# Patient Record
Sex: MALE | Race: WHITE | NOT HISPANIC OR LATINO | Employment: OTHER | URBAN - METROPOLITAN AREA
[De-identification: names, ages, dates, MRNs, and addresses within clinical notes are randomized per-mention and may not be internally consistent; named-entity substitution may affect disease eponyms.]

---

## 2017-05-19 ENCOUNTER — ALLSCRIPTS OFFICE VISIT (OUTPATIENT)
Dept: OTHER | Facility: OTHER | Age: 67
End: 2017-05-19

## 2018-01-14 VITALS
WEIGHT: 279.8 LBS | SYSTOLIC BLOOD PRESSURE: 128 MMHG | RESPIRATION RATE: 16 BRPM | HEIGHT: 74 IN | DIASTOLIC BLOOD PRESSURE: 90 MMHG | TEMPERATURE: 97.9 F | BODY MASS INDEX: 35.91 KG/M2 | HEART RATE: 80 BPM

## 2018-01-16 NOTE — RESULT NOTES
Verified Results  (Q) TSH, 3RD GENERATION 10Oct2016 09:12AM Amadeo Gomes     Test Name Result Flag Reference   TSH 3 87 mIU/L  0 40-4 50

## 2018-08-21 PROBLEM — G89.29 CHRONIC LEFT SHOULDER PAIN: Status: ACTIVE | Noted: 2017-05-19

## 2018-08-21 PROBLEM — M25.512 CHRONIC LEFT SHOULDER PAIN: Status: ACTIVE | Noted: 2017-05-19

## 2018-08-21 RX ORDER — ALPRAZOLAM 2 MG/1
1 TABLET ORAL 3 TIMES DAILY PRN
COMMUNITY
Start: 2017-03-26 | End: 2018-08-22 | Stop reason: SDUPTHER

## 2018-08-21 RX ORDER — CITALOPRAM 20 MG/1
1 TABLET ORAL DAILY
COMMUNITY
Start: 2017-04-03 | End: 2018-08-22 | Stop reason: SDUPTHER

## 2018-08-21 RX ORDER — LEVOTHYROXINE SODIUM 0.15 MG/1
1 TABLET ORAL DAILY
COMMUNITY
Start: 2014-08-22 | End: 2018-08-22 | Stop reason: SDUPTHER

## 2018-08-22 ENCOUNTER — OFFICE VISIT (OUTPATIENT)
Dept: FAMILY MEDICINE CLINIC | Facility: CLINIC | Age: 68
End: 2018-08-22
Payer: MEDICARE

## 2018-08-22 VITALS
HEIGHT: 74 IN | HEART RATE: 76 BPM | DIASTOLIC BLOOD PRESSURE: 98 MMHG | RESPIRATION RATE: 18 BRPM | WEIGHT: 283 LBS | SYSTOLIC BLOOD PRESSURE: 138 MMHG | BODY MASS INDEX: 36.32 KG/M2

## 2018-08-22 DIAGNOSIS — Z11.59 ENCOUNTER FOR HEPATITIS C SCREENING TEST FOR LOW RISK PATIENT: ICD-10-CM

## 2018-08-22 DIAGNOSIS — E03.9 ACQUIRED HYPOTHYROIDISM: ICD-10-CM

## 2018-08-22 DIAGNOSIS — Z23 NEED FOR VACCINATION WITH 13-POLYVALENT PNEUMOCOCCAL CONJUGATE VACCINE: ICD-10-CM

## 2018-08-22 DIAGNOSIS — Z00.00 MEDICARE ANNUAL WELLNESS VISIT, INITIAL: Primary | ICD-10-CM

## 2018-08-22 DIAGNOSIS — N52.9 ERECTILE DYSFUNCTION, UNSPECIFIED ERECTILE DYSFUNCTION TYPE: ICD-10-CM

## 2018-08-22 DIAGNOSIS — F41.9 ANXIETY: ICD-10-CM

## 2018-08-22 LAB — TSH SERPL DL<=0.05 MIU/L-ACNC: 0.4 UIU/ML (ref 0.36–3.74)

## 2018-08-22 PROCEDURE — 84443 ASSAY THYROID STIM HORMONE: CPT | Performed by: NURSE PRACTITIONER

## 2018-08-22 PROCEDURE — 36415 COLL VENOUS BLD VENIPUNCTURE: CPT | Performed by: NURSE PRACTITIONER

## 2018-08-22 PROCEDURE — 90670 PCV13 VACCINE IM: CPT | Performed by: NURSE PRACTITIONER

## 2018-08-22 PROCEDURE — G0438 PPPS, INITIAL VISIT: HCPCS | Performed by: NURSE PRACTITIONER

## 2018-08-22 PROCEDURE — 99214 OFFICE O/P EST MOD 30 MIN: CPT | Performed by: NURSE PRACTITIONER

## 2018-08-22 RX ORDER — SILDENAFIL 50 MG/1
50 TABLET, FILM COATED ORAL DAILY PRN
Qty: 10 TABLET | Refills: 0 | Status: SHIPPED | OUTPATIENT
Start: 2018-08-22 | End: 2019-12-09

## 2018-08-22 RX ORDER — CITALOPRAM 20 MG/1
20 TABLET ORAL DAILY
Qty: 90 TABLET | Refills: 1 | Status: SHIPPED | OUTPATIENT
Start: 2018-08-22 | End: 2018-11-16 | Stop reason: SDUPTHER

## 2018-08-22 RX ORDER — ALPRAZOLAM 2 MG/1
2 TABLET ORAL 3 TIMES DAILY PRN
Qty: 270 TABLET | Refills: 0 | Status: SHIPPED | OUTPATIENT
Start: 2018-08-22 | End: 2018-11-16 | Stop reason: SDUPTHER

## 2018-08-22 RX ORDER — LEVOTHYROXINE SODIUM 0.15 MG/1
150 TABLET ORAL DAILY
Qty: 90 TABLET | Refills: 1 | Status: SHIPPED | OUTPATIENT
Start: 2018-08-22 | End: 2018-11-16 | Stop reason: SDUPTHER

## 2018-08-22 NOTE — PROGRESS NOTES
Chief Complaint   Patient presents with   [facility] Visit     Patient present today for Annual Wellness Visit   Anxiety     needs medication refills       Assessment/Plan:    Acquired hypothyroidism  Patient currently on levothyroxine 150mcg  Stable condition  Needs labs     Anxiety  Patient having some life stress with loss of job and new interviews  Currently on celexa 20mg as well as using alprazolam BID  Diagnoses and all orders for this visit:    Medicare annual wellness visit, initial  -     PNEUMOCOCCAL CONJUGATE VACCINE 13-VALENT GREATER THAN 6 MONTHS    Need for vaccination with 13-polyvalent pneumococcal conjugate vaccine  -     PNEUMOCOCCAL CONJUGATE VACCINE 13-VALENT GREATER THAN 6 MONTHS    Encounter for hepatitis C screening test for low risk patient  -     Hepatitis C antibody; Future    Acquired hypothyroidism  -     TSH, 3rd generation with Free T4 reflex; Future  -     Comprehensive metabolic panel  -     levothyroxine 150 mcg tablet; Take 1 tablet (150 mcg total) by mouth daily  -     TSH, 3rd generation with Free T4 reflex    Anxiety  -     ALPRAZolam (XANAX) 2 MG tablet; Take 1 tablet (2 mg total) by mouth 3 (three) times a day as needed for anxiety for up to 90 days  -     citalopram (CeleXA) 20 mg tablet; Take 1 tablet (20 mg total) by mouth daily    Erectile dysfunction, unspecified erectile dysfunction type  -     sildenafil (VIAGRA) 50 MG tablet; Take 1 tablet (50 mg total) by mouth daily as needed for erectile dysfunction          Subjective:      Patient ID: Myke Deal is a 79 y o  male  Has concerns of ED  Was on a trial of medication       Anxiety   Presents for follow-up visit  Symptoms include excessive worry and nervous/anxious behavior  Patient reports no chest pain, confusion, decreased concentration, depressed mood, dizziness, insomnia, muscle tension, nausea, palpitations, shortness of breath or suicidal ideas  Symptoms occur most days   The severity of symptoms is mild  The quality of sleep is good  Nighttime awakenings: one to two  Compliance with medications is %  Thyroid Problem   Presents for follow-up visit  Symptoms include anxiety  Patient reports no constipation, depressed mood, dry skin, fatigue, leg swelling or palpitations  The symptoms have been stable  The following portions of the patient's history were reviewed and updated as appropriate: allergies, current medications, past family history, past medical history, past social history, past surgical history and problem list     Review of Systems   Constitutional: Negative for fatigue and unexpected weight change  HENT: Negative for trouble swallowing  Eyes: Negative for visual disturbance  Respiratory: Negative for chest tightness and shortness of breath  Cardiovascular: Negative for chest pain, palpitations and leg swelling  Gastrointestinal: Negative for constipation and nausea  Endocrine: Negative for polydipsia, polyphagia and polyuria  Genitourinary: Negative for difficulty urinating  Musculoskeletal: Positive for back pain  Neurological: Negative for dizziness, light-headedness and headaches  Hematological: Does not bruise/bleed easily  Psychiatric/Behavioral: Negative for confusion, decreased concentration and suicidal ideas  The patient is nervous/anxious  The patient does not have insomnia  Objective:      /98 (BP Location: Right arm, Patient Position: Sitting, Cuff Size: Standard)   Pulse 76   Resp 18   Ht 6' 1 75" (1 873 m)   Wt 128 kg (283 lb)   BMI 36 58 kg/m²          Physical Exam   Constitutional: He is oriented to person, place, and time  Vital signs are normal  He appears well-developed and well-nourished  He does not have a sickly appearance  He does not appear ill  HENT:   Head: Normocephalic and atraumatic     Right Ear: Tympanic membrane normal    Left Ear: Tympanic membrane normal    Mouth/Throat: Uvula is midline, oropharynx is clear and moist and mucous membranes are normal    Eyes: Pupils are equal, round, and reactive to light  Neck: No JVD present  Carotid bruit is not present  No thyromegaly present  Cardiovascular: Normal rate, regular rhythm, S1 normal, S2 normal and normal heart sounds  No murmur heard  Pulmonary/Chest: Effort normal and breath sounds normal    Abdominal: Soft  Normal appearance and bowel sounds are normal  He exhibits no distension  Lymphadenopathy:     He has no cervical adenopathy  Neurological: He is alert and oriented to person, place, and time  Skin: Skin is warm, dry and intact  Psychiatric: He has a normal mood and affect   Thought content normal

## 2018-08-22 NOTE — ASSESSMENT & PLAN NOTE
Patient having some life stress with loss of job and new interviews  Currently on celexa 20mg as well as using alprazolam BID

## 2018-08-22 NOTE — PATIENT INSTRUCTIONS
Obesity   AMBULATORY CARE:   Obesity  is when your body mass index (BMI) is greater than 30  Your healthcare provider will use your height and weight to measure your BMI  The risks of obesity include  many health problems, such as injuries or physical disability  You may need tests to check for the following:  · Diabetes     · High blood pressure or high cholesterol     · Heart disease     · Gallbladder or liver disease     · Cancer of the colon, breast, prostate, liver, or kidney     · Sleep apnea     · Arthritis or gout  Seek care immediately if:   · You have a severe headache, confusion, or difficulty speaking  · You have weakness on one side of your body  · You have chest pain, sweating, or shortness of breath  Contact your healthcare provider if:   · You have symptoms of gallbladder or liver disease, such as pain in your upper abdomen  · You have knee or hip pain and discomfort while walking  · You have symptoms of diabetes, such as intense hunger and thirst, and frequent urination  · You have symptoms of sleep apnea, such as snoring or daytime sleepiness  · You have questions or concerns about your condition or care  Treatment for obesity  focuses on helping you lose weight to improve your health  Even a small decrease in BMI can reduce the risk for many health problems  Your healthcare provider will help you set a weight-loss goal   · Lifestyle changes  are the first step in treating obesity  These include making healthy food choices and getting regular physical activity  Your healthcare provider may suggest a weight-loss program that involves coaching, education, and therapy  · Medicine  may help you lose weight when it is used with a healthy diet and physical activity  · Surgery  can help you lose weight if you are very obese and have other health problems  There are several types of weight-loss surgery  Ask your healthcare provider for more information    Be successful losing weight:   · Set small, realistic goals  An example of a small goal is to walk for 20 minutes 5 days a week  Anther goal is to lose 5% of your body weight  · Tell friends, family members, and coworkers about your goals  and ask for their support  Ask a friend to lose weight with you, or join a weight-loss support group  · Identify foods or triggers that may cause you to overeat , and find ways to avoid them  Remove tempting high-calorie foods from your home and workplace  Place a bowl of fresh fruit on your kitchen counter  If stress causes you to eat, then find other ways to cope with stress  · Keep a diary to track what you eat and drink  Also write down how many minutes of physical activity you do each day  Weigh yourself once a week and record it in your diary  Eating changes: You will need to eat 500 to 1,000 fewer calories each day than you currently eat to lose 1 to 2 pounds a week  The following changes will help you cut calories:  · Eat smaller portions  Use small plates, no larger than 9 inches in diameter  Fill your plate half full of fruits and vegetables  Measure your food using measuring cups until you know what a serving size looks like  · Eat 3 meals and 1 or 2 snacks each day  Plan your meals in advance  Swathi Pel and eat at home most of the time  Eat slowly  · Eat fruits and vegetables at every meal   They are low in calories and high in fiber, which makes you feel full  Do not add butter, margarine, or cream sauce to vegetables  Use herbs to season steamed vegetables  · Eat less fat and fewer fried foods  Eat more baked or grilled chicken and fish  These protein sources are lower in calories and fat than red meat  Limit fast food  Dress your salads with olive oil and vinegar instead of bottled dressing  · Limit the amount of sugar you eat  Do not drink sugary beverages  Limit alcohol  Activity changes:  Physical activity is good for your body in many ways   It helps you burn calories and build strong muscles  It decreases stress and depression, and improves your mood  It can also help you sleep better  Talk to your healthcare provider before you begin an exercise program   · Exercise for at least 30 minutes 5 days a week  Start slowly  Set aside time each day for physical activity that you enjoy and that is convenient for you  It is best to do both weight training and an activity that increases your heart rate, such as walking, bicycling, or swimming  · Find ways to be more active  Do yard work and housecleaning  Walk up the stairs instead of using elevators  Spend your leisure time going to events that require walking, such as outdoor festivals or fairs  This extra physical activity can help you lose weight and keep it off  Follow up with your healthcare provider as directed: You may need to meet with a dietitian  Write down your questions so you remember to ask them during your visits  © 2017 [address] [employer] Information is for End User's use only and may not be sold, redistributed or otherwise used for commercial purposes  All illustrations and images included in CareNotes® are the copyrighted property of [employer]  or [address]  The above information is an  only  It is not intended as medical advice for individual conditions or treatments  Talk to your doctor, nurse or pharmacist before following any medical regimen to see if it is safe and effective for you  Urinary Incontinence   WHAT YOU NEED TO KNOW:   What is urinary incontinence? Urinary incontinence (UI) is when you lose control of your bladder  What causes UI? UI occurs because your bladder cannot store or empty urine properly  The following are the most common types of UI:  · Stress incontinence  is when you leak urine due to increased bladder pressure  This may happen when you cough, sneeze, or exercise       · Urge incontinence  is when you feel the need to urinate right away and leak urine accidentally  · Mixed incontinence  is when you have both stress and urge UI  What are the signs and symptoms of UI?   · You feel like your bladder does not empty completely when you urinate  · You urinate often and need to urinate immediately  · You leak urine when you sleep, or you wake up with the urge to urinate  · You leak urine when you cough, sneeze, exercise, or laugh  How is UI diagnosed? Your healthcare provider will ask how often you leak urine and whether you have stress or urge symptoms  Tell him which medicines you take, how often you urinate, and how much liquid you drink each day  You may need any of the following tests:  · Urine tests  may show infection or kidney function  · A pelvic exam  may be done to check for blockages  A pelvic exam will also show if your bladder, uterus, or other organs have moved out of place  · An x-ray, ultrasound, or CT  may show problems with parts of your urinary system  You may be given contrast liquid to help your organs show up better in the pictures  Tell the healthcare provider if you have ever had an allergic reaction to contrast liquid  Do not enter the MRI room with anything metal  Metal can cause serious injury  Tell the healthcare provider if you have any metal in or on your body  · A bladder scan  will show how much urine is left in your bladder after you urinate  You will be asked to urinate and then healthcare providers will use a small ultrasound machine to check the urine left in your bladder  · Cystometry  is used to check the function of your urinary system  Your healthcare provider checks the pressure in your bladder while filling it with fluid  Your bladder pressure may also be tested when your bladder is full and while you urinate  How is UI treated? · Medicines  can help strengthen your bladder control      · Electrical stimulation  is used to send a small amount of electrical energy to your pelvic floor muscles  This helps control your bladder function  Electrodes may be placed outside your body or in your rectum  For women, the electrodes may be placed in the vagina  · A bulking agent  may be injected into the wall of your urethra to make it thicker  This helps keep your urethra closed and decreases urine leakage  · Devices  such as a clamp, pessary, or tampon may help stop urine leaks  Ask your healthcare provider for more information about these and other devices  · Surgery  may be needed if other treatments do not work  Several types of surgery can help improve your bladder control  Ask your healthcare provider for more information about the surgery you may need  How can I manage my symptoms? · Do pelvic muscle exercises often  Your pelvic muscles help you stop urinating  Squeeze these muscles tight for 5 seconds, then relax for 5 seconds  Gradually work up to squeezing for 10 seconds  Do 3 sets of 15 repetitions a day, or as directed  This will help strengthen your pelvic muscles and improve bladder control  · A catheter  may be used to help empty your bladder  A catheter is a tiny, plastic tube that is put into your bladder to drain your urine  Your healthcare provider may tell you to use a catheter to prevent your bladder from getting too full and leaking urine  · Keep a UI record  Write down how often you leak urine and how much you leak  Make a note of what you were doing when you leaked urine  · Train your bladder  Go to the bathroom at set times, such as every 2 hours, even if you do not feel the urge to go  You can also try to hold your urine when you feel the urge to go  For example, hold your urine for 5 minutes when you feel the urge to go  As that becomes easier, hold your urine for 10 minutes  · Drink liquids as directed  Ask your healthcare provider how much liquid to drink each day and which liquids are best for you   You may need to limit the amount of liquid you drink to help control your urine leakage  Limit or do not have drinks that contain caffeine or alcohol  Do not drink any liquid right before you go to bed  · Prevent constipation  Eat a variety of high-fiber foods  Good examples are high-fiber cereals, beans, vegetables, and whole-grain breads  Prune juice may help make your bowel movement softer  Walking is the best way to trigger your intestines to have a bowel movement  · Exercise regularly and maintain a healthy weight  Ask your healthcare provider how much you should weigh and about the best exercise plan for you  Weight loss and exercise will decrease pressure on your bladder and help you control your leakage  Ask him to help you create a weight loss plan if you are overweight  When should I seek immediate care? · You have severe pain  · You are confused or cannot think clearly  When should I contact my healthcare provider? · You have a fever  · You see blood in your urine  · You have pain when you urinate  · You have new or worse pain, even after treatment  · Your mouth feels dry or you have vision changes  · Your urine is cloudy or smells bad  · You have questions or concerns about your condition or care  CARE AGREEMENT:   You have the right to help plan your care  Learn about your health condition and how it may be treated  Discuss treatment options with your caregivers to decide what care you want to receive  You always have the right to refuse treatment  The above information is an  only  It is not intended as medical advice for individual conditions or treatments  Talk to your doctor, nurse or pharmacist before following any medical regimen to see if it is safe and effective for you  © 2017 [address] [employer] Information is for End User's use only and may not be sold, redistributed or otherwise used for commercial purposes   All illustrations and images included in CareNotes® are the copyrighted property of [employer]  or Brigido Oswald  Cigarette Smoking and Your Health   AMBULATORY CARE:   Risks to your health if you smoke:  Nicotine and other chemicals found in tobacco damage every cell in your body  Even if you are a light smoker, you have an increased risk for cancer, heart disease, and lung disease  If you are pregnant or have diabetes, smoking increases your risk for complications  Benefits to your health if you stop smoking:   · You decrease respiratory symptoms such as coughing, wheezing, and shortness of breath  · You reduce your risk for cancers of the lung, mouth, throat, kidney, bladder, pancreas, stomach, and cervix  If you already have cancer, you increase the benefits of chemotherapy  You also reduce your risk for cancer returning or a second cancer from developing  · You reduce your risk for heart disease, blood clots, heart attack, and stroke  · You reduce your risk for lung infections, and diseases such as pneumonia, asthma, chronic bronchitis, and emphysema  · Your circulation improves  More oxygen can be delivered to your body  If you have diabetes, you lower your risk for complications, such as kidney, artery, and eye diseases  You also lower your risk for nerve damage  Nerve damage can lead to amputations, poor vision, and blindness  · You improve your body's ability to heal and to fight infections  Benefits to the health of others if you stop smoking:  Tobacco is harmful to nonsmokers who breathe in your secondhand smoke  The following are ways the health of others around you may improve when you stop smoking:  · You lower the risks for lung cancer and heart disease in nonsmoking adults  · If you are pregnant, you lower the risk for miscarriage, early delivery, low birth weight, and stillbirth  You also lower your baby's risk for SIDS, obesity, developmental delay, and neurobehavioral problems, such as ADHD  · If you have children, you lower their risk for ear infections, colds, pneumonia, bronchitis, and asthma  For more information and support to stop smoking:   · Smokefree  gov  Phone: 9- 989 - 820-5631  Web Address: www smokefree  [employer]  Follow up with your healthcare provider as directed:  Write down your questions so you remember to ask them during your visits  © 2017 [address] [employer] Information is for End User's use only and may not be sold, redistributed or otherwise used for commercial purposes  All illustrations and images included in CareNotes® are the copyrighted property of [employer] M [employer]  or Brigido Oswald  The above information is an  only  It is not intended as medical advice for individual conditions or treatments  Talk to your doctor, nurse or pharmacist before following any medical regimen to see if it is safe and effective for you  Fall Prevention   WHAT YOU NEED TO KNOW:   What is fall prevention? Fall prevention includes ways to make your home and other areas safer  It also includes ways you can move more carefully to prevent a fall  What increases my risk for falls? · Lack of vitamin D    · Not getting enough sleep each night    · Trouble walking or keeping your balance, or foot problems    · Health conditions that cause changes in your blood pressure, vision, or muscle strength and coordination    · Medicines that make you dizzy, weak, or sleepy    · Problems seeing clearly    · Shoes that have high heels or are not supportive    · Tripping hazards, such as items left on the floor, no handrails on the stairs, or broken steps  How can I help protect myself from falls? · Stand or sit up slowly  This may help you keep your balance and prevent falls  If you need to get up during the night, sit up first  Be sure you are fully awake before you stand  Turn on the light before you start walking  Go slowly in case you are still sleepy   Make sure you will not trip over any pets sleeping in the bedroom  · Use assistive devices as directed  Your healthcare provider may suggest that you use a cane or walker to help you keep your balance  You may need to have grab bars put in your bathroom near the toilet or in the shower  · Wear shoes that fit well and have soles that   Wear shoes both inside and outside  Use slippers with good   Do not wear shoes with high heels  · Wear a personal alarm  This is a device that allows you to call 911 if you fall and need help  Ask your healthcare provider for more information  · Stay active  Exercise can help strengthen your muscles and improve your balance  Your healthcare provider may recommend water aerobics or walking  He or she may also recommend physical therapy to improve your coordination  Never start an exercise program without talking to your healthcare provider first      · Manage medical conditions  Keep all appointments with your healthcare providers  Visit your eye doctor as directed  How can I make my home safer? · Add items to prevent falls in the bathroom  Put nonslip strips on your bath or shower floor to prevent you from slipping  Use a bath mat if you do not have carpet in the bathroom  This will prevent you from falling when you step out of the bath or shower  Use a shower seat so you do not need to stand while you shower  Sit on the toilet or a chair in your bathroom to dry yourself and put on clothing  This will prevent you from losing your balance from drying or dressing yourself while you are standing  · Keep paths clear  Remove books, shoes, and other objects from walkways and stairs  Place cords for telephones and lamps out of the way so that you do not need to walk over them  Tape them down if you cannot move them  Remove small rugs  If you cannot remove a rug, secure it with double-sided tape  This will prevent you from tripping  · Install bright lights in your home  Use night lights to help light paths to the bathroom or kitchen  Always turn on the light before you start walking  · Keep items you use often on shelves within reach  Do not use a step stool to help you reach an item  · Paint or place reflective tape on the edges of your stairs  This will help you see the stairs better  Call 911 or have someone else call if:   · You have fallen and are unconscious  · You have fallen and cannot move part of your body  Contact your healthcare provider if:   · You have fallen and have pain or a headache  · You have questions or concerns about your condition or care  CARE AGREEMENT:   You have the right to help plan your care  Learn about your health condition and how it may be treated  Discuss treatment options with your caregivers to decide what care you want to receive  You always have the right to refuse treatment  The above information is an  only  It is not intended as medical advice for individual conditions or treatments  Talk to your doctor, nurse or pharmacist before following any medical regimen to see if it is safe and effective for you  © 2017 [address] [facility] Information is for End User's use only and may not be sold, redistributed or otherwise used for commercial purposes  All illustrations and images included in CareNotes® are the copyrighted property of [employer]  or Brigido Owsald  Advance Directives   WHAT YOU NEED TO KNOW:   What are advance directives? Advance directives are legal documents that state your wishes and plans for medical care  These plans are made ahead of time in case you lose your ability to make decisions for yourself  Advance directives can apply to any medical decision, such as the treatments you want, and if you want to donate organs  What are the types of advance directives? There are many types of advance directives, and each state has rules about how to use them   You may choose a combination of any of the following:  · Living will: This is a written record of the treatment you want  You can also choose which treatments you do not want, which to limit, and which to stop at a certain time  This includes surgery, medicine, IV fluid, and tube feedings  · Durable power of  for healthcare [facility]): This is a written record that states who you want to make healthcare choices for you when you are unable to make them for yourself  This person, called a proxy, is usually a family member or a friend  You may choose more than 1 proxy  · Do not resuscitate (DNR) order:  A DNR order is used in case your heart stops beating or you stop breathing  It is a request not to have certain forms of treatment, such as CPR  A DNR order may be included in other types of advance directives  · Medical directive: This covers the care that you want if you are in a coma, near death, or unable to make decisions for yourself  You can list the treatments you want for each condition  Treatment may include pain medicine, surgery, blood transfusions, dialysis, IV or tube feedings, and a ventilator (breathing machine)  · Values history: This document has questions about your views, beliefs, and how you feel and think about life  This information can help others choose the care that you would choose  Why are advance directives important? An advance directive helps you control your care  Although spoken wishes may be used, it is better to have your wishes written down  Spoken wishes can be misunderstood, or not followed  Treatments may be given even if you do not want them  An advance directive may make it easier for your family to make difficult choices about your care  How do I decide what to put in my advance directives? · Make informed decisions:  Make sure you fully understand treatments or care you may receive   Think about the benefits and problems your decisions could cause for you or your family  Talk to healthcare providers if you have concerns or questions before you write down your wishes  You may also want to talk with your Hindu or , or a   Check your state laws to make sure that what you put in your advance directive is legal      · Sign all forms:  Sign and date your advance directive when you have finished  You may also need 2 witnesses to sign the forms  Witnesses cannot be your doctor or his staff, your spouse, heirs or beneficiaries, people you owe money to, or your chosen proxy  Talk to your family, proxy, and healthcare providers about your advance directive  Give each person a copy, and keep one for yourself in a place you can get to easily  Do not keep it hidden or locked away  · Review and revise your plans: You can revise your advance directive at any time, as long as you are able to make decisions  Review your plan every year, and when there are changes in your life, or your health  When you make changes, let your family, proxy, and healthcare providers know  Give each a new copy  Where can I find more information? · [employer]  [address] , [address]  Phone: 5- 062 - [phone]  Phone: 1- 364 - 938-9850  Web Address: http://www  aafp org  · [address] [facility])  [address] [facility], [address]  Phone: 6- [phone] - [phone]  Phone: [phone]  Web Address: Carlos simon  [facility] AGREEMENT:   You have the right to help plan your care  To help with this plan, you must learn about your health condition and treatment options  You must also learn about advance directives and how they are used  Work with your healthcare providers to decide what care will be used to treat you  You always have the right to refuse treatment  The above information is an  only   It is not intended as medical advice for individual conditions or treatments  Talk to your doctor, nurse or pharmacist before following any medical regimen to see if it is safe and effective for you  © 2017 [address] [employer] Information is for End User's use only and may not be sold, redistributed or otherwise used for commercial purposes  All illustrations and images included in CareNotes® are the copyrighted property of A D A M [employer]  or Brigido Oswald

## 2018-08-22 NOTE — PROGRESS NOTES
Emily Laws is here for his Initial Wellness visit  Health Risk Assessment:  Patient rates overall health as very good  Patient feels that their physical health rating is Slightly worse  Eyesight was rated as Slightly worse  Hearing was rated as Slightly worse  Patient feels that their emotional and mental health rating is Slightly worse  Pain experienced by patient in the last 7 days has been Some  Patient's pain rating has been 3/10  Emotional/Mental Health:  Patient has been feeling nervous/anxious  PHQ-9 Depression Screening:    Frequency of the following problems over the past two weeks:      1  Little interest or pleasure in doing things: 0 - not at all      2  Feeling down, depressed, or hopeless: 0 - not at all  PHQ-2 Score: 0          Broken Bones/Falls: Fall Risk Assessment:    In the past year, patient has experienced: No history of falling in past year          Bladder/Bowel:  Patient has not leaked urine accidently in the last six months  Patient reports no loss of bowel control  Immunizations:  Patient has had a flu vaccination within the last year  Patient has not received a pneumonia shot  Patient has not received a shingles shot  Patient has received tetanus/diphtheria shot  Date of tetanus/diphtheria shot: 8/22/2008    Home Safety:  Patient does not have trouble with stairs inside or outside of their home  Patient currently reports that there are no safety hazards present in home, working smoke alarms, working carbon monoxide detectors  Preventative Screenings:   prostate cancer screen performed, 6/1/2018  colon cancer screen completed, 6/1/2018  cholesterol screen completed, 6/1/2018  glaucoma eye exam completed, 12/1/2016      Nutrition:  Current diet: Regular with servings of the following:    Medications:  Patient is not currently taking any over-the-counter supplements  Patient is able to manage medications  Lifestyle Choices:  Patient reports no tobacco use    Patient has smoked or used tobacco in the past   Patient has stopped his tobacco use  Tobacco use quit date: 02/2002  Patient reports no alcohol use  Patient drives a vehicle  Patient wears seat belt  Current level of exercise of physical activity described by patient as: Moderate  Activities of Daily Living:  Can get out of bed by his or her self, able to dress self, able to make own meals, able to do own shopping, able to bathe self, can do own laundry/housekeeping, can manage own money, pay bills and track expenses    Previous Hospitalizations:  No hospitalization or ED visit in past 12 months        Advanced Directives:  Patient has decided on a power of   Patient has spoken to designated power of   Patient has completed advanced directive      Social Support: no    Preventative Screening/Counseling:      Cardiovascular:      General: Risks and Benefits Discussed and Screening Current      Counseling: Healthy Diet and Healthy Weight      Comments: patient states he has a screening done 6/1/2018        Diabetes:      General: Risks and Benefits Discussed and Screening Current      Counseling: Healthy Diet, Healthy Weight and Improve Physical Activity          Colorectal Cancer:      General: Risks and Benefits Discussed and Screening Current      Counseling: high fiber diet      Comments: Patient states he has Fit kit completed        Prostate Cancer:      General: Risks and Benefits Discussed and Screening Current      Comments: Had screening done 6/1/2018        Osteoporosis:      General: Screening Not Indicated          AAA:      General: Risks and Benefits Discussed and Patient Declines      Comments: Not ready wants to think about        Glaucoma:      General: Screening Current and Risks and Benefits Discussed      Comments: Last eye exam December 2016 at [facility] [address]        HIV:      General: Screening Not Indicated          Hepatitis C:      General: Risks and Benefits Discussed      Counseling: has received general HCV counseling      Additional Comments: Patient would like to have this completed  Immunizations:      Influenza: Risks & Benefits Discussed and Influenza Recommended Annually      Pneumococcal: Risks & Benefits Discussed and Pneumococcal Due Today      Shingrix: Risks & Benefits Discussed and Patient Declines      TDAP: Risks & Benefits Discussed      Other Preventative Counseling (Non-Medicare): Increase physical activity      patient instructed to bring in results of screenings completed outside the network

## 2018-09-24 ENCOUNTER — TELEPHONE (OUTPATIENT)
Dept: FAMILY MEDICINE CLINIC | Facility: CLINIC | Age: 68
End: 2018-09-24

## 2018-11-16 DIAGNOSIS — F41.9 ANXIETY: ICD-10-CM

## 2018-11-16 DIAGNOSIS — E03.9 ACQUIRED HYPOTHYROIDISM: ICD-10-CM

## 2018-11-16 NOTE — TELEPHONE ENCOUNTER
Patient needs refills on his Alprazolam 2mg, Celexa 20 mg and Levothyroxine 150mcg to be sent to his [address]    Last ov was 8/22/2018 for his AWV

## 2018-11-18 RX ORDER — ALPRAZOLAM 2 MG/1
2 TABLET ORAL 3 TIMES DAILY PRN
Qty: 270 TABLET | Refills: 0 | Status: SHIPPED | OUTPATIENT
Start: 2018-11-18 | End: 2019-04-19 | Stop reason: SDUPTHER

## 2018-11-18 RX ORDER — CITALOPRAM 20 MG/1
20 TABLET ORAL DAILY
Qty: 90 TABLET | Refills: 0 | Status: SHIPPED | OUTPATIENT
Start: 2018-11-18 | End: 2019-04-19 | Stop reason: SDUPTHER

## 2018-11-18 RX ORDER — LEVOTHYROXINE SODIUM 0.15 MG/1
150 TABLET ORAL DAILY
Qty: 90 TABLET | Refills: 0 | Status: SHIPPED | OUTPATIENT
Start: 2018-11-18 | End: 2019-04-19 | Stop reason: SDUPTHER

## 2019-04-19 DIAGNOSIS — F41.9 ANXIETY: ICD-10-CM

## 2019-04-19 DIAGNOSIS — E03.9 ACQUIRED HYPOTHYROIDISM: ICD-10-CM

## 2019-04-19 RX ORDER — CITALOPRAM 20 MG/1
30 TABLET ORAL DAILY
Qty: 45 TABLET | Refills: 0 | Status: SHIPPED | OUTPATIENT
Start: 2019-04-19 | End: 2019-04-30 | Stop reason: SDUPTHER

## 2019-04-19 RX ORDER — ALPRAZOLAM 2 MG/1
2 TABLET ORAL 3 TIMES DAILY PRN
Qty: 90 TABLET | Refills: 0 | Status: SHIPPED | OUTPATIENT
Start: 2019-04-19 | End: 2019-04-30 | Stop reason: SDUPTHER

## 2019-04-19 RX ORDER — LEVOTHYROXINE SODIUM 0.15 MG/1
150 TABLET ORAL DAILY
Qty: 30 TABLET | Refills: 0 | Status: SHIPPED | OUTPATIENT
Start: 2019-04-19 | End: 2019-04-30 | Stop reason: SDUPTHER

## 2019-04-30 ENCOUNTER — OFFICE VISIT (OUTPATIENT)
Dept: FAMILY MEDICINE CLINIC | Facility: CLINIC | Age: 69
End: 2019-04-30
Payer: MEDICARE

## 2019-04-30 VITALS
HEART RATE: 60 BPM | RESPIRATION RATE: 18 BRPM | TEMPERATURE: 98.6 F | HEIGHT: 74 IN | SYSTOLIC BLOOD PRESSURE: 158 MMHG | BODY MASS INDEX: 36.06 KG/M2 | DIASTOLIC BLOOD PRESSURE: 90 MMHG | WEIGHT: 281 LBS

## 2019-04-30 DIAGNOSIS — F41.9 ANXIETY: Primary | ICD-10-CM

## 2019-04-30 DIAGNOSIS — F13.90 SEDATIVE, HYPNOTIC, OR ANXIOLYTIC USE, UNSPECIFIED, UNCOMPLICATED: ICD-10-CM

## 2019-04-30 DIAGNOSIS — E03.9 ACQUIRED HYPOTHYROIDISM: ICD-10-CM

## 2019-04-30 DIAGNOSIS — Z12.5 SCREENING FOR PROSTATE CANCER: ICD-10-CM

## 2019-04-30 PROCEDURE — 80346 BENZODIAZEPINES1-12: CPT | Performed by: NURSE PRACTITIONER

## 2019-04-30 PROCEDURE — 99214 OFFICE O/P EST MOD 30 MIN: CPT | Performed by: NURSE PRACTITIONER

## 2019-04-30 RX ORDER — CITALOPRAM 20 MG/1
30 TABLET ORAL DAILY
Qty: 135 TABLET | Refills: 1 | Status: SHIPPED | OUTPATIENT
Start: 2019-04-30 | End: 2019-05-17 | Stop reason: SDUPTHER

## 2019-04-30 RX ORDER — ALPRAZOLAM 2 MG/1
2 TABLET ORAL 3 TIMES DAILY PRN
Qty: 270 TABLET | Refills: 0 | Status: SHIPPED | OUTPATIENT
Start: 2019-05-16 | End: 2019-09-03 | Stop reason: SDUPTHER

## 2019-04-30 RX ORDER — LEVOTHYROXINE SODIUM 0.15 MG/1
150 TABLET ORAL DAILY
Qty: 90 TABLET | Refills: 1 | Status: SHIPPED | OUTPATIENT
Start: 2019-04-30 | End: 2019-09-03 | Stop reason: SDUPTHER

## 2019-05-06 LAB — MISCELLANEOUS LAB TEST RESULT: NORMAL

## 2019-05-15 ENCOUNTER — TELEPHONE (OUTPATIENT)
Dept: FAMILY MEDICINE CLINIC | Facility: CLINIC | Age: 69
End: 2019-05-15

## 2019-05-17 DIAGNOSIS — F41.9 ANXIETY: ICD-10-CM

## 2019-05-17 RX ORDER — CITALOPRAM 20 MG/1
20 TABLET ORAL DAILY
Qty: 90 TABLET | Refills: 1 | Status: SHIPPED | OUTPATIENT
Start: 2019-05-17 | End: 2019-09-03 | Stop reason: SDUPTHER

## 2019-06-25 ENCOUNTER — TELEPHONE (OUTPATIENT)
Dept: FAMILY MEDICINE CLINIC | Facility: CLINIC | Age: 69
End: 2019-06-25

## 2019-09-03 ENCOUNTER — OFFICE VISIT (OUTPATIENT)
Dept: FAMILY MEDICINE CLINIC | Facility: CLINIC | Age: 69
End: 2019-09-03
Payer: MEDICARE

## 2019-09-03 VITALS
SYSTOLIC BLOOD PRESSURE: 118 MMHG | BODY MASS INDEX: 37.16 KG/M2 | TEMPERATURE: 98.5 F | OXYGEN SATURATION: 94 % | HEIGHT: 73 IN | DIASTOLIC BLOOD PRESSURE: 82 MMHG | WEIGHT: 280.4 LBS | RESPIRATION RATE: 18 BRPM | HEART RATE: 84 BPM

## 2019-09-03 DIAGNOSIS — Z12.11 SCREENING FOR COLON CANCER: ICD-10-CM

## 2019-09-03 DIAGNOSIS — Z23 ENCOUNTER FOR IMMUNIZATION: ICD-10-CM

## 2019-09-03 DIAGNOSIS — Z00.00 MEDICARE ANNUAL WELLNESS VISIT, SUBSEQUENT: Primary | ICD-10-CM

## 2019-09-03 DIAGNOSIS — E03.9 ACQUIRED HYPOTHYROIDISM: ICD-10-CM

## 2019-09-03 DIAGNOSIS — E66.01 CLASS 2 SEVERE OBESITY DUE TO EXCESS CALORIES WITH SERIOUS COMORBIDITY AND BODY MASS INDEX (BMI) OF 36.0 TO 36.9 IN ADULT (HCC): ICD-10-CM

## 2019-09-03 DIAGNOSIS — F41.9 ANXIETY: ICD-10-CM

## 2019-09-03 DIAGNOSIS — N52.9 ERECTILE DYSFUNCTION, UNSPECIFIED ERECTILE DYSFUNCTION TYPE: ICD-10-CM

## 2019-09-03 PROCEDURE — 90732 PPSV23 VACC 2 YRS+ SUBQ/IM: CPT | Performed by: NURSE PRACTITIONER

## 2019-09-03 PROCEDURE — 99214 OFFICE O/P EST MOD 30 MIN: CPT | Performed by: NURSE PRACTITIONER

## 2019-09-03 PROCEDURE — G0439 PPPS, SUBSEQ VISIT: HCPCS | Performed by: NURSE PRACTITIONER

## 2019-09-03 PROCEDURE — G0009 ADMIN PNEUMOCOCCAL VACCINE: HCPCS | Performed by: NURSE PRACTITIONER

## 2019-09-03 RX ORDER — CITALOPRAM 20 MG/1
20 TABLET ORAL DAILY
Qty: 90 TABLET | Refills: 1 | Status: SHIPPED | OUTPATIENT
Start: 2019-09-03 | End: 2019-12-09 | Stop reason: SDUPTHER

## 2019-09-03 RX ORDER — LEVOTHYROXINE SODIUM 0.15 MG/1
150 TABLET ORAL DAILY
Qty: 90 TABLET | Refills: 1 | Status: SHIPPED | OUTPATIENT
Start: 2019-09-03 | End: 2019-12-09 | Stop reason: SDUPTHER

## 2019-09-03 RX ORDER — ALPRAZOLAM 2 MG/1
2 TABLET ORAL 3 TIMES DAILY PRN
Qty: 270 TABLET | Refills: 0 | Status: SHIPPED | OUTPATIENT
Start: 2019-09-03 | End: 2019-12-09 | Stop reason: SDUPTHER

## 2019-09-03 NOTE — PATIENT INSTRUCTIONS
Obesity   AMBULATORY CARE:   Obesity  is when your body mass index (BMI) is greater than 30  Your healthcare provider will use your height and weight to measure your BMI  The risks of obesity include  many health problems, such as injuries or physical disability  You may need tests to check for the following:  · Diabetes     · High blood pressure or high cholesterol     · Heart disease     · Gallbladder or liver disease     · Cancer of the colon, breast, prostate, liver, or kidney     · Sleep apnea     · Arthritis or gout  Seek care immediately if:   · You have a severe headache, confusion, or difficulty speaking  · You have weakness on one side of your body  · You have chest pain, sweating, or shortness of breath  Contact your healthcare provider if:   · You have symptoms of gallbladder or liver disease, such as pain in your upper abdomen  · You have knee or hip pain and discomfort while walking  · You have symptoms of diabetes, such as intense hunger and thirst, and frequent urination  · You have symptoms of sleep apnea, such as snoring or daytime sleepiness  · You have questions or concerns about your condition or care  Treatment for obesity  focuses on helping you lose weight to improve your health  Even a small decrease in BMI can reduce the risk for many health problems  Your healthcare provider will help you set a weight-loss goal   · Lifestyle changes  are the first step in treating obesity  These include making healthy food choices and getting regular physical activity  Your healthcare provider may suggest a weight-loss program that involves coaching, education, and therapy  · Medicine  may help you lose weight when it is used with a healthy diet and physical activity  · Surgery  can help you lose weight if you are very obese and have other health problems  There are several types of weight-loss surgery  Ask your healthcare provider for more information    Be successful losing weight:   · Set small, realistic goals  An example of a small goal is to walk for 20 minutes 5 days a week  Anther goal is to lose 5% of your body weight  · Tell friends, family members, and coworkers about your goals  and ask for their support  Ask a friend to lose weight with you, or join a weight-loss support group  · Identify foods or triggers that may cause you to overeat , and find ways to avoid them  Remove tempting high-calorie foods from your home and workplace  Place a bowl of fresh fruit on your kitchen counter  If stress causes you to eat, then find other ways to cope with stress  · Keep a diary to track what you eat and drink  Also write down how many minutes of physical activity you do each day  Weigh yourself once a week and record it in your diary  Eating changes: You will need to eat 500 to 1,000 fewer calories each day than you currently eat to lose 1 to 2 pounds a week  The following changes will help you cut calories:  · Eat smaller portions  Use small plates, no larger than 9 inches in diameter  Fill your plate half full of fruits and vegetables  Measure your food using measuring cups until you know what a serving size looks like  · Eat 3 meals and 1 or 2 snacks each day  Plan your meals in advance  [employer] and eat at home most of the time  Eat slowly  · Eat fruits and vegetables at every meal   They are low in calories and high in fiber, which makes you feel full  Do not add butter, margarine, or cream sauce to vegetables  Use herbs to season steamed vegetables  · Eat less fat and fewer fried foods  Eat more baked or grilled chicken and fish  These protein sources are lower in calories and fat than red meat  Limit fast food  Dress your salads with olive oil and vinegar instead of bottled dressing  · Limit the amount of sugar you eat  Do not drink sugary beverages  Limit alcohol  Activity changes:  Physical activity is good for your body in many ways   It helps you burn calories and build strong muscles  It decreases stress and depression, and improves your mood  It can also help you sleep better  Talk to your healthcare provider before you begin an exercise program   · Exercise for at least 30 minutes 5 days a week  Start slowly  Set aside time each day for physical activity that you enjoy and that is convenient for you  It is best to do both weight training and an activity that increases your heart rate, such as walking, bicycling, or swimming  · Find ways to be more active  Do yard work and housecleaning  Walk up the stairs instead of using elevators  Spend your leisure time going to events that require walking, such as outdoor festivals or fairs  This extra physical activity can help you lose weight and keep it off  Follow up with your healthcare provider as directed: You may need to meet with a dietitian  Write down your questions so you remember to ask them during your visits  © 2017 [address] [employer] Information is for End User's use only and may not be sold, redistributed or otherwise used for commercial purposes  All illustrations and images included in CareNotes® are the copyrighted property of [employer]  or Brigido Oswald  The above information is an  only  It is not intended as medical advice for individual conditions or treatments  Talk to your doctor, nurse or pharmacist before following any medical regimen to see if it is safe and effective for you  Urinary Incontinence   WHAT YOU NEED TO KNOW:   What is urinary incontinence? Urinary incontinence (UI) is when you lose control of your bladder  What causes UI? UI occurs because your bladder cannot store or empty urine properly  The following are the most common types of UI:  · Stress incontinence  is when you leak urine due to increased bladder pressure  This may happen when you cough, sneeze, or exercise       · Urge incontinence  is when you feel the need to urinate right away and leak urine accidentally  · Mixed incontinence  is when you have both stress and urge UI  What are the signs and symptoms of UI?   · You feel like your bladder does not empty completely when you urinate  · You urinate often and need to urinate immediately  · You leak urine when you sleep, or you wake up with the urge to urinate  · You leak urine when you cough, sneeze, exercise, or laugh  How is UI diagnosed? Your healthcare provider will ask how often you leak urine and whether you have stress or urge symptoms  Tell him which medicines you take, how often you urinate, and how much liquid you drink each day  You may need any of the following tests:  · Urine tests  may show infection or kidney function  · A pelvic exam  may be done to check for blockages  A pelvic exam will also show if your bladder, uterus, or other organs have moved out of place  · An x-ray, ultrasound, or CT  may show problems with parts of your urinary system  You may be given contrast liquid to help your organs show up better in the pictures  Tell the healthcare provider if you have ever had an allergic reaction to contrast liquid  Do not enter the MRI room with anything metal  Metal can cause serious injury  Tell the healthcare provider if you have any metal in or on your body  · A bladder scan  will show how much urine is left in your bladder after you urinate  You will be asked to urinate and then healthcare providers will use a small ultrasound machine to check the urine left in your bladder  · Cystometry  is used to check the function of your urinary system  Your healthcare provider checks the pressure in your bladder while filling it with fluid  Your bladder pressure may also be tested when your bladder is full and while you urinate  How is UI treated? · Medicines  can help strengthen your bladder control      · Electrical stimulation  is used to send a small amount of electrical energy to your pelvic floor muscles  This helps control your bladder function  Electrodes may be placed outside your body or in your rectum  For women, the electrodes may be placed in the vagina  · A bulking agent  may be injected into the wall of your urethra to make it thicker  This helps keep your urethra closed and decreases urine leakage  · Devices  such as a clamp, pessary, or tampon may help stop urine leaks  Ask your healthcare provider for more information about these and other devices  · Surgery  may be needed if other treatments do not work  Several types of surgery can help improve your bladder control  Ask your healthcare provider for more information about the surgery you may need  How can I manage my symptoms? · Do pelvic muscle exercises often  Your pelvic muscles help you stop urinating  Squeeze these muscles tight for 5 seconds, then relax for 5 seconds  Gradually work up to squeezing for 10 seconds  Do 3 sets of 15 repetitions a day, or as directed  This will help strengthen your pelvic muscles and improve bladder control  · A catheter  may be used to help empty your bladder  A catheter is a tiny, plastic tube that is put into your bladder to drain your urine  Your healthcare provider may tell you to use a catheter to prevent your bladder from getting too full and leaking urine  · Keep a UI record  Write down how often you leak urine and how much you leak  Make a note of what you were doing when you leaked urine  · Train your bladder  Go to the bathroom at set times, such as every 2 hours, even if you do not feel the urge to go  You can also try to hold your urine when you feel the urge to go  For example, hold your urine for 5 minutes when you feel the urge to go  As that becomes easier, hold your urine for 10 minutes  · Drink liquids as directed  Ask your healthcare provider how much liquid to drink each day and which liquids are best for you   You may need to limit the amount of liquid you drink to help control your urine leakage  Limit or do not have drinks that contain caffeine or alcohol  Do not drink any liquid right before you go to bed  · Prevent constipation  Eat a variety of high-fiber foods  Good examples are high-fiber cereals, beans, vegetables, and whole-grain breads  Prune juice may help make your bowel movement softer  Walking is the best way to trigger your intestines to have a bowel movement  · Exercise regularly and maintain a healthy weight  Ask your healthcare provider how much you should weigh and about the best exercise plan for you  Weight loss and exercise will decrease pressure on your bladder and help you control your leakage  Ask him to help you create a weight loss plan if you are overweight  When should I seek immediate care? · You have severe pain  · You are confused or cannot think clearly  When should I contact my healthcare provider? · You have a fever  · You see blood in your urine  · You have pain when you urinate  · You have new or worse pain, even after treatment  · Your mouth feels dry or you have vision changes  · Your urine is cloudy or smells bad  · You have questions or concerns about your condition or care  CARE AGREEMENT:   You have the right to help plan your care  Learn about your health condition and how it may be treated  Discuss treatment options with your caregivers to decide what care you want to receive  You always have the right to refuse treatment  The above information is an  only  It is not intended as medical advice for individual conditions or treatments  Talk to your doctor, nurse or pharmacist before following any medical regimen to see if it is safe and effective for you  © 2017 [address] [employer] Information is for End User's use only and may not be sold, redistributed or otherwise used for commercial purposes   All illustrations and images included in CareNotes® are the copyrighted property of [employer] A M [employer] Inc  or Brigido Oswald  Cigarette Smoking and Your Health   AMBULATORY CARE:   Risks to your health if you smoke:  Nicotine and other chemicals found in tobacco damage every cell in your body  Even if you are a light smoker, you have an increased risk for cancer, heart disease, and lung disease  If you are pregnant or have diabetes, smoking increases your risk for complications  Benefits to your health if you stop smoking:   · You decrease respiratory symptoms such as coughing, wheezing, and shortness of breath  · You reduce your risk for cancers of the lung, mouth, throat, kidney, bladder, pancreas, stomach, and cervix  If you already have cancer, you increase the benefits of chemotherapy  You also reduce your risk for cancer returning or a second cancer from developing  · You reduce your risk for heart disease, blood clots, heart attack, and stroke  · You reduce your risk for lung infections, and diseases such as pneumonia, asthma, chronic bronchitis, and emphysema  · Your circulation improves  More oxygen can be delivered to your body  If you have diabetes, you lower your risk for complications, such as kidney, artery, and eye diseases  You also lower your risk for nerve damage  Nerve damage can lead to amputations, poor vision, and blindness  · You improve your body's ability to heal and to fight infections  Benefits to the health of others if you stop smoking:  Tobacco is harmful to nonsmokers who breathe in your secondhand smoke  The following are ways the health of others around you may improve when you stop smoking:  · You lower the risks for lung cancer and heart disease in nonsmoking adults  · If you are pregnant, you lower the risk for miscarriage, early delivery, low birth weight, and stillbirth  You also lower your baby's risk for SIDS, obesity, developmental delay, and neurobehavioral problems, such as ADHD  · If you have children, you lower their risk for ear infections, colds, pneumonia, bronchitis, and asthma  For more information and support to stop smoking:   · Smokefree  gov  Phone: 5- 771 - 672-6285  Web Address: www smokefrExelonix  Follow up with your healthcare provider as directed:  Write down your questions so you remember to ask them during your visits  © 2017 [facility] Information is for End User's use only and may not be sold, redistributed or otherwise used for commercial purposes  All illustrations and images included in CareNotes® are the copyrighted property of [employer] M [employer]  or Brigido Oswald  The above information is an  only  It is not intended as medical advice for individual conditions or treatments  Talk to your doctor, nurse or pharmacist before following any medical regimen to see if it is safe and effective for you  Fall Prevention   WHAT YOU NEED TO KNOW:   What is fall prevention? Fall prevention includes ways to make your home and other areas safer  It also includes ways you can move more carefully to prevent a fall  What increases my risk for falls? · Lack of vitamin D    · Not getting enough sleep each night    · Trouble walking or keeping your balance, or foot problems    · Health conditions that cause changes in your blood pressure, vision, or muscle strength and coordination    · Medicines that make you dizzy, weak, or sleepy    · Problems seeing clearly    · Shoes that have high heels or are not supportive    · Tripping hazards, such as items left on the floor, no handrails on the stairs, or broken steps  How can I help protect myself from falls? · Stand or sit up slowly  This may help you keep your balance and prevent falls  If you need to get up during the night, sit up first  Be sure you are fully awake before you stand  Turn on the light before you start walking  Go slowly in case you are still sleepy   Make sure you will not trip over any pets sleeping in the bedroom  · Use assistive devices as directed  Your healthcare provider may suggest that you use a cane or walker to help you keep your balance  You may need to have grab bars put in your bathroom near the toilet or in the shower  · Wear shoes that fit well and have soles that   Wear shoes both inside and outside  Use slippers with good   Do not wear shoes with high heels  · Wear a personal alarm  This is a device that allows you to call 911 if you fall and need help  Ask your healthcare provider for more information  · Stay active  Exercise can help strengthen your muscles and improve your balance  Your healthcare provider may recommend water aerobics or walking  He or she may also recommend physical therapy to improve your coordination  Never start an exercise program without talking to your healthcare provider first      · Manage medical conditions  Keep all appointments with your healthcare providers  Visit your eye doctor as directed  How can I make my home safer? · Add items to prevent falls in the bathroom  Put nonslip strips on your bath or shower floor to prevent you from slipping  Use a bath mat if you do not have carpet in the bathroom  This will prevent you from falling when you step out of the bath or shower  Use a shower seat so you do not need to stand while you shower  Sit on the toilet or a chair in your bathroom to dry yourself and put on clothing  This will prevent you from losing your balance from drying or dressing yourself while you are standing  · Keep paths clear  Remove books, shoes, and other objects from walkways and stairs  Place cords for telephones and lamps out of the way so that you do not need to walk over them  Tape them down if you cannot move them  Remove small rugs  If you cannot remove a rug, secure it with double-sided tape  This will prevent you from tripping  · Install bright lights in your home  Use night lights to help light paths to the bathroom or kitchen  Always turn on the light before you start walking  · Keep items you use often on shelves within reach  Do not use a step stool to help you reach an item  · Paint or place reflective tape on the edges of your stairs  This will help you see the stairs better  Call 911 or have someone else call if:   · You have fallen and are unconscious  · You have fallen and cannot move part of your body  Contact your healthcare provider if:   · You have fallen and have pain or a headache  · You have questions or concerns about your condition or care  CARE AGREEMENT:   You have the right to help plan your care  Learn about your health condition and how it may be treated  Discuss treatment options with your caregivers to decide what care you want to receive  You always have the right to refuse treatment  The above information is an  only  It is not intended as medical advice for individual conditions or treatments  Talk to your doctor, nurse or pharmacist before following any medical regimen to see if it is safe and effective for you  © 2017 [facility] Information is for End User's use only and may not be sold, redistributed or otherwise used for commercial purposes  All illustrations and images included in CareNotes® are the copyrighted property of [employer]  or Brigido Oswald  Advance Directives   WHAT YOU NEED TO KNOW:   What are advance directives? Advance directives are legal documents that state your wishes and plans for medical care  These plans are made ahead of time in case you lose your ability to make decisions for yourself  Advance directives can apply to any medical decision, such as the treatments you want, and if you want to donate organs  What are the types of advance directives? There are many types of advance directives, and each state has rules about how to use them   You may choose a combination of any of the following:  · Living will: This is a written record of the treatment you want  You can also choose which treatments you do not want, which to limit, and which to stop at a certain time  This includes surgery, medicine, IV fluid, and tube feedings  · Durable power of  for healthcare [facility]): This is a written record that states who you want to make healthcare choices for you when you are unable to make them for yourself  This person, called a proxy, is usually a family member or a friend  You may choose more than 1 proxy  · Do not resuscitate (DNR) order:  A DNR order is used in case your heart stops beating or you stop breathing  It is a request not to have certain forms of treatment, such as CPR  A DNR order may be included in other types of advance directives  · Medical directive: This covers the care that you want if you are in a coma, near death, or unable to make decisions for yourself  You can list the treatments you want for each condition  Treatment may include pain medicine, surgery, blood transfusions, dialysis, IV or tube feedings, and a ventilator (breathing machine)  · Values history: This document has questions about your views, beliefs, and how you feel and think about life  This information can help others choose the care that you would choose  Why are advance directives important? An advance directive helps you control your care  Although spoken wishes may be used, it is better to have your wishes written down  Spoken wishes can be misunderstood, or not followed  Treatments may be given even if you do not want them  An advance directive may make it easier for your family to make difficult choices about your care  How do I decide what to put in my advance directives? · Make informed decisions:  Make sure you fully understand treatments or care you may receive   Think about the benefits and problems your decisions could cause for you or your family  Talk to healthcare providers if you have concerns or questions before you write down your wishes  You may also want to talk with your Mosque or , or a   Check your state laws to make sure that what you put in your advance directive is legal      · Sign all forms:  Sign and date your advance directive when you have finished  You may also need 2 witnesses to sign the forms  Witnesses cannot be your doctor or his staff, your spouse, heirs or beneficiaries, people you owe money to, or your chosen proxy  Talk to your family, proxy, and healthcare providers about your advance directive  Give each person a copy, and keep one for yourself in a place you can get to easily  Do not keep it hidden or locked away  · Review and revise your plans: You can revise your advance directive at any time, as long as you are able to make decisions  Review your plan every year, and when there are changes in your life, or your health  When you make changes, let your family, proxy, and healthcare providers know  Give each a new copy  Where can I find more information? · [employer]  [address] , [address]  Phone: 7- 948 - [phone]  Phone: 2- 288 - 860-9916  Web Address: http://www  aafp org  · [address] [facility])  [address] [facility] [place] [address]  Phone: 5- [phone]  Phone: [phone]  Web Address: Carlos simon  [facility] AGREEMENT:   You have the right to help plan your care  To help with this plan, you must learn about your health condition and treatment options  You must also learn about advance directives and how they are used  Work with your healthcare providers to decide what care will be used to treat you  You always have the right to refuse treatment  The above information is an  only   It is not intended as medical advice for individual conditions or treatments  Talk to your doctor, nurse or pharmacist before following any medical regimen to see if it is safe and effective for you  © 2017 [address] [employer] Information is for End User's use only and may not be sold, redistributed or otherwise used for commercial purposes  All illustrations and images included in CareNotes® are the copyrighted property of A [employer] M [employer]  or Brigido Oswald

## 2019-09-03 NOTE — PROGRESS NOTES
Assessment and Plan:    Problem List Items Addressed This Visit        Endocrine    Acquired hypothyroidism     Patient TSH is up to date he is Euythroid  Maintained on levothyroxine 150mcg         Relevant Medications    levothyroxine 150 mcg tablet       Genitourinary    Erectile dysfunction    Relevant Orders    Testosterone       Other    Anxiety     Patient remains on xanax at higher dose  We did discuss CBD oil as adjunct to reduce his xanax use  Patient will look into this  He also on celexa  Relevant Medications    ALPRAZolam (XANAX) 2 MG tablet    citalopram (CeleXA) 20 mg tablet      Other Visit Diagnoses     Medicare annual wellness visit, subsequent    -  Primary    Screening for colon cancer        Encounter for immunization        Relevant Orders    PNEUMOCOCCAL POLYSACCHARIDE VACCINE 23-VALENT =>1YO SQ IM (Completed)    Class 2 severe obesity due to excess calories with serious comorbidity and body mass index (BMI) of 36 0 to 36 9 in adult [facility])                     Diagnoses and all orders for this visit:    Medicare annual wellness visit, subsequent    Screening for colon cancer    Encounter for immunization  -     PNEUMOCOCCAL POLYSACCHARIDE VACCINE 23-VALENT =>1YO SQ IM    Class 2 severe obesity due to excess calories with serious comorbidity and body mass index (BMI) of 36 0 to 36 9 in adult (HCC)    Anxiety  -     ALPRAZolam (XANAX) 2 MG tablet; Take 1 tablet (2 mg total) by mouth 3 (three) times a day as needed for anxiety  -     citalopram (CeleXA) 20 mg tablet; Take 1 tablet (20 mg total) by mouth daily    Acquired hypothyroidism  -     levothyroxine 150 mcg tablet; Take 1 tablet (150 mcg total) by mouth daily    Erectile dysfunction, unspecified erectile dysfunction type  -     Testosterone; Future    Other orders  -     Cancel: Ambulatory referral to Gastroenterology; Future              Subjective:      Patient ID: Carolina Ortega is a 81 y o  male      CC:    Chief Complaint Patient presents with   [facility] Wellness Visit       HPI:    Thyroid Problem   Presents for follow-up visit  Symptoms include diaphoresis  Patient reports no anxiety, constipation or palpitations  The symptoms have been stable  Anxiety   Presents for follow-up visit  Patient reports no chest pain, dizziness, nervous/anxious behavior, palpitations, shortness of breath or suicidal ideas  Symptoms occur occasionally  The following portions of the patient's history were reviewed and updated as appropriate: allergies, current medications, past family history, past medical history, past social history, past surgical history and problem list       Review of Systems   Constitutional: Positive for appetite change and diaphoresis  Eyes: Negative for visual disturbance  Respiratory: Negative for chest tightness and shortness of breath  Cardiovascular: Negative for chest pain, palpitations and leg swelling  Gastrointestinal: Negative for constipation and vomiting  Musculoskeletal: Positive for back pain  Neurological: Negative for dizziness, light-headedness and headaches  Psychiatric/Behavioral: Negative for sleep disturbance and suicidal ideas  The patient is not nervous/anxious  Data to review:       Objective:    Vitals:    09/03/19 1613   BP: 118/82   BP Location: Left arm   Patient Position: Sitting   Cuff Size: Adult   Pulse: 84   Resp: 18   Temp: 98 5 °F (36 9 °C)   TempSrc: Temporal   SpO2: 94%   Weight: 127 kg (280 lb 6 4 oz)   Height: 6' 1" (1 854 m)        Physical Exam   Constitutional: He is oriented to person, place, and time  Vital signs are normal  He appears well-developed and well-nourished  He does not appear ill  HENT:   Head: Normocephalic and atraumatic  Eyes: Pupils are equal    Neck: No thyromegaly present  Cardiovascular: Normal rate, regular rhythm, S1 normal and S2 normal    No murmur heard    No lower extremity edema    Pulmonary/Chest: Effort normal and breath sounds normal  No respiratory distress  Neurological: He is alert and oriented to person, place, and time  Psychiatric: He has a normal mood and affect   Thought content normal

## 2019-09-03 NOTE — PROGRESS NOTES
Assessment and Plan:     Problem List Items Addressed This Visit        Endocrine    Acquired hypothyroidism     Patient TSH is up to date he is Euythroid  Maintained on levothyroxine 150mcg         Relevant Medications    levothyroxine 150 mcg tablet       Genitourinary    Erectile dysfunction    Relevant Orders    Testosterone       Other    Anxiety     Patient remains on xanax at higher dose  We did discuss CBD oil as adjunct to reduce his xanax use  Patient will look into this  He also on celexa            Relevant Medications    ALPRAZolam (XANAX) 2 MG tablet    citalopram (CeleXA) 20 mg tablet      Other Visit Diagnoses     Medicare annual wellness visit, subsequent    -  Primary    Screening for colon cancer        Encounter for immunization        Relevant Orders    PNEUMOCOCCAL POLYSACCHARIDE VACCINE 23-VALENT =>1YO SQ IM (Completed)    Class 2 severe obesity due to excess calories with serious comorbidity and body mass index (BMI) of 36 0 to 36 9 in adult [facility])             History of Present Illness:     Patient presents for Medicare Annual Wellness visit    Patient Care Team:  Kristine Garcia as PCP - General (Family Medicine)     Problem List:     Patient Active Problem List   Diagnosis    Acquired hypothyroidism    Acute bilateral low back pain with bilateral sciatica    Anxiety    Chronic left shoulder pain    Depression    Erectile dysfunction      Past Medical and Surgical History:     Past Medical History:   Diagnosis Date    Depression 9/16/2016    Thyroid disease      Past Surgical History:   Procedure Laterality Date    KNEE SURGERY      TONSILLECTOMY        Family History:     Family History   Problem Relation Age of Onset    Hypothyroidism Mother     Mental illness Mother         disorder    Stroke Mother     Hypothyroidism Sister    Dionwyn Serge Cancer Sister       Social History:     Social History     Tobacco Use   Smoking Status Never Smoker   Smokeless Tobacco Never Used Social History     Substance and Sexual Activity   Alcohol Use Yes    Comment: occasional      Social History     Substance and Sexual Activity   Drug Use No      Medications and Allergies:     Current Outpatient Medications   Medication Sig Dispense Refill    ALPRAZolam (XANAX) 2 MG tablet Take 1 tablet (2 mg total) by mouth 3 (three) times a day as needed for anxiety 270 tablet 0    citalopram (CeleXA) 20 mg tablet Take 1 tablet (20 mg total) by mouth daily 90 tablet 1    levothyroxine 150 mcg tablet Take 1 tablet (150 mcg total) by mouth daily 90 tablet 1    sildenafil (VIAGRA) 50 MG tablet Take 1 tablet (50 mg total) by mouth daily as needed for erectile dysfunction (Patient not taking: Reported on 4/30/2019) 10 tablet 0     No current facility-administered medications for this visit  Allergies   Allergen Reactions    Other      seasonal      Immunizations:     Immunization History   Administered Date(s) Administered    Influenza TIV (IM) 09/30/2014, 10/08/2015    Pneumococcal Conjugate 13-Valent 08/22/2018    Pneumococcal Polysaccharide PPV23 09/03/2019      Medicare Screening Tests and Risk Assessments:     Shala Cortez is here for his Subsequent Wellness visit  Health Risk Assessment:  Patient rates overall health as good  Patient feels that their physical health rating is Same  Eyesight was rated as Same  Patient feels that their emotional and mental health rating is Much better  Pain experienced by patient in the last 7 days has been Some  Patient's pain rating has been 7/10  Patient states that he has experienced no weight loss or gain in last 6 months  Emotional/Mental Health:  Patient has been feeling nervous/anxious  PHQ-9 Depression Screening:    Frequency of the following problems over the past two weeks:      1  Little interest or pleasure in doing things: 0 - not at all      2   Feeling down, depressed, or hopeless: 0 - not at all  PHQ-2 Score: 0          Broken Bones/Falls: Fall Risk Assessment:    In the past year, patient has experienced: No history of falling in past year          Bladder/Bowel:  Patient has not leaked urine accidently in the last six months  Patient reports no loss of bowel control  Immunizations:  Patient has not had a flu vaccination within the last year  Patient has not received a pneumonia shot  Patient has not received a shingles shot  Patient has received tetanus/diphtheria shot  Home Safety:  Patient does not have trouble with stairs inside or outside of their home  Patient currently reports that there are no safety hazards present in home, working smoke alarms, working carbon monoxide detectors  Preventative Screenings:   prostate cancer screen performed, 8/15/2018  colon cancer screen completed, no cholesterol screen completed, glaucoma eye exam completed,     Nutrition:  Current diet: Regular and Limited junk food with servings of the following:    Medications:  Patient is currently taking over-the-counter supplements  Patient is able to manage medications  Lifestyle Choices:  Patient reports no tobacco use  Patient has smoked or used tobacco in the past   Patient has stopped his tobacco use  Patient reports no alcohol use  Patient drives a vehicle  Patient wears seat belt  Current level of exercise of physical activity described by patient as: active  Activities of Daily Living:  Can get out of bed by his or her self, able to dress self, able to make own meals, able to do own shopping, able to bathe self, can do own laundry/housekeeping, can manage own money, pay bills and track expenses    Previous Hospitalizations:  No hospitalization or ED visit in past 12 months        Advanced Directives:  Patient has decided on a power of   Patient has spoken to designated power of   Patient has not completed advanced directive          Preventative Screening/Counseling:      Cardiovascular:      General: Risks and Benefits Discussed          Diabetes:      General: Risks and Benefits Discussed and Screening Current          Colorectal Cancer:      General: Risks and Benefits Discussed and Screening Current      Comments: Patient states he has kit completed in [place]  Prostate Cancer:      General: Risks and Benefits Discussed and Screening Current          Osteoporosis:      General: Screening Not Indicated          AAA:      General: Screening Not Indicated          Glaucoma:      General: Risks and Benefits Discussed      Referrals: Optometry      Comments: Has been 3 years         HIV:      General: Screening Not Indicated          Hepatitis C:      General: Risks and Benefits Discussed and Patient Declines        Advanced Directives:   Patient has living will for healthcare, has durable POA for healthcare, patient has an advanced directive  Immunizations:      Influenza: Risks & Benefits Discussed and Influenza Recommended Annually      Pneumococcal: Risks & Benefits Discussed and Pneumococcal Due Today        BMI Counseling: Body mass index is 36 99 kg/m²  Discussed the patient's BMI with him  The BMI is above average  BMI counseling and education was provided to the patient  Nutrition recommendations include reducing portion sizes, reducing intake of saturated fat and trans fat and reducing intake of cholesterol

## 2019-09-03 NOTE — ASSESSMENT & PLAN NOTE
Patient remains on xanax at higher dose  We did discuss CBD oil as adjunct to reduce his xanax use  Patient will look into this  He also on celexa

## 2019-12-09 ENCOUNTER — OFFICE VISIT (OUTPATIENT)
Dept: FAMILY MEDICINE CLINIC | Facility: CLINIC | Age: 69
End: 2019-12-09
Payer: MEDICARE

## 2019-12-09 VITALS
HEART RATE: 72 BPM | DIASTOLIC BLOOD PRESSURE: 86 MMHG | WEIGHT: 288 LBS | BODY MASS INDEX: 38.17 KG/M2 | HEIGHT: 73 IN | SYSTOLIC BLOOD PRESSURE: 130 MMHG

## 2019-12-09 DIAGNOSIS — R06.02 SOB (SHORTNESS OF BREATH) ON EXERTION: ICD-10-CM

## 2019-12-09 DIAGNOSIS — E03.9 ACQUIRED HYPOTHYROIDISM: ICD-10-CM

## 2019-12-09 DIAGNOSIS — F41.9 ANXIETY: ICD-10-CM

## 2019-12-09 DIAGNOSIS — R07.9 EXERTIONAL CHEST PAIN: Primary | ICD-10-CM

## 2019-12-09 DIAGNOSIS — R25.2 LEG CRAMP: ICD-10-CM

## 2019-12-09 DIAGNOSIS — Z12.11 SCREEN FOR COLON CANCER: ICD-10-CM

## 2019-12-09 PROCEDURE — 93000 ELECTROCARDIOGRAM COMPLETE: CPT | Performed by: NURSE PRACTITIONER

## 2019-12-09 PROCEDURE — 99214 OFFICE O/P EST MOD 30 MIN: CPT | Performed by: NURSE PRACTITIONER

## 2019-12-09 RX ORDER — ALPRAZOLAM 2 MG/1
2 TABLET ORAL 3 TIMES DAILY PRN
Qty: 270 TABLET | Refills: 0 | Status: SHIPPED | OUTPATIENT
Start: 2019-12-09 | End: 2020-04-01 | Stop reason: SDUPTHER

## 2019-12-09 RX ORDER — LEVOTHYROXINE SODIUM 0.15 MG/1
150 TABLET ORAL DAILY
Qty: 90 TABLET | Refills: 1 | Status: SHIPPED | OUTPATIENT
Start: 2019-12-09 | End: 2020-04-01 | Stop reason: SDUPTHER

## 2019-12-09 RX ORDER — CITALOPRAM 20 MG/1
20 TABLET ORAL DAILY
Qty: 90 TABLET | Refills: 1 | Status: SHIPPED | OUTPATIENT
Start: 2019-12-09 | End: 2020-04-01 | Stop reason: SDUPTHER

## 2019-12-09 NOTE — ASSESSMENT & PLAN NOTE
Controlled substance contract verbally reviewed with patient and signed  Patient can safely continue with his current prescriptions despite high dose  He is on celexa 20mg  We have had open discussion about alternative medication and treatment  We will continue to have these discussion

## 2019-12-09 NOTE — PROGRESS NOTES
Assessment and Plan:    Problem List Items Addressed This Visit        Endocrine    Acquired hypothyroidism    Relevant Medications    levothyroxine 150 mcg tablet    Other Relevant Orders    TSH, 3rd generation with Free T4 reflex       Other    Anxiety       Controlled substance contract verbally reviewed with patient and signed  Patient can safely continue with his current prescriptions despite high dose  He is on celexa 20mg  We have had open discussion about alternative medication and treatment  We will continue to have these discussion  Relevant Medications    citalopram (CeleXA) 20 mg tablet    ALPRAZolam (XANAX) 2 MG tablet      Other Visit Diagnoses     Exertional chest pain    -  Primary    Relevant Orders    Lipid panel    Magnesium    CBC and differential    Comprehensive metabolic panel    Stress test only, exercise    Complete PFT with post bronchodilator    POCT ECG (Completed)    SOB (shortness of breath) on exertion        Relevant Orders    Lipid panel    Comprehensive metabolic panel    Stress test only, exercise    Complete PFT with post bronchodilator    POCT ECG (Completed)    Leg cramp        Relevant Orders    Magnesium    CBC and differential    Screen for colon cancer        Relevant Orders    Occult Blood, Fecal Immunochemical                 Diagnoses and all orders for this visit:    Exertional chest pain  -     Lipid panel  -     Magnesium  -     CBC and differential; Future  -     Comprehensive metabolic panel  -     Stress test only, exercise; Future  -     Complete PFT with post bronchodilator; Future  -     POCT ECG    SOB (shortness of breath) on exertion  -     Lipid panel  -     Comprehensive metabolic panel  -     Stress test only, exercise; Future  -     Complete PFT with post bronchodilator; Future  -     POCT ECG    Acquired hypothyroidism  -     levothyroxine 150 mcg tablet;  Take 1 tablet (150 mcg total) by mouth daily  -     TSH, 3rd generation with Free T4 reflex; Future    Anxiety  -     citalopram (CeleXA) 20 mg tablet; Take 1 tablet (20 mg total) by mouth daily  -     ALPRAZolam (XANAX) 2 MG tablet; Take 1 tablet (2 mg total) by mouth 3 (three) times a day as needed for anxiety    Leg cramp  -     Magnesium  -     CBC and differential; Future    Screen for colon cancer  -     Occult Blood, Fecal Immunochemical; Future              Subjective:      Patient ID: Sherida Hammans is a 81 y o  male  CC:    Chief Complaint   Patient presents with    Follow-up     patient is here for a 3 month f/u re: chronic medical conditions  Patient c/o lower back pain flare up   Leg Pain     patient c/o bilateral calf pain x several months   Shortness of Breath     patient c/o SOB on exertion x 1 month  ak       HPI:    Patient complains of bilateral calf pain which can occur at any time of day but mostly notes at rest  He states he drinks a lot of water, he does have iced tea  He states he can typically rub it out or walk it out, it has been going on for several months  Patient states he is taking xanax TID  He states he tried CBD and it did not help  He also maintains on celexa  Shortness of Breath   This is a new problem  The current episode started 1 to 4 weeks ago  The problem occurs intermittently  The problem has been gradually worsening  Associated symptoms include chest pain  Pertinent negatives include no headaches, leg swelling, orthopnea, syncope or wheezing  Chest Pain    This is a new problem  The current episode started 1 to 4 weeks ago  The onset quality is gradual  The problem occurs intermittently  The pain is present in the substernal region  The pain is at a severity of 3/10  The quality of the pain is described as pressure  The pain does not radiate  Associated symptoms include back pain, exertional chest pressure and shortness of breath   Pertinent negatives include no diaphoresis, dizziness (rare ), headaches, irregular heartbeat, lower extremity edema, numbness, orthopnea, palpitations or syncope  The pain is aggravated by exertion  Risk factors include male gender, lack of exercise, obesity, sedentary lifestyle and smoking/tobacco exposure (smoke up til 2001; 3/4 pack per day )  The following portions of the patient's history were reviewed and updated as appropriate: allergies, current medications, past family history, past medical history, past social history, past surgical history and problem list       Review of Systems   Constitutional: Positive for appetite change  Negative for activity change, diaphoresis and unexpected weight change  HENT: Negative for trouble swallowing  Eyes: Negative for visual disturbance  Respiratory: Positive for shortness of breath  Negative for chest tightness and wheezing  Cardiovascular: Positive for chest pain  Negative for palpitations, orthopnea, leg swelling and syncope  Gastrointestinal: Negative  Genitourinary: Negative  Musculoskeletal: Positive for back pain and myalgias (muscle cramping )  Neurological: Negative for dizziness (rare ), numbness and headaches  Psychiatric/Behavioral: Negative for decreased concentration, dysphoric mood and suicidal ideas  The patient is not nervous/anxious  Data to review:       Objective:    Vitals:    12/09/19 1129   BP: 130/86   Pulse: 72   Weight: 131 kg (288 lb)   Height: 6' 1" (1 854 m)        Physical Exam   Constitutional: He is oriented to person, place, and time  Vital signs are normal  He appears well-developed and well-nourished  He does not appear ill  HENT:   Head: Normocephalic and atraumatic  Right Ear: Tympanic membrane normal    Left Ear: Tympanic membrane normal    Nose: Nose normal    Mouth/Throat: Uvula is midline, oropharynx is clear and moist and mucous membranes are normal    Eyes: Pupils are equal    Cardiovascular: Normal rate, regular rhythm, S1 normal and S2 normal    No murmur heard    No lower extremity edema    Pulmonary/Chest: Effort normal and breath sounds normal  No respiratory distress  Abdominal: Soft  Bowel sounds are normal    Obese    Lymphadenopathy:     He has no cervical adenopathy  Neurological: He is alert and oriented to person, place, and time  Psychiatric: He has a normal mood and affect  Thought content normal    Nursing note and vitals reviewed

## 2019-12-26 ENCOUNTER — HOSPITAL ENCOUNTER (OUTPATIENT)
Dept: NON INVASIVE DIAGNOSTICS | Facility: HOSPITAL | Age: 69
Discharge: HOME | DRG: 176 | End: 2019-12-26
Payer: MEDICARE

## 2019-12-26 ENCOUNTER — HOSPITAL ENCOUNTER (OUTPATIENT)
Dept: PULMONOLOGY | Facility: HOSPITAL | Age: 69
Discharge: HOME | DRG: 176 | End: 2019-12-26
Payer: MEDICARE

## 2019-12-26 ENCOUNTER — HOSPITAL ENCOUNTER (INPATIENT)
Facility: HOSPITAL | Age: 69
LOS: 1 days | Discharge: HOME | DRG: 176 | End: 2019-12-28
Attending: EMERGENCY MEDICINE | Admitting: FAMILY MEDICINE
Payer: MEDICARE

## 2019-12-26 ENCOUNTER — APPOINTMENT (EMERGENCY)
Dept: RADIOLOGY | Facility: HOSPITAL | Age: 69
DRG: 176 | End: 2019-12-26
Payer: MEDICARE

## 2019-12-26 DIAGNOSIS — R07.9 EXERTIONAL CHEST PAIN: ICD-10-CM

## 2019-12-26 DIAGNOSIS — I26.99 PULMONARY EMBOLISM (HCC): ICD-10-CM

## 2019-12-26 DIAGNOSIS — R07.9 CHEST PAIN: ICD-10-CM

## 2019-12-26 DIAGNOSIS — R06.00 DYSPNEA: ICD-10-CM

## 2019-12-26 DIAGNOSIS — R06.02 SOB (SHORTNESS OF BREATH) ON EXERTION: ICD-10-CM

## 2019-12-26 DIAGNOSIS — R55 SYNCOPE: Primary | ICD-10-CM

## 2019-12-26 PROBLEM — N17.9 ACUTE KIDNEY INJURY (HCC): Status: ACTIVE | Noted: 2019-12-26

## 2019-12-26 PROBLEM — E66.812 CLASS 2 SEVERE OBESITY DUE TO EXCESS CALORIES WITH SERIOUS COMORBIDITY AND BODY MASS INDEX (BMI) OF 37.0 TO 37.9 IN ADULT (HCC): Status: ACTIVE | Noted: 2019-12-26

## 2019-12-26 PROBLEM — E66.01 CLASS 2 SEVERE OBESITY DUE TO EXCESS CALORIES WITH SERIOUS COMORBIDITY AND BODY MASS INDEX (BMI) OF 37.0 TO 37.9 IN ADULT (HCC): Status: ACTIVE | Noted: 2019-12-26

## 2019-12-26 LAB
ALBUMIN SERPL BCP-MCNC: 4.2 G/DL (ref 3.5–5)
ALP SERPL-CCNC: 71 U/L (ref 46–116)
ALT SERPL W P-5'-P-CCNC: 25 U/L (ref 12–78)
ANION GAP SERPL CALCULATED.3IONS-SCNC: 8 MMOL/L (ref 4–13)
APTT PPP: 25 SECONDS (ref 23–37)
AST SERPL W P-5'-P-CCNC: 21 U/L (ref 5–45)
BACTERIA UR QL AUTO: ABNORMAL /HPF
BASOPHILS # BLD AUTO: 0.05 THOUSANDS/ΜL (ref 0–0.1)
BASOPHILS NFR BLD AUTO: 1 % (ref 0–1)
BILIRUB SERPL-MCNC: 0.56 MG/DL (ref 0.2–1)
BILIRUB UR QL STRIP: NEGATIVE
BUN SERPL-MCNC: 16 MG/DL (ref 5–25)
CALCIUM SERPL-MCNC: 9.4 MG/DL (ref 8.3–10.1)
CHEST PAIN STATEMENT: NORMAL
CHLORIDE SERPL-SCNC: 104 MMOL/L (ref 100–108)
CLARITY UR: CLEAR
CO2 SERPL-SCNC: 27 MMOL/L (ref 21–32)
COLOR UR: YELLOW
CREAT SERPL-MCNC: 1.36 MG/DL (ref 0.6–1.3)
CREAT UR-MCNC: 115.8 MG/DL
D DIMER PPP FEU-MCNC: 3.52 UG/ML FEU
EOSINOPHIL # BLD AUTO: 0.23 THOUSAND/ΜL (ref 0–0.61)
EOSINOPHIL NFR BLD AUTO: 3 % (ref 0–6)
ERYTHROCYTE [DISTWIDTH] IN BLOOD BY AUTOMATED COUNT: 12 % (ref 11.6–15.1)
GFR SERPL CREATININE-BSD FRML MDRD: 53 ML/MIN/1.73SQ M
GLUCOSE SERPL-MCNC: 117 MG/DL (ref 65–140)
GLUCOSE UR STRIP-MCNC: NEGATIVE MG/DL
HCT VFR BLD AUTO: 47.2 % (ref 36.5–49.3)
HGB BLD-MCNC: 15.9 G/DL (ref 12–17)
HGB UR QL STRIP.AUTO: ABNORMAL
IMM GRANULOCYTES # BLD AUTO: 0.02 THOUSAND/UL (ref 0–0.2)
IMM GRANULOCYTES NFR BLD AUTO: 0 % (ref 0–2)
INR PPP: 1 (ref 0.84–1.19)
KETONES UR STRIP-MCNC: NEGATIVE MG/DL
LEUKOCYTE ESTERASE UR QL STRIP: NEGATIVE
LYMPHOCYTES # BLD AUTO: 2.7 THOUSANDS/ΜL (ref 0.6–4.47)
LYMPHOCYTES NFR BLD AUTO: 39 % (ref 14–44)
MAX DIASTOLIC BP: 90 MMHG
MAX HEART RATE: 153 BPM
MAX PREDICTED HEART RATE: 153 BPM
MAX. SYSTOLIC BP: 174 MMHG
MCH RBC QN AUTO: 31.5 PG (ref 26.8–34.3)
MCHC RBC AUTO-ENTMCNC: 33.7 G/DL (ref 31.4–37.4)
MCV RBC AUTO: 94 FL (ref 82–98)
MONOCYTES # BLD AUTO: 0.57 THOUSAND/ΜL (ref 0.17–1.22)
MONOCYTES NFR BLD AUTO: 8 % (ref 4–12)
NEUTROPHILS # BLD AUTO: 3.28 THOUSANDS/ΜL (ref 1.85–7.62)
NEUTS SEG NFR BLD AUTO: 49 % (ref 43–75)
NITRITE UR QL STRIP: NEGATIVE
NON-SQ EPI CELLS URNS QL MICRO: ABNORMAL /HPF
NRBC BLD AUTO-RTO: 0 /100 WBCS
NT-PROBNP SERPL-MCNC: 28 PG/ML
PH UR STRIP.AUTO: 6.5 [PH]
PLATELET # BLD AUTO: 226 THOUSANDS/UL (ref 149–390)
PLATELET # BLD AUTO: 245 THOUSANDS/UL (ref 149–390)
PMV BLD AUTO: 8.7 FL (ref 8.9–12.7)
PMV BLD AUTO: 8.8 FL (ref 8.9–12.7)
POTASSIUM SERPL-SCNC: 4.1 MMOL/L (ref 3.5–5.3)
PROT SERPL-MCNC: 8.2 G/DL (ref 6.4–8.2)
PROT UR STRIP-MCNC: NEGATIVE MG/DL
PROTHROMBIN TIME: 13.3 SECONDS (ref 11.6–14.5)
PROTOCOL NAME: NORMAL
RBC # BLD AUTO: 5.04 MILLION/UL (ref 3.88–5.62)
RBC #/AREA URNS AUTO: ABNORMAL /HPF
SODIUM 24H UR-SCNC: 165 MOL/L
SODIUM SERPL-SCNC: 139 MMOL/L (ref 136–145)
SP GR UR STRIP.AUTO: 1.01 (ref 1–1.03)
TARGET HR FORMULA: NORMAL
TEST INDICATION: NORMAL
TIME IN EXERCISE PHASE: NORMAL
TROPONIN I SERPL-MCNC: <0.02 NG/ML
TSH SERPL DL<=0.05 MIU/L-ACNC: 2.8 UIU/ML (ref 0.36–3.74)
UROBILINOGEN UR QL STRIP.AUTO: 0.2 E.U./DL
WBC # BLD AUTO: 6.85 THOUSAND/UL (ref 4.31–10.16)
WBC #/AREA URNS AUTO: ABNORMAL /HPF

## 2019-12-26 PROCEDURE — 99285 EMERGENCY DEPT VISIT HI MDM: CPT | Performed by: EMERGENCY MEDICINE

## 2019-12-26 PROCEDURE — 80053 COMPREHEN METABOLIC PANEL: CPT | Performed by: EMERGENCY MEDICINE

## 2019-12-26 PROCEDURE — 84484 ASSAY OF TROPONIN QUANT: CPT | Performed by: EMERGENCY MEDICINE

## 2019-12-26 PROCEDURE — 84300 ASSAY OF URINE SODIUM: CPT | Performed by: FAMILY MEDICINE

## 2019-12-26 PROCEDURE — 93017 CV STRESS TEST TRACING ONLY: CPT

## 2019-12-26 PROCEDURE — 84484 ASSAY OF TROPONIN QUANT: CPT | Performed by: FAMILY MEDICINE

## 2019-12-26 PROCEDURE — 99204 OFFICE O/P NEW MOD 45 MIN: CPT | Performed by: INTERNAL MEDICINE

## 2019-12-26 PROCEDURE — 82570 ASSAY OF URINE CREATININE: CPT | Performed by: FAMILY MEDICINE

## 2019-12-26 PROCEDURE — 99285 EMERGENCY DEPT VISIT HI MDM: CPT

## 2019-12-26 PROCEDURE — 36415 COLL VENOUS BLD VENIPUNCTURE: CPT | Performed by: EMERGENCY MEDICINE

## 2019-12-26 PROCEDURE — 71046 X-RAY EXAM CHEST 2 VIEWS: CPT

## 2019-12-26 PROCEDURE — 1124F ACP DISCUSS-NO DSCNMKR DOCD: CPT | Performed by: EMERGENCY MEDICINE

## 2019-12-26 PROCEDURE — 85025 COMPLETE CBC W/AUTO DIFF WBC: CPT | Performed by: EMERGENCY MEDICINE

## 2019-12-26 PROCEDURE — 85379 FIBRIN DEGRADATION QUANT: CPT | Performed by: INTERNAL MEDICINE

## 2019-12-26 PROCEDURE — 99220 PR INITIAL OBSERVATION CARE/DAY 70 MINUTES: CPT | Performed by: FAMILY MEDICINE

## 2019-12-26 PROCEDURE — 83880 ASSAY OF NATRIURETIC PEPTIDE: CPT | Performed by: EMERGENCY MEDICINE

## 2019-12-26 PROCEDURE — 81001 URINALYSIS AUTO W/SCOPE: CPT | Performed by: FAMILY MEDICINE

## 2019-12-26 PROCEDURE — 93018 CV STRESS TEST I&R ONLY: CPT | Performed by: INTERNAL MEDICINE

## 2019-12-26 PROCEDURE — 84443 ASSAY THYROID STIM HORMONE: CPT | Performed by: EMERGENCY MEDICINE

## 2019-12-26 PROCEDURE — 85730 THROMBOPLASTIN TIME PARTIAL: CPT | Performed by: EMERGENCY MEDICINE

## 2019-12-26 PROCEDURE — 85049 AUTOMATED PLATELET COUNT: CPT | Performed by: FAMILY MEDICINE

## 2019-12-26 PROCEDURE — 93005 ELECTROCARDIOGRAM TRACING: CPT

## 2019-12-26 PROCEDURE — 85610 PROTHROMBIN TIME: CPT | Performed by: EMERGENCY MEDICINE

## 2019-12-26 PROCEDURE — 93016 CV STRESS TEST SUPVJ ONLY: CPT | Performed by: INTERNAL MEDICINE

## 2019-12-26 RX ORDER — CITALOPRAM 20 MG/1
20 TABLET ORAL DAILY
Status: DISCONTINUED | OUTPATIENT
Start: 2019-12-27 | End: 2019-12-28 | Stop reason: HOSPADM

## 2019-12-26 RX ORDER — ALPRAZOLAM 0.5 MG/1
1 TABLET ORAL 3 TIMES DAILY PRN
Status: DISCONTINUED | OUTPATIENT
Start: 2019-12-26 | End: 2019-12-28 | Stop reason: HOSPADM

## 2019-12-26 RX ORDER — HEPARIN SODIUM 5000 [USP'U]/ML
5000 INJECTION, SOLUTION INTRAVENOUS; SUBCUTANEOUS EVERY 8 HOURS SCHEDULED
Status: DISCONTINUED | OUTPATIENT
Start: 2019-12-26 | End: 2019-12-27

## 2019-12-26 RX ORDER — ASPIRIN 81 MG/1
324 TABLET, CHEWABLE ORAL ONCE
Status: COMPLETED | OUTPATIENT
Start: 2019-12-26 | End: 2019-12-26

## 2019-12-26 RX ORDER — LEVOTHYROXINE SODIUM 0.07 MG/1
150 TABLET ORAL
Status: DISCONTINUED | OUTPATIENT
Start: 2019-12-27 | End: 2019-12-28 | Stop reason: HOSPADM

## 2019-12-26 RX ORDER — ALBUTEROL SULFATE 2.5 MG/3ML
2.5 SOLUTION RESPIRATORY (INHALATION) ONCE AS NEEDED
Status: DISCONTINUED | OUTPATIENT
Start: 2019-12-26 | End: 2019-12-30 | Stop reason: HOSPADM

## 2019-12-26 RX ORDER — ACETAMINOPHEN 325 MG/1
650 TABLET ORAL EVERY 6 HOURS PRN
Status: DISCONTINUED | OUTPATIENT
Start: 2019-12-26 | End: 2019-12-28 | Stop reason: HOSPADM

## 2019-12-26 RX ORDER — ONDANSETRON 2 MG/ML
4 INJECTION INTRAMUSCULAR; INTRAVENOUS EVERY 6 HOURS PRN
Status: DISCONTINUED | OUTPATIENT
Start: 2019-12-26 | End: 2019-12-28 | Stop reason: HOSPADM

## 2019-12-26 RX ORDER — ASPIRIN 81 MG/1
81 TABLET ORAL DAILY
Status: DISCONTINUED | OUTPATIENT
Start: 2019-12-27 | End: 2019-12-28 | Stop reason: HOSPADM

## 2019-12-26 RX ORDER — ATORVASTATIN CALCIUM 40 MG/1
40 TABLET, FILM COATED ORAL
Status: DISCONTINUED | OUTPATIENT
Start: 2019-12-26 | End: 2019-12-28 | Stop reason: HOSPADM

## 2019-12-26 RX ORDER — SODIUM CHLORIDE 9 MG/ML
100 INJECTION, SOLUTION INTRAVENOUS CONTINUOUS
Status: DISPENSED | OUTPATIENT
Start: 2019-12-26 | End: 2019-12-26

## 2019-12-26 RX ADMIN — HEPARIN SODIUM 5000 UNITS: 5000 INJECTION INTRAVENOUS; SUBCUTANEOUS at 13:19

## 2019-12-26 RX ADMIN — ASPIRIN 81 MG 324 MG: 81 TABLET ORAL at 13:15

## 2019-12-26 RX ADMIN — HEPARIN SODIUM 5000 UNITS: 5000 INJECTION INTRAVENOUS; SUBCUTANEOUS at 21:28

## 2019-12-26 RX ADMIN — ATORVASTATIN CALCIUM 40 MG: 40 TABLET, FILM COATED ORAL at 16:10

## 2019-12-26 RX ADMIN — SODIUM CHLORIDE 100 ML/HR: 0.9 INJECTION, SOLUTION INTRAVENOUS at 13:15

## 2019-12-26 RX ADMIN — ALPRAZOLAM 1 MG: 0.5 TABLET ORAL at 16:10

## 2019-12-26 RX ADMIN — ALPRAZOLAM 1 MG: 0.5 TABLET ORAL at 21:28

## 2019-12-26 NOTE — H&P
H&P- Jaclynn Denver 1950, 81 y o  male MRN: [id number]    Unit/Bed#: E4 -01 Encounter: [id number]    Primary Care Provider: REMY Mcgregor   Date and time admitted to hospital: 12/26/2019 10:28 AM        * Exertional chest pain  Assessment & Plan  Patient reports exertional chest pain, pressure and shortness of breath for the past 2 months  - patient is admitted to [facility] for ACS workup  - patient's exertional symptoms may also be related to pulmonary etiology, the patient does note having been using marijuana via vaporizer in the past 2 months  - will proceed with a V/Q scan and setting of an elevated D-dimer  - cardiology is consulted, input appreciated, low suspicion for cardiac etiology  - an echocardiogram has been ordered, will follow up results    Vasovagal syncope  Assessment & Plan  Patient had an episode of syncope lasted seconds, this occurred during his pulmonary function testing on appears vasovagal; he denies recurrent syncope episodes but does note exertional shortness of breath and chest pressure in the past 2 months  - the patient is admitted under observation to [facility] for ACS workup  - proceed with serial troponin, telemetry monitoring  - due to elevated D-dimer will also obtain a V/Q scan to rule out PE  - PT/OT evaluation prior to discharge    Acute kidney injury [facility])  Assessment & Plan  Acute kidney injury versus CKD stage 3, no prior baseline creatinine available  Proceed with IV fluids 100 mL/hr  Obtain urinalysis with urine sodium and creatinine, measure postvoid residuals  BMP in the morning    Class 2 severe obesity due to excess calories with serious comorbidity and body mass index (BMI) of 37 0 to 37 9 in adult [facility])  Assessment & Plan  Patient would benefit from lifestyle modifications    Anxiety  Assessment & Plan  Continue patient's Xanax  The patient notes that he occasionally uses marijuana through his wife's vaporizer, this may be related to patient's exertional shortness of breath in the past months, the patient is counseled regarding potential harmful side effects from vaping     Acquired hypothyroidism  Assessment & Plan  TSH in normal range  Continue levothyroxine      VTE Prophylaxis: Heparin  / sequential compression device   Code Status: Full  POLST: POLST form is not discussed and not completed at this time  Discussion with family: Patient    Anticipated Length of Stay:  Patient will be admitted on an Observation basis with an anticipated length of stay of less than 2 midnights  Justification for Hospital Stay: need for close monitoring, workup    Total Time for Visit, including Counseling / Coordination of Care: 1 hour  Greater than 50% of this total time spent on direct patient counseling and coordination of care  Chief Complaint:   Exertional chest pain/pressure with SOB, syncopal episode    History of Present Illness:    Jimmie Wright is a 81 y o  male with history of tobacco abuse, hypothyroidism, anxiety and obesity who presents with progressively worsening chest pain and pressure associated with shortness of breath  Due to persistence of the symptoms lasting for the past 2 months, the patient's primary care provider had ordered a cardiac stress test and pulmonary function test   The patient apparently did not tolerate the exercise stress test only being able to proceed for 3-1/2 minutes and subsequently becoming short of breath and noted for mild hypoxia  In regards the patient's pulmonary function test, the patient was noted to have a short syncopal episode lasting seconds during the exhalation portion of the test   The patient presented to the ER with the symptoms and was admitted for observation  The patient currently feels well, he denies chest pain, shortness of breath or palpitations at this time  The patient also reports good appetite and denies nausea, vomiting, diarrhea constipation  He denies urinary symptoms    He denies chills or fevers  The patient does note that his wife was recently prescribed medical marijuana and he has occasionally been using it in the past 2 months, however, states that due to of respiratory symptoms he has discontinued the use  Review of Systems:    Review of Systems   Constitutional: Positive for fatigue  Negative for fever  HENT: Negative for congestion  Eyes: Negative for visual disturbance  Respiratory: Positive for shortness of breath  Negative for wheezing  Gastrointestinal: Negative for abdominal pain, diarrhea, nausea and vomiting  Endocrine: Negative for polydipsia and polyuria  Genitourinary: Negative for dysuria  Musculoskeletal: Negative for gait problem  Skin: Negative for rash  Neurological: Positive for weakness  Hematological: Negative for adenopathy  Psychiatric/Behavioral: Negative for confusion  Past Medical and Surgical History:     Past Medical History:   Diagnosis Date    Back injury     Chronic back pain     Depression 9/16/2016    Thyroid disease        Past Surgical History:   Procedure Laterality Date    KNEE SURGERY      TONSILLECTOMY         Meds/Allergies:    Prior to Admission medications    Medication Sig Start Date End Date Taking? Authorizing Provider   ALPRAZolam Paddy Gutierrez) 2 MG tablet Take 1 tablet (2 mg total) by mouth 3 (three) times a day as needed for anxiety 12/9/19  Yes REMY Romero   citalopram (CeleXA) 20 mg tablet Take 1 tablet (20 mg total) by mouth daily 12/9/19  Yes REMY Swain   levothyroxine 150 mcg tablet Take 1 tablet (150 mcg total) by mouth daily 12/9/19  Yes REMY Swain     I have reviewed home medications with patient personally      Allergies: No Known Allergies    Social History:     Marital Status: /Civil Union   Occupation: not addressed  Patient Pre-hospital Living Situation: lives with wife  Patient Pre-hospital Level of Mobility: independent  Patient Pre-hospital Diet Restrictions: none  Substance Use History:   Social History     Substance and Sexual Activity   Alcohol Use Yes    Comment: occasional  1 drink/wk      Social History     Tobacco Use   Smoking Status Former Smoker    Packs/day: 0 50    Types: Cigarettes    Last attempt to quit:     Years since quittin 9   Smokeless Tobacco Never Used     Social History     Substance and Sexual Activity   Drug Use Yes    Frequency: 14 0 times per week    Types: Marijuana    Comment: medical marijuana since november       Family History:    Family History   Problem Relation Age of Onset    Hypothyroidism Mother     Mental illness Mother         disorder    Stroke Mother     Hypothyroidism Sister     Cancer Sister        Physical Exam:     Vitals:   Blood Pressure: 130/79 (19 1507)  Pulse: 65 (19 1507)  Temperature: 97 9 °F (36 6 °C) (19 1507)  Temp Source: Temporal (19 1507)  Respirations: 18 (19 1507)  Height: 6' 1" (185 4 cm) (19 1212)  Weight - Scale: 127 kg (279 lb 1 6 oz) (19 1212)  SpO2: 93 % (19 1507)    Physical Exam   Constitutional: He is oriented to person, place, and time  No distress  Overweight    HENT:   Head: Normocephalic and atraumatic  Eyes: Conjunctivae are normal    Neck: No JVD present  Cardiovascular: Normal rate and regular rhythm  No murmur heard  Pulmonary/Chest: Effort normal  No respiratory distress  He has no wheezes  He has no rales  Abdominal: Soft  He exhibits no distension  There is no tenderness  There is no guarding  Musculoskeletal: He exhibits no edema or tenderness  Neurological: He is alert and oriented to person, place, and time  Skin: Skin is warm and dry  Psychiatric: He has a normal mood and affect  Additional Data:     Lab Results: I have personally reviewed pertinent reports        Results from last 7 days   Lab Units 19  1331 19  1042   WBC Thousand/uL  --  6 85   HEMOGLOBIN g/dL  -- 15 9   HEMATOCRIT %  --  47 2   PLATELETS Thousands/uL 226 245   NEUTROS PCT %  --  49   LYMPHS PCT %  --  39   MONOS PCT %  --  8   EOS PCT %  --  3     Results from last 7 days   Lab Units 12/26/19  1042   SODIUM mmol/L 139   POTASSIUM mmol/L 4 1   CHLORIDE mmol/L 104   CO2 mmol/L 27   BUN mg/dL 16   CREATININE mg/dL 1 36*   ANION GAP mmol/L 8   CALCIUM mg/dL 9 4   ALBUMIN g/dL 4 2   TOTAL BILIRUBIN mg/dL 0 56   ALK PHOS U/L 71   ALT U/L 25   AST U/L 21   GLUCOSE RANDOM mg/dL 117     Results from last 7 days   Lab Units 12/26/19  1042   INR  1 00                   Imaging: I have personally reviewed pertinent reports  XR chest 2 views   Final Result by Tracy Mcdermott MD (12/26 1310)      No acute cardiopulmonary disease  Workstation performed: [id number]         NM inpatient order    (Results Pending)       EKG, Pathology, and Other Studies Reviewed on Admission:   · EKG: reviewed    Allscripts / Epic Records Reviewed: Yes     ** Please Note: This note has been constructed using a voice recognition system   **

## 2019-12-26 NOTE — ED PROVIDER NOTES
History  Chief Complaint   Patient presents with    Syncope     pt seen as outpatient for pulmonary function test and pt states he was told he passed out  pt states he has been having heaviness and sob since beginning of November  pt also had a stress test earlier this am         History provided by:  Patient   used: No    Syncope   Episode history:  Single  Most recent episode: Today  Timing:  Sporadic  Progression:  Resolved  Chronicity:  New  Context: sitting down    Context comment:  Chest pressure, Dyspnea, had Stress Test today, then passed out during PFTs  Witnessed: yes    Relieved by:  Nothing  Worsened by:  Nothing  Ineffective treatments:  None tried  Associated symptoms: chest pain and shortness of breath    Associated symptoms: no dizziness, no fever, no headaches, no nausea, no vomiting and no weakness    Chest pain:     Quality: pressure      Severity:  Moderate    Onset quality:  Gradual    Duration:  1 month    Timing:  Intermittent    Progression:  Waxing and waning    Chronicity:  New  Shortness of breath:     Severity:  Moderate    Onset quality:  Gradual    Duration:  1 month    Timing:  Intermittent    Progression:  Waxing and waning  Risk factors comment:  Hypothryoidism      Prior to Admission Medications   Prescriptions Last Dose Informant Patient Reported? Taking?    ALPRAZolam (XANAX) 2 MG tablet 12/26/2019 at Unknown time  No Yes   Sig: Take 1 tablet (2 mg total) by mouth 3 (three) times a day as needed for anxiety   citalopram (CeleXA) 20 mg tablet 12/26/2019 at Unknown time  No Yes   Sig: Take 1 tablet (20 mg total) by mouth daily   levothyroxine 150 mcg tablet 12/26/2019 at Unknown time  No Yes   Sig: Take 1 tablet (150 mcg total) by mouth daily      Facility-Administered Medications: None       Past Medical History:   Diagnosis Date    Back injury     Chronic back pain     Depression 9/16/2016    Thyroid disease        Past Surgical History:   Procedure Laterality Date    KNEE SURGERY      TONSILLECTOMY         Family History   Problem Relation Age of Onset    Hypothyroidism Mother     Mental illness Mother         disorder    Stroke Mother     Hypothyroidism Sister     Cancer Sister      I have reviewed and agree with the history as documented  Social History     Tobacco Use    Smoking status: Former Smoker     Packs/day: 0 50     Types: Cigarettes     Last attempt to quit:      Years since quittin 9    Smokeless tobacco: Never Used   Substance Use Topics    Alcohol use: Yes     Comment: occasional  1 drink/wk     Drug use: Yes     Frequency: 14 0 times per week     Types: Marijuana     Comment: medical marijuana since november        Review of Systems   Constitutional: Negative for chills and fever  HENT: Negative for facial swelling, sore throat and trouble swallowing  Eyes: Negative for pain and visual disturbance  Respiratory: Positive for shortness of breath  Negative for cough and chest tightness  Cardiovascular: Positive for chest pain and syncope  Negative for leg swelling  Gastrointestinal: Negative for abdominal pain, blood in stool, diarrhea, nausea and vomiting  Genitourinary: Negative for dysuria and flank pain  Musculoskeletal: Negative for back pain, neck pain and neck stiffness  Skin: Negative for pallor and rash  Allergic/Immunologic: Negative for environmental allergies and immunocompromised state  Neurological: Negative for dizziness, weakness and headaches  Hematological: Negative for adenopathy  Does not bruise/bleed easily  Psychiatric/Behavioral: Negative for agitation and behavioral problems  All other systems reviewed and are negative  Physical Exam  Physical Exam   Constitutional: He is oriented to person, place, and time  He appears well-developed and well-nourished  No distress  HENT:   Head: Normocephalic and atraumatic  Eyes: EOM are normal    Neck: Normal range of motion  Neck supple  Cardiovascular: Normal rate, regular rhythm, normal heart sounds and intact distal pulses  Pulmonary/Chest: Effort normal and breath sounds normal    Abdominal: Soft  Bowel sounds are normal  There is no tenderness  There is no rebound and no guarding  Musculoskeletal: Normal range of motion  Neurological: He is alert and oriented to person, place, and time  Skin: Skin is warm and dry  Psychiatric: He has a normal mood and affect  Nursing note and vitals reviewed        Vital Signs  ED Triage Vitals [12/26/19 1026]   Temperature Pulse Respirations Blood Pressure SpO2   97 7 °F (36 5 °C) 93 18 151/91 95 %      Temp Source Heart Rate Source Patient Position - Orthostatic VS BP Location FiO2 (%)   Temporal Monitor Sitting Right arm --      Pain Score       No Pain           Vitals:    12/26/19 1026 12/26/19 1212 12/26/19 1507 12/26/19 1925   BP: 151/91 131/92 130/79 115/77   Pulse: 93 78 65 69   Patient Position - Orthostatic VS: Sitting Lying Lying Lying         Visual Acuity      ED Medications  Medications   ALPRAZolam (XANAX) tablet 1 mg (1 mg Oral Given 12/26/19 1610)   citalopram (CeleXA) tablet 20 mg (has no administration in time range)   levothyroxine tablet 150 mcg (has no administration in time range)   ondansetron (ZOFRAN) injection 4 mg (has no administration in time range)   heparin (porcine) subcutaneous injection 5,000 Units (5,000 Units Subcutaneous Given 12/26/19 1319)   acetaminophen (TYLENOL) tablet 650 mg (has no administration in time range)   aspirin (ECOTRIN LOW STRENGTH) EC tablet 81 mg (has no administration in time range)   atorvastatin (LIPITOR) tablet 40 mg (40 mg Oral Given 12/26/19 1610)   sodium chloride 0 9 % infusion (100 mL/hr Intravenous New Bag 12/26/19 1315)   aspirin chewable tablet 324 mg (324 mg Oral Given 12/26/19 1315)       Diagnostic Studies  Results Reviewed     Procedure Component Value Units Date/Time    Troponin I [[id number]]  (Normal) Collected:  12/26/19 1652    Lab Status:  Final result Specimen:  Blood from Arm, Right Updated:  12/26/19 1732     Troponin I <0 02 ng/mL     Urinalysis with microscopic [[id number]]  (Abnormal) Collected:  12/26/19 1530    Lab Status:  Final result Specimen:  Urine, Clean Catch Updated:  12/26/19 1615     Clarity, UA Clear     Color, UA Yellow     Specific Gravity, UA 1 015     pH, UA 6 5     Glucose, UA Negative mg/dl      Ketones, UA Negative mg/dl      Blood, UA Trace-Intact     Protein, UA Negative mg/dl      Nitrite, UA Negative     Bilirubin, UA Negative     Urobilinogen, UA 0 2 E U /dl      Leukocytes, UA Negative     WBC, UA 0-1 /hpf      RBC, UA 0-1 /hpf      Bacteria, UA None Seen /hpf      Epithelial Cells Occasional /hpf     D-dimer, quantitative [[id number]]  (Abnormal) Collected:  12/26/19 1042    Lab Status:  Final result Specimen:  Blood from Arm, Left Updated:  12/26/19 1527     D-Dimer, Quant 3 52 ug/ml FEU     Troponin I [[id number]]  (Normal) Collected:  12/26/19 1331    Lab Status:  Final result Specimen:  Blood from Arm, Right Updated:  12/26/19 1406     Troponin I <0 02 ng/mL     Platelet count [[id number]]  (Abnormal) Collected:  12/26/19 1331    Lab Status:  Final result Specimen:  Blood from Arm, Right Updated:  12/26/19 1341     Platelets 930 Thousands/uL      MPV 8 8 fL     Sodium, urine, random [[id number]] Collected:  12/26/19 1309    Lab Status:  Final result Specimen:  Urine, Catheter Updated:  12/26/19 1331     Sodium, Ur 165    Creatinine, urine, random [[id number]] Collected:  12/26/19 1309    Lab Status:  Final result Specimen:  Urine, Catheter Updated:  12/26/19 1331     Creatinine, Ur 115 8 mg/dL     Troponin I [[id number]]     Lab Status:  No result Specimen:  Blood     TSH [[id number]]  (Normal) Collected:  12/26/19 1042    Lab Status:  Final result Specimen:  Blood from Arm, Left Updated:  12/26/19 1135     TSH 3RD GENERATON 2 802 uIU/mL     Narrative:       Patients undergoing fluorescein dye angiography may retain small amounts of fluorescein in the body for 48-72 hours post procedure  Samples containing fluorescein can produce falsely depressed TSH values  If the patient had this procedure,a specimen should be resubmitted post fluorescein clearance        NT-BNP PRO [[id number]]  (Normal) Collected:  12/26/19 1042    Lab Status:  Final result Specimen:  Blood from Arm, Left Updated:  12/26/19 1126     NT-proBNP 28 pg/mL     Protime-INR [[id number]]  (Normal) Collected:  12/26/19 1042    Lab Status:  Final result Specimen:  Blood from Arm, Left Updated:  12/26/19 1117     Protime 13 3 seconds      INR 1 00    APTT [[id number]]  (Normal) Collected:  12/26/19 1042    Lab Status:  Final result Specimen:  Blood from Arm, Left Updated:  12/26/19 1117     PTT 25 seconds     Troponin I [[id number]]  (Normal) Collected:  12/26/19 1042    Lab Status:  Final result Specimen:  Blood from Arm, Left Updated:  12/26/19 1107     Troponin I <0 02 ng/mL     Comprehensive metabolic panel [[id number]]  (Abnormal) Collected:  12/26/19 1042    Lab Status:  Final result Specimen:  Blood from Arm, Left Updated:  12/26/19 1105     Sodium 139 mmol/L      Potassium 4 1 mmol/L      Chloride 104 mmol/L      CO2 27 mmol/L      ANION GAP 8 mmol/L      BUN 16 mg/dL      Creatinine 1 36 mg/dL      Glucose 117 mg/dL      Calcium 9 4 mg/dL      AST 21 U/L      ALT 25 U/L      Alkaline Phosphatase 71 U/L      Total Protein 8 2 g/dL      Albumin 4 2 g/dL      Total Bilirubin 0 56 mg/dL      eGFR 53 ml/min/1 73sq m     [facility]:       [facility] guidelines for Chronic Kidney Disease (CKD):     Stage 1 with normal or high GFR (GFR > 90 mL/min/1 73 square meters)    Stage 2 Mild CKD (GFR = 60-89 mL/min/1 73 square meters)    Stage 3A Moderate CKD (GFR = 45-59 mL/min/1 73 square meters)    Stage 3B Moderate CKD (GFR = 30-44 mL/min/1 73 square meters)    Stage 4 Severe CKD (GFR = 15-29 mL/min/1 73 square meters)    Stage 5 End Stage CKD (GFR <15 mL/min/1 73 square meters)  Note: GFR calculation is accurate only with a steady state creatinine    CBC and differential [[id number]]  (Abnormal) Collected:  12/26/19 1042    Lab Status:  Final result Specimen:  Blood from Arm, Left Updated:  12/26/19 1049     WBC 6 85 Thousand/uL      RBC 5 04 Million/uL      Hemoglobin 15 9 g/dL      Hematocrit 47 2 %      MCV 94 fL      MCH 31 5 pg      MCHC 33 7 g/dL      RDW 12 0 %      MPV 8 7 fL      Platelets 507 Thousands/uL      nRBC 0 /100 WBCs      Neutrophils Relative 49 %      Immat GRANS % 0 %      Lymphocytes Relative 39 %      Monocytes Relative 8 %      Eosinophils Relative 3 %      Basophils Relative 1 %      Neutrophils Absolute 3 28 Thousands/µL      Immature Grans Absolute 0 02 Thousand/uL      Lymphocytes Absolute 2 70 Thousands/µL      Monocytes Absolute 0 57 Thousand/µL      Eosinophils Absolute 0 23 Thousand/µL      Basophils Absolute 0 05 Thousands/µL                  XR chest 2 views   Final Result by Jami Saeed MD (12/26 1310)      No acute cardiopulmonary disease              Workstation performed: [id number]         NM inpatient order    (Results Pending)              Procedures  ECG 12 Lead Documentation Only  Date/Time: 12/26/2019 11:02 AM  Performed by: Jaye Bone MD  Authorized by: Jaye Bone MD     Indications / Diagnosis:  Chest pressure, Dyspnea  ECG reviewed by me, the ED Provider: yes    Patient location:  ED  Interpretation:     Interpretation: normal    Rate:     ECG rate:  85    ECG rate assessment: normal    Rhythm:     Rhythm: sinus rhythm    Ectopy:     Ectopy: none    QRS:     QRS axis:  Normal  Conduction:     Conduction: normal    ST segments:     ST segments:  Normal  T waves:     T waves: normal               ED Course  ED Course as of Dec 26 2005   Thu Dec 26, 2019   1137 WBC: 6 85   1137 Hemoglobin: 15 9   1137 Sodium: 139   1137 Potassium: 4 1   1137 BUN: 16   1137 Creatinine(!): 1 36   1137 Troponin I: <0 02   1137 NT-proBNP: 28   1137 Labs reviewed, mild elevation of creatinine noted, troponin, BNP, TSH within normal limits  [facility]   1137 Case discussed with Dr Florentin Oconnor, cardiology, looked at the stress test, patient on did 3-1/2 minutes, agree with hospital observation for ACS rule out  1144 Patient informed about the workup results, cardiology recommendation, agree with hospital observation  Case discussed with Dr Ranjeet Arzate, [address] [place] [address]  Note: D-dimer was added by admitting/consulting services after patient was admitted; I was not made aware; result carried into the chart upon refreshing prior to signing the chart  MDM  Number of Diagnoses or Management Options  Chest pain: new and requires workup  Dyspnea: new and requires workup  Syncope: new and requires workup  Diagnosis management comments: Patient is a 74-year-old male, ex-smoker, Vapes marijuana twice a week, , history of hypothyroidism, anxiety/depression; comes in with complaints of chest pressure, exertional dyspnea, going on since last month, intermittently, states that yesterday on Christmas he felt really winded, going up and down carrying gifts; patient was scheduled to have stress test and pulmonary function tests today morning; completed 3-1/2 minutes of the stress test; passed out briefly during exhalation part of the pulmonary function test; came to the ER for evaluation  On exam patient with obese built, conscious, alert, vital signs stable, lungs are clear, heart sounds are normal, abdomen soft nontender, no peripheral edema, no calf tenderness swelling  Differential diagnosis:  ACS, syncope, ex-smoker, vaping history  Will check labs, EKG, chest x-ray, consult Cardiology as patient had a stress test done today, admit for observation         Amount and/or Complexity of Data Reviewed  Clinical lab tests: reviewed and ordered  Tests in the radiology section of CPT®: ordered and reviewed  Tests in the medicine section of CPT®: ordered and reviewed  Discuss the patient with other providers: yes  Independent visualization of images, tracings, or specimens: yes          Disposition  Final diagnoses:   Syncope   Chest pain   Dyspnea     Time reflects when diagnosis was documented in both MDM as applicable and the Disposition within this note     Time User Action Codes Description Comment    12/26/2019 11:02 AM [place] [facility] Add [R55] Syncope     12/26/2019 11:02 AM [place] [facility] Add [R07 9] Chest pain     12/26/2019 11:02 AM [place] [facility] Add [R06 00] Dyspnea       ED Disposition     ED Disposition Condition Date/Time Comment    Admit Stable Thu Dec 26, 2019 11:43 AM Case was discussed with Dr Lord Engle and the patient's admission status was agreed to be Admission Status: observation status to the service of Dr Lord Engle  Follow-up Information    None         Current Discharge Medication List      CONTINUE these medications which have NOT CHANGED    Details   ALPRAZolam (XANAX) 2 MG tablet Take 1 tablet (2 mg total) by mouth 3 (three) times a day as needed for anxiety  Qty: 270 tablet, Refills: 0    Associated Diagnoses: Anxiety      citalopram (CeleXA) 20 mg tablet Take 1 tablet (20 mg total) by mouth daily  Qty: 90 tablet, Refills: 1    Associated Diagnoses: Anxiety      levothyroxine 150 mcg tablet Take 1 tablet (150 mcg total) by mouth daily  Qty: 90 tablet, Refills: 1    Associated Diagnoses: Acquired hypothyroidism           No discharge procedures on file      ED Provider  Electronically Signed by           Katie Spaulding MD  12/26/19 2005       Katie Spaulding MD  12/28/19 [id number]

## 2019-12-26 NOTE — ASSESSMENT & PLAN NOTE
Continue patient's Xanax  The patient notes that he occasionally uses marijuana through his wife's vaporizer, this may be related to patient's exertional shortness of breath in the past months, the patient is counseled regarding potential harmful side effects from vaping

## 2019-12-26 NOTE — ASSESSMENT & PLAN NOTE
Patient had an episode of syncope lasted seconds, this occurred during his pulmonary function testing on appears vasovagal; he denies recurrent syncope episodes but does note exertional shortness of breath and chest pressure in the past 2 months  - the patient is admitted under observation to med surg for ACS workup  - proceed with serial troponin, telemetry monitoring  - due to elevated D-dimer will also obtain a V/Q scan to rule out PE  - PT/OT evaluation prior to discharge

## 2019-12-26 NOTE — ASSESSMENT & PLAN NOTE
Acute kidney injury versus CKD stage 3, no prior baseline creatinine available  Proceed with IV fluids 100 mL/hr  Obtain urinalysis with urine sodium and creatinine, measure postvoid residuals  BMP in the morning
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
## 2019-12-26 NOTE — ASSESSMENT & PLAN NOTE
Patient reports exertional chest pain, pressure and shortness of breath for the past 2 months  - patient is admitted to [facility] for ACS workup  - patient's exertional symptoms may also be related to pulmonary etiology, the patient does note having been using marijuana via vaporizer in the past 2 months  - will proceed with a V/Q scan and setting of an elevated D-dimer  - cardiology is consulted, input appreciated, low suspicion for cardiac etiology  - an echocardiogram has been ordered, will follow up results

## 2019-12-26 NOTE — PLAN OF CARE
Problem: PAIN - ADULT  Goal: Verbalizes/displays adequate comfort level or baseline comfort level  Description  Interventions:  - Encourage patient to monitor pain and request assistance  - Assess pain using appropriate pain scale  - Administer analgesics based on type and severity of pain and evaluate response  - Implement non-pharmacological measures as appropriate and evaluate response  - Consider cultural and social influences on pain and pain management  - Notify physician/advanced practitioner if interventions unsuccessful or patient reports new pain  Outcome: Progressing     Problem: DISCHARGE PLANNING  Goal: Discharge to home or other facility with appropriate resources  Description  INTERVENTIONS:  - Identify barriers to discharge w/patient and caregiver  - Arrange for needed discharge resources and transportation as appropriate  - Identify discharge learning needs (meds, wound care, etc )  - Arrange for interpretive services to assist at discharge as needed  - Refer to Case Management Department for coordinating discharge planning if the patient needs post-hospital services based on physician/advanced practitioner order or complex needs related to functional status, cognitive ability, or social support system  Outcome: Progressing     Problem: Knowledge Deficit  Goal: Patient/family/caregiver demonstrates understanding of disease process, treatment plan, medications, and discharge instructions  Description  Complete learning assessment and assess knowledge base    Interventions:  - Provide teaching at level of understanding  - Provide teaching via preferred learning methods  Outcome: Progressing     Problem: CARDIOVASCULAR - ADULT  Goal: Maintains optimal cardiac output and hemodynamic stability  Description  INTERVENTIONS:  - Monitor I/O, vital signs and rhythm  - Monitor for S/S and trends of decreased cardiac output  - Administer and titrate ordered vasoactive medications to optimize hemodynamic stability  - Assess quality of pulses, skin color and temperature  - Assess for signs of decreased coronary artery perfusion  - Instruct patient to report change in severity of symptoms  Outcome: Progressing  Goal: Absence of cardiac dysrhythmias or at baseline rhythm  Description  INTERVENTIONS:  - Continuous cardiac monitoring, vital signs, obtain 12 lead EKG if ordered  - Administer antiarrhythmic and heart rate control medications as ordered  - Monitor electrolytes and administer replacement therapy as ordered  Outcome: Progressing

## 2019-12-27 ENCOUNTER — APPOINTMENT (OUTPATIENT)
Dept: NON INVASIVE DIAGNOSTICS | Facility: HOSPITAL | Age: 69
DRG: 176 | End: 2019-12-27
Payer: MEDICARE

## 2019-12-27 ENCOUNTER — APPOINTMENT (OUTPATIENT)
Dept: CT IMAGING | Facility: HOSPITAL | Age: 69
DRG: 176 | End: 2019-12-27
Payer: MEDICARE

## 2019-12-27 PROBLEM — I26.99 BILATERAL PULMONARY EMBOLISM (HCC): Status: ACTIVE | Noted: 2019-12-27

## 2019-12-27 LAB
ANION GAP SERPL CALCULATED.3IONS-SCNC: 7 MMOL/L (ref 4–13)
APTT PPP: 28 SECONDS (ref 23–37)
APTT PPP: >210 SECONDS (ref 23–37)
ATRIAL RATE: 86 BPM
BASOPHILS # BLD AUTO: 0.04 THOUSANDS/ΜL (ref 0–0.1)
BASOPHILS NFR BLD AUTO: 1 % (ref 0–1)
BUN SERPL-MCNC: 16 MG/DL (ref 5–25)
CALCIUM SERPL-MCNC: 9 MG/DL (ref 8.3–10.1)
CHLORIDE SERPL-SCNC: 105 MMOL/L (ref 100–108)
CHOLEST SERPL-MCNC: 154 MG/DL (ref 50–200)
CO2 SERPL-SCNC: 27 MMOL/L (ref 21–32)
CREAT SERPL-MCNC: 1.21 MG/DL (ref 0.6–1.3)
EOSINOPHIL # BLD AUTO: 0.31 THOUSAND/ΜL (ref 0–0.61)
EOSINOPHIL NFR BLD AUTO: 5 % (ref 0–6)
ERYTHROCYTE [DISTWIDTH] IN BLOOD BY AUTOMATED COUNT: 11.8 % (ref 11.6–15.1)
ERYTHROCYTE [DISTWIDTH] IN BLOOD BY AUTOMATED COUNT: 12 % (ref 11.6–15.1)
EST. AVERAGE GLUCOSE BLD GHB EST-MCNC: 123 MG/DL
GFR SERPL CREATININE-BSD FRML MDRD: 62 ML/MIN/1.73SQ M
GLUCOSE P FAST SERPL-MCNC: 117 MG/DL (ref 65–99)
GLUCOSE SERPL-MCNC: 117 MG/DL (ref 65–140)
HBA1C MFR BLD: 5.9 % (ref 4.2–6.3)
HCT VFR BLD AUTO: 44.2 % (ref 36.5–49.3)
HCT VFR BLD AUTO: 45.5 % (ref 36.5–49.3)
HDLC SERPL-MCNC: 31 MG/DL
HGB BLD-MCNC: 14.9 G/DL (ref 12–17)
HGB BLD-MCNC: 15 G/DL (ref 12–17)
IMM GRANULOCYTES # BLD AUTO: 0.02 THOUSAND/UL (ref 0–0.2)
IMM GRANULOCYTES NFR BLD AUTO: 0 % (ref 0–2)
INR PPP: 0.99 (ref 0.84–1.19)
LDLC SERPL CALC-MCNC: 101 MG/DL (ref 0–100)
LYMPHOCYTES # BLD AUTO: 2.41 THOUSANDS/ΜL (ref 0.6–4.47)
LYMPHOCYTES NFR BLD AUTO: 41 % (ref 14–44)
MAGNESIUM SERPL-MCNC: 2.3 MG/DL (ref 1.6–2.6)
MCH RBC QN AUTO: 31.5 PG (ref 26.8–34.3)
MCH RBC QN AUTO: 31.6 PG (ref 26.8–34.3)
MCHC RBC AUTO-ENTMCNC: 32.7 G/DL (ref 31.4–37.4)
MCHC RBC AUTO-ENTMCNC: 33.9 G/DL (ref 31.4–37.4)
MCV RBC AUTO: 93 FL (ref 82–98)
MCV RBC AUTO: 96 FL (ref 82–98)
MONOCYTES # BLD AUTO: 0.54 THOUSAND/ΜL (ref 0.17–1.22)
MONOCYTES NFR BLD AUTO: 9 % (ref 4–12)
NEUTROPHILS # BLD AUTO: 2.56 THOUSANDS/ΜL (ref 1.85–7.62)
NEUTS SEG NFR BLD AUTO: 44 % (ref 43–75)
NONHDLC SERPL-MCNC: 123 MG/DL
NRBC BLD AUTO-RTO: 0 /100 WBCS
P AXIS: 57 DEGREES
PLATELET # BLD AUTO: 210 THOUSANDS/UL (ref 149–390)
PLATELET # BLD AUTO: 230 THOUSANDS/UL (ref 149–390)
PMV BLD AUTO: 8.7 FL (ref 8.9–12.7)
PMV BLD AUTO: 8.9 FL (ref 8.9–12.7)
POTASSIUM SERPL-SCNC: 4.5 MMOL/L (ref 3.5–5.3)
PR INTERVAL: 152 MS
PROTHROMBIN TIME: 13.2 SECONDS (ref 11.6–14.5)
QRS AXIS: -11 DEGREES
QRSD INTERVAL: 86 MS
QT INTERVAL: 362 MS
QTC INTERVAL: 430 MS
RBC # BLD AUTO: 4.73 MILLION/UL (ref 3.88–5.62)
RBC # BLD AUTO: 4.74 MILLION/UL (ref 3.88–5.62)
SODIUM SERPL-SCNC: 139 MMOL/L (ref 136–145)
T WAVE AXIS: 12 DEGREES
TRIGL SERPL-MCNC: 112 MG/DL
VENTRICULAR RATE: 85 BPM
WBC # BLD AUTO: 5.88 THOUSAND/UL (ref 4.31–10.16)
WBC # BLD AUTO: 5.94 THOUSAND/UL (ref 4.31–10.16)

## 2019-12-27 PROCEDURE — G8987 SELF CARE CURRENT STATUS: HCPCS

## 2019-12-27 PROCEDURE — 83036 HEMOGLOBIN GLYCOSYLATED A1C: CPT | Performed by: FAMILY MEDICINE

## 2019-12-27 PROCEDURE — 97163 PT EVAL HIGH COMPLEX 45 MIN: CPT

## 2019-12-27 PROCEDURE — 83735 ASSAY OF MAGNESIUM: CPT | Performed by: FAMILY MEDICINE

## 2019-12-27 PROCEDURE — 80048 BASIC METABOLIC PNL TOTAL CA: CPT | Performed by: FAMILY MEDICINE

## 2019-12-27 PROCEDURE — G8988 SELF CARE GOAL STATUS: HCPCS

## 2019-12-27 PROCEDURE — 99233 SBSQ HOSP IP/OBS HIGH 50: CPT | Performed by: FAMILY MEDICINE

## 2019-12-27 PROCEDURE — G8979 MOBILITY GOAL STATUS: HCPCS

## 2019-12-27 PROCEDURE — 93306 TTE W/DOPPLER COMPLETE: CPT | Performed by: INTERNAL MEDICINE

## 2019-12-27 PROCEDURE — 85730 THROMBOPLASTIN TIME PARTIAL: CPT | Performed by: FAMILY MEDICINE

## 2019-12-27 PROCEDURE — 80061 LIPID PANEL: CPT | Performed by: FAMILY MEDICINE

## 2019-12-27 PROCEDURE — 85610 PROTHROMBIN TIME: CPT | Performed by: FAMILY MEDICINE

## 2019-12-27 PROCEDURE — 85027 COMPLETE CBC AUTOMATED: CPT | Performed by: FAMILY MEDICINE

## 2019-12-27 PROCEDURE — 93010 ELECTROCARDIOGRAM REPORT: CPT | Performed by: INTERNAL MEDICINE

## 2019-12-27 PROCEDURE — C8929 TTE W OR WO FOL WCON,DOPPLER: HCPCS

## 2019-12-27 PROCEDURE — 71275 CT ANGIOGRAPHY CHEST: CPT

## 2019-12-27 PROCEDURE — 85025 COMPLETE CBC W/AUTO DIFF WBC: CPT | Performed by: FAMILY MEDICINE

## 2019-12-27 PROCEDURE — 97166 OT EVAL MOD COMPLEX 45 MIN: CPT

## 2019-12-27 PROCEDURE — G8989 SELF CARE D/C STATUS: HCPCS

## 2019-12-27 PROCEDURE — G8978 MOBILITY CURRENT STATUS: HCPCS

## 2019-12-27 RX ORDER — HEPARIN SODIUM 1000 [USP'U]/ML
5000 INJECTION, SOLUTION INTRAVENOUS; SUBCUTANEOUS AS NEEDED
Status: DISCONTINUED | OUTPATIENT
Start: 2019-12-27 | End: 2019-12-28

## 2019-12-27 RX ORDER — HEPARIN SODIUM 10000 [USP'U]/100ML
3-30 INJECTION, SOLUTION INTRAVENOUS
Status: DISCONTINUED | OUTPATIENT
Start: 2019-12-27 | End: 2019-12-28

## 2019-12-27 RX ORDER — HEPARIN SODIUM 1000 [USP'U]/ML
10000 INJECTION, SOLUTION INTRAVENOUS; SUBCUTANEOUS ONCE
Status: COMPLETED | OUTPATIENT
Start: 2019-12-27 | End: 2019-12-27

## 2019-12-27 RX ORDER — HEPARIN SODIUM 1000 [USP'U]/ML
10000 INJECTION, SOLUTION INTRAVENOUS; SUBCUTANEOUS AS NEEDED
Status: DISCONTINUED | OUTPATIENT
Start: 2019-12-27 | End: 2019-12-28

## 2019-12-27 RX ORDER — SODIUM CHLORIDE 9 MG/ML
100 INJECTION, SOLUTION INTRAVENOUS CONTINUOUS
Status: DISCONTINUED | OUTPATIENT
Start: 2019-12-27 | End: 2019-12-27

## 2019-12-27 RX ADMIN — LEVOTHYROXINE SODIUM 150 MCG: 75 TABLET ORAL at 05:46

## 2019-12-27 RX ADMIN — ALPRAZOLAM 1 MG: 0.5 TABLET ORAL at 23:28

## 2019-12-27 RX ADMIN — IOHEXOL 85 ML: 350 INJECTION, SOLUTION INTRAVENOUS at 09:45

## 2019-12-27 RX ADMIN — ALPRAZOLAM 1 MG: 0.5 TABLET ORAL at 16:55

## 2019-12-27 RX ADMIN — ASPIRIN 81 MG: 81 TABLET, COATED ORAL at 08:28

## 2019-12-27 RX ADMIN — HEPARIN SODIUM 10000 UNITS: 1000 INJECTION, SOLUTION INTRAVENOUS; SUBCUTANEOUS at 11:57

## 2019-12-27 RX ADMIN — HEPARIN SODIUM 5000 UNITS: 5000 INJECTION INTRAVENOUS; SUBCUTANEOUS at 05:46

## 2019-12-27 RX ADMIN — ATORVASTATIN CALCIUM 40 MG: 40 TABLET, FILM COATED ORAL at 16:50

## 2019-12-27 RX ADMIN — ALPRAZOLAM 1 MG: 0.5 TABLET ORAL at 10:34

## 2019-12-27 RX ADMIN — CITALOPRAM HYDROBROMIDE 20 MG: 20 TABLET ORAL at 08:28

## 2019-12-27 RX ADMIN — HEPARIN SODIUM AND DEXTROSE 18 UNITS/KG/HR: 10000; 5 INJECTION INTRAVENOUS at 11:54

## 2019-12-27 RX ADMIN — PERFLUTREN 0.6 ML/MIN: 6.52 INJECTION, SUSPENSION INTRAVENOUS at 08:45

## 2019-12-27 RX ADMIN — SODIUM CHLORIDE 100 ML/HR: 0.9 INJECTION, SOLUTION INTRAVENOUS at 10:06

## 2019-12-27 NOTE — ASSESSMENT & PLAN NOTE
Patient had an episode of syncope lasting seconds, this occurred during his pulmonary function testing on appears vasovagal; he denies recurrent syncope episodes but does note exertional shortness of breath and chest pressure in the past 2 months  - troponin remained negative  - CT of the chest revealed bilateral pulmonary emboli with possible right heart strain, 2D echo did not demonstrate right heart strain  - PT/OT evaluation prior to discharge

## 2019-12-27 NOTE — ASSESSMENT & PLAN NOTE
Acute kidney injury versus CKD stage 3, no prior baseline creatinine available  Creatinine normalized with IV fluids to 1 2  The patient underwent a CT of the chest today, will recheck BMP in the morning

## 2019-12-27 NOTE — OCCUPATIONAL THERAPY NOTE
[address] Evaluation     Patient Name: George Valenzuela  Today's Date: 12/27/2019  Problem List  Principal Problem:    Exertional chest pain  Active Problems:    Acquired hypothyroidism    Anxiety    Vasovagal syncope    Class 2 severe obesity due to excess calories with serious comorbidity and body mass index (BMI) of 37 0 to 37 9 in adult [facility])    Acute kidney injury [facility])    Past Medical History  Past Medical History:   Diagnosis Date    Back injury     Chronic back pain     Depression 9/16/2016    Thyroid disease      Past Surgical History  Past Surgical History:   Procedure Laterality Date    KNEE SURGERY      TONSILLECTOMY             12/27/19 1209   Note Type   Note type Eval only   Restrictions/Precautions   Weight Bearing Precautions Per Order No   Other Precautions Telemetry; Fall Risk;Multiple lines   Pain Assessment   Pain Assessment No/denies pain   Pain Score No Pain   Home Living   Type of [address] [place] [address] One level;Stairs to enter with rails  (2 LEO)   Bathroom Shower/Tub Walk-in shower   Bathroom Toilet Raised   Bathroom Equipment Grab bars in shower   [address] Other (Comment)  (denies DME)   Additional Comments Pt lives with spouse and dtr in a one level ranch home with 2 LEO  Prior Function   Level of [place] Independent with ADLs and functional mobility   Lives With Spouse;Daughter   Receives Help From Family   ADL Assistance Independent   IADLs Independent   Falls in the last 6 months 0   Vocational Other (Comment)  (Pt reports recently laid off)   Lifestyle   Autonomy At baseline, pt was I w/ ADLs, IADLs, and functional mobility/transfers w/o use of AD, (+) , and reports 0 falls PTA     Reciprocal Relationships Spouse, dtr   Service to Others Pt reports recently laid off   Intrinsic Gratification Walking 5 dogs   Psychosocial   Psychosocial (WDL) WDL   ADL   Where Assessed Edge of bed   Eating Assistance 7 Independent   Grooming Assistance 6  Modified Independent   UB Bathing Assistance 6  Modified Independent   LB Bathing Assistance 6  Modified Independent   UB Dressing Assistance 6  Modified independent   LB Dressing Assistance 6  Modified independent   Toileting Assistance  6  Modified independent   Functional Assistance 6  Modified independent   Additional Comments Pt denies concerns regarding ADLs, demonstrates good functional reach and Fair dynamic standing balance   Bed Mobility   Supine to Sit 7  Independent   Sit to Supine 7  Independent   Additional Comments Pt lying supine at end of session with call bell and phone within reach  All needs met and pt reports no further questions for OT at this time   Transfers   Sit to Stand 7  Independent   Stand to Sit 7  Independent   Additional Comments Good safety awareness demonstrated   Functional Mobility   Functional Mobility 5  Supervision   Additional Comments assist for line management;  No LOBs, O2 sats after mobility: 95-96% on RA   Balance   Static Sitting Normal   Dynamic Sitting Good   Static Standing Good   Dynamic Standing Fair +   Ambulatory Fair   Activity Tolerance   Activity Tolerance Patient tolerated treatment well;Patient limited by fatigue   Medical Staff Made Aware Danial, PT   Nurse Made Aware yes; Parth Whitley RN   RUE Assessment   RUE Assessment [facility]   RUE Strength   RUE Overall Strength Within Functional Limits - able to perform ADL tasks with strength  (4/5 throughout)   LUE Assessment   LUE Assessment WFL   LUE Strength   LUE Overall Strength Within Functional Limits - able to perform ADL tasks with strength  (4/5 throughout)   Hand Function   Gross Motor Coordination Functional   Fine Motor Coordination Functional   Sensation   Light Touch No apparent deficits   Proprioception   Proprioception No apparent deficits   Vision-Basic Assessment   Current Vision Wears glasses all the time   Vision - Complex Assessment   Ocular Range of Motion [facility]   Acuity Able to read clock/calendar on wall without difficulty   Perception   Inattention/Neglect Appears intact   Cognition   Overall Cognitive Status [facility]   Arousal/Participation Alert; Cooperative   Attention Within functional limits   Orientation Level Oriented X4   Memory Within functional limits   Following Commands Follows all commands and directions without difficulty   Comments Pt pleasant and cooperative; Engages in convsation appropriately   Assessment   Prognosis Good   Assessment Pt is a 81 y o  male seen for OT evaluation s/p adm to [facility] on 12/26/2019 w/ Exertional chest pain and pressure associated with SOB and s/p syncopal episode at outpatient pulmonary function test  Pt also had stress test on day of admission  CTA demonstrated B/L nonocclusive pulmonary emboli with moderate clot burden  Comorbidities affecting pts functional performance include a significant PMH of anxiety, Back injury, chronic back pain, depression, and hypothyroidism  Pt with active OT orders and activity orders for Activity as tolerated  Pt lives with spouse and dtr in a one level ranch home with 2 LEO  At baseline, pt was I w/ ADLs, IADLs, and functional mobility/transfers w/o use of AD, (+) , and reports 0 falls PTA  Upon evaluation, pt currently functioning at a Mod I level for overall ADLs, Independent for bed mobility, Independent for transfers, and Supervision for functional mobility 2* the following deficits impacting occupational performance: decreased tolerance  These impairments, however do not limit pts ability to safely engage in all baseline areas of occupation, as pt is functioning near baseline level of performance and reports no concerns regarding returning home and completing ADLs  Pt scored overall 85/100 on the Barthel Index  Based on the aforementioned OT evaluation, functional performance deficits, and assessments, pt has been identified as a moderate complexity evaluation   No further acute OT needs identified at this time  Recommend continued mobilization with hospital staff while in the hospital to increase pts endurance and strength upon D/C  From OT standpoint, recommend D/C home with family support when medically cleared  D/C pt from OT caseload at this time      Goals   Patient Goals To go home soon   Plan   OT Frequency Eval only   Recommendation   OT Discharge Recommendation Home with family support   OT - OK to Discharge Yes  (when medically cleared)   Barthel Index   Feeding 10   Bathing 5   Grooming Score 5   Dressing Score 10   Bladder Score 10   Bowels Score 10   Toilet Use Score 10   Transfers (Bed/Chair) Score 15   Mobility (Level Surface) Score 10   Stairs Score 0   Barthel Index Score 85   Modified Melodie Scale   Modified Sterling Scale 2        Valentina Caraballo, OTR/L

## 2019-12-27 NOTE — UTILIZATION REVIEW
Initial Clinical Review    PLEASE NOTE: Patient Upgraded to IP 12/28 @ [id number] from OBS 12/26 @ 1807 due to due to Acute pulmonary emboli with possible right heart strain requiring close monitoring and telemetry, IV heparin drip    Admission: Date/Time/Statement:   12/27/19 1808  Inpatient Admission Once     Transfer Service: General Medicine       Question Answer Comment   Admitting Physician David Wiley    Level of Care Med Surg    Estimated length of stay More than 2 Midnights    Certification I certify that inpatient services are medically necessary for this patient for a duration of greater than two midnights  See H&P and MD Progress Notes for additional information about the patient's course of treatment  12/27/19 1807       ED Arrival Information     Expected Arrival Acuity Means of Arrival Escorted By Service Admission Type    - 12/26/2019 10:18 Urgent Walk-In Self Hospitalist Urgent    Arrival Complaint    medical problem        Chief Complaint   Patient presents with    Syncope     pt seen as outpatient for pulmonary function test and pt states he was told he passed out  pt states he has been having heaviness and sob since beginning of November  pt also had a stress test earlier this am       Assessment/Plan:  51-year-old male, ex-smoker, Vapes marijuana twice a week,  history of hypothyroidism, anxiety/depression;presents to ED w c/o chest pressure, exertional dyspnea, going on since last month, intermittently  tates that yesterday on Christmas he felt really winded, going up and down carrying gifts; patient was scheduled to have stress test and pulmonary function tests today morning; completed 3-1/2 minutes of the stress test; passed out briefly during exhalation part of the pulmonary function test; came to the ER for evaluation    On exam patient with obese built, conscious, alert, vital signs stable, lungs are clear, heart sounds are normal, abdomen soft nontender, no peripheral edema, no calf tenderness swelling    Admitted to OBS with:    Exertional chest pain  Assessment & Plan  Patient reports exertional chest pain, pressure and shortness of breath for the past 2 months  - patient is admitted to [facility] for ACS workup  - patient's exertional symptoms may also be related to pulmonary etiology, the patient does note having been using marijuana via vaporizer in the past 2 months  - will proceed with a V/Q scan and setting of an elevated D-dimer  - cardiology is consulted, input appreciated, low suspicion for cardiac etiology  - an echocardiogram has been ordered, will follow up results     Vasovagal syncope  Assessment & Plan  Patient had an episode of syncope lasted seconds, this occurred during his pulmonary function testing on appears vasovagal; he denies recurrent syncope episodes but does note exertional shortness of breath and chest pressure in the past 2 months  - the patient is admitted under observation to [facility] for ACS workup  - proceed with serial troponin, telemetry monitoring  - due to elevated D-dimer will also obtain a V/Q scan to rule out PE  - PT/OT evaluation prior to discharge     Acute kidney injury [facility])  Assessment & Plan  Acute kidney injury versus CKD stage 3, no prior baseline creatinine available  Proceed with IV fluids 100 mL/hr  Obtain urinalysis with urine sodium and creatinine, measure postvoid residuals  BMP in the morning     Class 2 severe obesity due to excess calories with serious comorbidity and body mass index (BMI) of 37 0 to 37 9 in adult [facility])  Assessment & Plan  Patient would benefit from lifestyle modifications     Anxiety  Assessment & Plan  Continue patient's Xanax  The patient notes that he occasionally uses marijuana through his wife's vaporizer, this may be related to patient's exertional shortness of breath in the past months, the patient is counseled regarding potential harmful side effects from vaping      Acquired hypothyroidism  Assessment & Plan  TSH in normal range  Continue levothyroxine      Anticipated Length of Stay:  Patient will be admitted on an Observation basis with an anticipated length of stay of less than 2 midnights     Justification for Hospital Stay: need for close monitoring, workup       ED Triage Vitals [12/26/19 1026]   Temperature Pulse Respirations Blood Pressure SpO2   97 7 °F (36 5 °C) 93 18 151/91 95 %      Temp Source Heart Rate Source Patient Position - Orthostatic VS BP Location FiO2 (%)   Temporal Monitor Sitting Right arm --      Pain Score       No Pain        Wt Readings from Last 1 Encounters:   12/27/19 127 kg (280 lb 6 8 oz)     Additional Vital Signs:   12/27/19 0733  97 5 °F (36 4 °C)  84  18  119/76  92 %  None (Room air)  Lying   12/26/19 2335  97 7 °F (36 5 °C)  83  18  105/77  92 %  None (Room air)  Lying   12/26/19 1925  97 1 °F (36 2 °C)Abnormal   69  18  115/77  93 %  None (Room air)  Lying   12/26/19 1507  97 9 °F (36 6 °C)  65  18  130/79  93 %  None (Room air)  Lying   12/26/19 1212  98 1 °F (36 7 °C)  78  18  131/92  95 %  None (Room air)  Lying       Pertinent Labs/Diagnostic Test Results:   Results from last 7 days   Lab Units 12/27/19  0547 12/26/19  1331 12/26/19  1042   WBC Thousand/uL 5 88  --  6 85   HEMOGLOBIN g/dL 14 9  --  15 9   HEMATOCRIT % 45 5  --  47 2   PLATELETS Thousands/uL 230 226 245   NEUTROS ABS Thousands/µL 2 56  --  3 28         Results from last 7 days   Lab Units 12/27/19  0547 12/26/19  1042   SODIUM mmol/L 139 139   POTASSIUM mmol/L 4 5 4 1   CHLORIDE mmol/L 105 104   CO2 mmol/L 27 27   ANION GAP mmol/L 7 8   BUN mg/dL 16 16   CREATININE mg/dL 1 21 1 36*   EGFR ml/min/1 73sq m 62 53   CALCIUM mg/dL 9 0 9 4   MAGNESIUM mg/dL 2 3  --      Results from last 7 days   Lab Units 12/26/19  1042   AST U/L 21   ALT U/L 25   ALK PHOS U/L 71   TOTAL PROTEIN g/dL 8 2   ALBUMIN g/dL 4 2   TOTAL BILIRUBIN mg/dL 0 56     Results from last 7 days   Lab Units 12/27/19  0547 12/26/19  1042 GLUCOSE RANDOM mg/dL 117 117       Results from last 7 days   Lab Units 12/26/19  1652 12/26/19  1331 12/26/19  1042   TROPONIN I ng/mL <0 02 <0 02 <0 02     Results from last 7 days   Lab Units 12/26/19  1042   D-DIMER QUANTITATIVE ug/ml FEU 3 52*     Results from last 7 days   Lab Units 12/26/19  1042   PROTIME seconds 13 3   INR  1 00   PTT seconds 25     Results from last 7 days   Lab Units 12/26/19  1042   TSH 3RD GENERATON uIU/mL 2 802     Results from last 7 days   Lab Units 12/26/19  1042   NT-PRO BNP pg/mL 28     Results from last 7 days   Lab Units 12/26/19  1530 12/26/19  1309   CLARITY UA  Clear  --    COLOR UA  Yellow  --    SPEC GRAV UA  1 015  --    PH UA  6 5  --    GLUCOSE UA mg/dl Negative  --    KETONES UA mg/dl Negative  --    BLOOD UA  Trace-Intact*  --    PROTEIN UA mg/dl Negative  --    NITRITE UA  Negative  --    BILIRUBIN UA  Negative  --    UROBILINOGEN UA E U /dl 0 2  --    LEUKOCYTES UA  Negative  --    WBC UA /hpf 0-1*  --    RBC UA /hpf 0-1*  --    BACTERIA UA /hpf None Seen  --    EPITHELIAL CELLS WET PREP /hpf Occasional  --    SODIUM UR   --  165   CREATININE UR mg/dL  --  115 8     12/26 CXR: No acute cardiopulmonary disease    12/26 EKG: NSR  12/27 CTA Chest  Pending      ED Treatment:   Medication Administration from 12/26/2019 1018 to 12/26/2019 1208     None        Past Medical History:   Diagnosis Date    Back injury     Chronic back pain     Depression 9/16/2016    Thyroid disease      Present on Admission:   Anxiety   Acquired hypothyroidism      Admitting Diagnosis: Syncope [R55]  Chest pain [R07 9]  Dyspnea [R06 00]     Age/Sex: 81 y o  male  Admission Orders:  Scheduled Medications:  Medications:  aspirin 81 mg Oral Daily   atorvastatin 40 mg Oral Daily With Dinner   citalopram 20 mg Oral Daily   heparin (porcine) 5,000 Units Subcutaneous Q8H [address]   levothyroxine 150 mcg Oral Early Morning     Continuous IV Infusions:  PRN Meds:  acetaminophen 650 mg Oral Q6H PRN ALPRAZolam 1 mg Oral TID PRN x 2 12/26   ondansetron 4 mg Intravenous Q6H PRN     TELE  SCD's    IP CONSULT TO CARDIOLOGY  IP CONSULT TO CASE MANAGEMENT    Network Utilization Review Department  [email or web address]  ATTENTION: Please call with any questions or concerns to [phone] and carefully listen to the prompts so that you are directed to the right person  All voicemails are confidential   Chris Pronto all requests for admission clinical reviews, approved or denied determinations and any other requests to dedicated fax number below belonging to the campus where the patient is receiving treatment   List of dedicated fax numbers for the Facilities:  [address] DENIALS (Administrative/Medical Necessity) [phone]   [address] (Maternity/NICU/Pediatrics) [phone]   Quan Penny [phone]   Chuckie Min [phone]   Fam Sanchezudder [phone]   Nelson Night [phone]   1205 [facility] 1525 [facility] [phone]   [facility]  [phone]   [address] [facility] [address]  2401 [facility] 1000 [address] [facility] [phone]

## 2019-12-27 NOTE — ASSESSMENT & PLAN NOTE
There are bilateral nonocclusive pulmonary emboli with moderate clot burden  - Patient started on heparin drip  - He has limited coverage for Rx but hopefully would be able to d/c on NOAC - Eliquis  - Telemetry monitoring o/n  - CTA chest suggestive of right heart strain not visualized on echo

## 2019-12-27 NOTE — PLAN OF CARE
Problem: PHYSICAL THERAPY ADULT  Goal: Performs mobility at highest level of function for planned discharge setting  See evaluation for individualized goals  Description  Treatment/Interventions: Elevations, Therapeutic exercise, Functional transfer training, LE strengthening/ROM, Patient/family training, Endurance training, Bed mobility, Gait training, Spoke to nursing, OT  Equipment Recommended: Other (Comment)(none)       See flowsheet documentation for full assessment, interventions and recommendations  Note:   Prognosis: Good  Problem List: Decreased endurance, Impaired balance, Obesity  Assessment: Pt  69 y o male presented after syncopal episode during pulmonary function test  PTA, pt c/o progressively worsening chest pain & SOB hence PCP ordered cardiac stress test & pulmonary function test  Pt unable to tolerate cardiac stress test & passed out during the pulmonary function test  Pt admitted for Exertional chest pain w/ vasovagal syncope, for ACS workup & also noted to have ZURDO  As of 12/27/19, pt also found to have acute PE per CT chest hence now on heparin drip  Pt referred to PT for mobility assessment & D/C planning w/ orders of activity as tolerated  RN cleared pt for PT & OT evals  PTA, pt reports being I w/o AD  Pt lives w/ his wife in ranch style home w/ 2STE  On eval, pt demonstrate minimal dec mobility, balance, endurance & amb  Pt I w/ bed mobility & transfers however require S for amb w/o AD  Gait deviations as above, slow w/ dec foot clearance but no gross LOB noted  (+) SOB after amb but relieved w/ rest  SpO2 95% RA w/ activity  No dizziness reported t/o session  Nsg staff most recent vital signs as follows: /86 (BP Location: Right arm)   Pulse 86   Temp 97 5 °F (36 4 °C) (Tympanic)   Resp 18   Ht 6' 1" (1 854 m)   Wt 127 kg (280 lb 6 8 oz)   SpO2 92%   BMI 37 00 kg/m²   At end of session, pt back in bed w/o issues, call bell & phone in reach   Fall precautions reinforced w/ good understanding  Pt appears to be functioning close to his baseline but will continue skilled PT 1-2x/wk for a follow-up visit & further assess amb & stair mgt for safety  At this time, will anticipate good progress in PT for safe D/C to home  Pt may benefit from OPPT pulmonary rehab at D/C  CM to follow  Nsg staff to continue to mobilized pt(OOB in chair for all meals & ambulate in room/unit) as tolerated to prevent further decline in function  Nsg notified  Barriers to Discharge: None     Recommendation: Outpatient PT(for pulmonary rehab PRN)     PT - OK to Discharge: Yes(when medically cleared)    See flowsheet documentation for full assessment

## 2019-12-27 NOTE — ASSESSMENT & PLAN NOTE
Patient reports exertional chest pain, pressure and shortness of breath for the past 2 months  - patient is admitted to [facility] for ACS workup which was negative  - CTA of the chest was noted for bilateral pulmonary emboli with moderate clot burden and potential right heart strain not demonstrated on 2D echo - this was the likely cause of patient's exertional chest pain  - the patient is initiated on anticoagulation with high-dose heparin

## 2019-12-27 NOTE — PHYSICAL THERAPY NOTE
PT EVALUATION    Pt  Name: Sadie Farias  Pt  Age: 81 y o  MRN: [id number]  LENGTH OF STAY: 0    Patient Active Problem List   Diagnosis    Acquired hypothyroidism    Acute bilateral low back pain with bilateral sciatica    Anxiety    Chronic left shoulder pain    Depression    Erectile dysfunction    Exertional chest pain    Vasovagal syncope    Class 2 severe obesity due to excess calories with serious comorbidity and body mass index (BMI) of 37 0 to 37 9 in adult [facility])    Acute kidney injury ([facility] [address] )       Admitting Diagnoses:   Syncope [R55]  Chest pain [R07 9]  Dyspnea [R06 00]    Past Medical History:   Diagnosis Date    Back injury     Chronic back pain     Depression 9/16/2016    Thyroid disease        Past Surgical History:   Procedure Laterality Date    KNEE SURGERY      TONSILLECTOMY         Imaging Studies:  CTA chest pe study   Final Result by Ezekiel Aldrich MD (12/27 5665)   There are bilateral nonocclusive pulmonary emboli with moderate clot burden         The calculated ratio of right ventricular to left ventricular diameter (RV/LV ratio) is 2 5 This is greater than 0 9, which is abnormal and indicates right heart strain  An abnormal RV/LV ratio has been shown to be associated with an increased risk of 30    day mortality in the setting of acute pulmonary embolism  Urgent consultation with the medical critical care team is recommended  A focal nodule seen in the right lower lung, measuring about 1 4 cm this is is indeterminate may be inflammatory, neoplastic or perfusion related nodule short interval follow-up in 4-6 weeks suggested           I personally discussed this study with Ludwin Leal on 12/27/2019 at 10:41 AM                 Workstation performed: [id number]         XR chest 2 views   Final Result by Cortez Harding MD (12/26 1310)      No acute cardiopulmonary disease              Workstation performed: [id number]              12/27/19 1210   Note Type Note type Eval only   Pain Assessment   Pain Score No Pain   Home Living   Type of [address] One level;Stairs to enter with rails  (2STE)   Home Equipment Other (Comment)  (no DME)   Prior Function   Level of [place] Independent with ADLs and functional mobility  (w/o AD)   Lives With Spouse   Receives Help From Family   ADL Assistance Independent   Falls in the last 6 months 0   Comments (+)    Restrictions/Precautions   Weight Bearing Precautions Per Order No   Other Precautions Telemetry; Fall Risk;Multiple lines   General   Family/Caregiver Present No   Cognition   Overall Cognitive Status WFL   Arousal/Participation Alert   Orientation Level Oriented X4   Following Commands Follows all commands and directions without difficulty   RUE Assessment   RUE Assessment   (refer to OT)   LUE Assessment   LUE Assessment   (refer to OT)   RLE Assessment   RLE Assessment WNL   LLE Assessment   LLE Assessment WNL   Coordination   Movements are Fluid and Coordinated 1   Sensation WFL   Bed Mobility   Supine to Sit 7  Independent   Sit to Supine 7  Independent   Transfers   Sit to Stand 7  Independent   Stand to Sit 7  Independent   Ambulation/Elevation   Gait pattern Decreased foot clearance; Excessively slow   Gait Assistance 5  Supervision   Assistive Device None   Distance 150'   Stair Management Assistance Not tested  (pt currently on heparin drip)   Balance   Static Sitting Normal   Static Standing Good   Ambulatory Fair   Endurance Deficit   Endurance Deficit Yes   Endurance Deficit Description SOB, 95% RA w/ activity   Activity Tolerance   Activity Tolerance Patient tolerated treatment well   Medical Staff Made Aware Marcellus Monroe   Nurse Made Aware CELI Dewitt Sensing   Assessment   Prognosis Good   Problem List Decreased endurance; Impaired balance;Obesity   Assessment Pt  69 y o male presented after syncopal episode during pulmonary function test  PTA, pt c/o progressively worsening chest pain & SOB hence PCP ordered cardiac stress test & pulmonary function test  Pt unable to tolerate cardiac stress test & passed out during the pulmonary function test  Pt admitted for Exertional chest pain w/ vasovagal syncope, for ACS workup & also noted to have ZURDO  As of 12/27/19, pt also found to have acute PE per CT chest hence now on heparin drip  Pt referred to PT for mobility assessment & D/C planning w/ orders of activity as tolerated  RN cleared pt for PT & OT evals  PTA, pt reports being I w/o AD  Pt lives w/ his wife in ranch style home w/ 2STE  On eval, pt demonstrate minimal dec mobility, balance, endurance & amb  Pt I w/ bed mobility & transfers however require S for amb w/o AD  Gait deviations as above, slow w/ dec foot clearance but no gross LOB noted  (+) SOB after amb but relieved w/ rest  SpO2 95% RA w/ activity  No dizziness reported t/o session  Nsg staff most recent vital signs as follows: /86 (BP Location: Right arm)   Pulse 86   Temp 97 5 °F (36 4 °C) (Tympanic)   Resp 18   Ht 6' 1" (1 854 m)   Wt 127 kg (280 lb 6 8 oz)   SpO2 92%   BMI 37 00 kg/m²   At end of session, pt back in bed w/o issues, call bell & phone in reach  Fall precautions reinforced w/ good understanding  Pt appears to be functioning close to his baseline but will continue skilled PT 1-2x/wk for a follow-up visit & further assess amb & stair mgt for safety  At this time, will anticipate good progress in PT for safe D/C to home  Pt may benefit from OPPT pulmonary rehab at D/C  CM to follow  Nsg staff to continue to mobilized pt(OOB in chair for all meals & ambulate in room/unit) as tolerated to prevent further decline in function  Nsg notified      Barriers to Discharge None   Goals   Patient Goals to go home soon   STG Expiration Date 01/03/20   Short Term Goal #1 Goals to be met in 7 days; pt will be able to: 1) inc strength & balance by 1/2 grade to improve overall functional mobility & dec fall risk; 2) inc amb w/ w/o AD approx  >350' w/ I for pt to ambulate community distances w/o any % of the time, to dec caregiver assistance & safely function at home; 3) inc barthel score to 100 to decrease overall risk for falls; 4) negotiate stairs w/ modified I for inc safety during stair mgt inside/outside of home & dec caregiver assistance; 5) pt/caregiver ed   PT Treatment Day 0   Plan   Treatment/Interventions Elevations; Therapeutic exercise; Functional transfer training;LE strengthening/ROM; Patient/family training; Endurance training;Bed mobility;Gait training;Spoke to nursing;OT   PT Frequency 1-2x/wk; Follow-up visit only   Recommendation   Recommendation Outpatient PT  (for pulmonary rehab PRN)   Equipment Recommended Other (Comment)  (none)   PT - OK to Discharge Yes  (when medically cleared)   Barthel Index   Feeding 10   Bathing 5   Grooming Score 5   Dressing Score 10   Bladder Score 10   Bowels Score 10   Toilet Use Score 10   Transfers (Bed/Chair) Score 15   Mobility (Level Surface) Score 10   Stairs Score 0   Barthel Index Score 85   Hx/personal factors: co-morbidities, mutliple lines, telemetry, fall risk and obesity  Examination: dec mobility, dec balance, dec endurance, dec amb, low fall risk  Clinical: unpredictable (ongoing medical status and low fall risk)  Complexity: high     Glover Plane, PT

## 2019-12-27 NOTE — SOCIAL WORK
Pt is currently observation pt  Rec a script to check Eliquis cost  Pt can get the first 30 day free and after the cost is $448 00  Put the 30 day free card in binder with script for pt  Made a to [employer] to follow up with pt help pt get a prescription plan  Met with pt and explained about the cost and referral made to [employer] to assist  Made pt aware the Medicare part D plan closed Dec 7, 2019 and next time for open enrollment is Oct 2020  Spouse will  the free 30 Eliquis at [facility] this evening or tomorrow morning  MD, RN and Pharmacy aware of this  Wrote phone numbers for Miguel Evans and ISAIAH for pt to follow up  Rec a consult for OP resources, but pt stated he had no other concerns at this time  Made referral to  OP CM for assistance to help pt find a Rx plan

## 2019-12-27 NOTE — PROGRESS NOTES
Progress Note - Marcello Mcdowell 1950, 81 y o  male MRN: [id number]    Unit/Bed#: E4 -01 Encounter: [id number]    Primary Care Provider: REMY Weiner   Date and time admitted to hospital: 12/26/2019 10:28 AM        * Exertional chest pain  Assessment & Plan  Patient reports exertional chest pain, pressure and shortness of breath for the past 2 months  - patient is admitted to med surg for ACS workup which was negative  - CTA of the chest was noted for bilateral pulmonary emboli with moderate clot burden and potential right heart strain not demonstrated on 2D echo - this was the likely cause of patient's exertional chest pain  - the patient is initiated on anticoagulation with high-dose heparin    Syncope  Assessment & Plan  Patient had an episode of syncope lasting seconds, this occurred during his pulmonary function testing on appears vasovagal; he denies recurrent syncope episodes but does note exertional shortness of breath and chest pressure in the past 2 months  - troponin remained negative  - CT of the chest revealed bilateral pulmonary emboli with possible right heart strain, 2D echo did not demonstrate right heart strain  - PT/OT evaluation prior to discharge    Bilateral pulmonary embolism (HCC)  Assessment & Plan  There are bilateral nonocclusive pulmonary emboli with moderate clot burden  - Patient started on heparin drip  - He has limited coverage for Rx but hopefully would be able to d/c on NOAC - Eliquis  - Telemetry monitoring o/n  - CTA chest suggestive of right heart strain not visualized on echo    Acute kidney injury [facility])  Assessment & Plan  Acute kidney injury versus CKD stage 3, no prior baseline creatinine available  Creatinine normalized with IV fluids to 1 2  The patient underwent a CT of the chest today, will recheck BMP in the morning    Class 2 severe obesity due to excess calories with serious comorbidity and body mass index (BMI) of 37 0 to 37 9 in adult [facility])  Assessment & Plan  Patient would benefit from lifestyle modifications      VTE Pharmacologic Prophylaxis:   Pharmacologic: Heparin Drip  Mechanical VTE Prophylaxis in Place: Yes    Patient Centered Rounds: I have performed bedside rounds with nursing staff today  Discussions with Specialists or Other Care Team Provider:  Cardiology    Education and Discussions with Family / Patient:  Patient, patient's wife and daughter    Time Spent for Care: 45 minutes  More than 50% of total time spent on counseling and coordination of care as described above  Current Length of Stay: 0 day(s)    Current Patient Status: Inpatient   Certification Statement: The patient, admitted on an observation basis, will now require > 2 midnight hospital stay due to Acute pulmonary emboli with possible right heart strain requiring close monitoring and telemetry, IV heparin drip    Discharge Plan:  Possibly tomorrow    Code Status: Level 1 - Full Code      Subjective:   Patient seen and examined  He reports feeling well but does note that he is mostly sedentary during his hospitalization  He denies shortness of breath at rest   He denies any pain  He denies lower extremity edema  No overnight events  Objective:     Vitals:   Temp (24hrs), Av 7 °F (36 5 °C), Min:97 1 °F (36 2 °C), Max:98 4 °F (36 9 °C)    Temp:  [97 1 °F (36 2 °C)-98 4 °F (36 9 °C)] 98 4 °F (36 9 °C)  HR:  [69-86] 74  Resp:  [18] 18  BP: (105-142)/(76-91) 142/91  SpO2:  [92 %-96 %] 96 %  Body mass index is 37 kg/m²  Input and Output Summary (last 24 hours): Intake/Output Summary (Last 24 hours) at 2019 1809  Last data filed at 2019 0530  Gross per 24 hour   Intake 1150 ml   Output    Net 1150 ml       Physical Exam:     Physical Exam   Constitutional: He is oriented to person, place, and time  No distress  HENT:   Head: Normocephalic and atraumatic  Eyes: Conjunctivae are normal    Neck: No JVD present     Cardiovascular: Normal rate and regular rhythm  No murmur heard  Pulmonary/Chest: Effort normal  No respiratory distress  He has no wheezes  He has no rales  Abdominal: Soft  He exhibits no distension  There is no tenderness  There is no guarding  Musculoskeletal: He exhibits no edema  Neurological: He is alert and oriented to person, place, and time  Skin: Skin is warm and dry  Psychiatric: He has a normal mood and affect  Additional Data:     Labs:    Results from last 7 days   Lab Units 12/27/19  1115 12/27/19  0547   WBC Thousand/uL 5 94 5 88   HEMOGLOBIN g/dL 15 0 14 9   HEMATOCRIT % 44 2 45 5   PLATELETS Thousands/uL 210 230   NEUTROS PCT %  --  44   LYMPHS PCT %  --  41   MONOS PCT %  --  9   EOS PCT %  --  5     Results from last 7 days   Lab Units 12/27/19  0547 12/26/19  1042   SODIUM mmol/L 139 139   POTASSIUM mmol/L 4 5 4 1   CHLORIDE mmol/L 105 104   CO2 mmol/L 27 27   BUN mg/dL 16 16   CREATININE mg/dL 1 21 1 36*   ANION GAP mmol/L 7 8   CALCIUM mg/dL 9 0 9 4   ALBUMIN g/dL  --  4 2   TOTAL BILIRUBIN mg/dL  --  0 56   ALK PHOS U/L  --  71   ALT U/L  --  25   AST U/L  --  21   GLUCOSE RANDOM mg/dL 117 117     Results from last 7 days   Lab Units 12/27/19  1115   INR  0 99         Results from last 7 days   Lab Units 12/27/19  0547   HEMOGLOBIN A1C % 5 9               * I Have Reviewed All Lab Data Listed Above  * Additional Pertinent Lab Tests Reviewed:  Vadim 68 Admission Reviewed    Imaging:    Imaging Reports Reviewed Today Include: CTA chest      Recent Cultures (last 7 days):           Last 24 Hours Medication List:     Current Facility-Administered Medications:  acetaminophen 650 mg Oral Q6H PRN Chrissy Calderón MD    ALPRAZolam 1 mg Oral TID PRN Narciso Zheng MD    aspirin 81 mg Oral Daily Narciso Zheng MD    atorvastatin 40 mg Oral Daily With Esdras Pro MD    citalopram 20 mg Oral Daily Narciso Zheng MD    heparin (porcine) 3-30 Units/kg/hr (Order-Specific) Intravenous Titrated Tracy Cabrera MD Last Rate: 18 Units/kg/hr (12/27/19 1154)   heparin (porcine) 10,000 Units Intravenous PRN Tracy Cabrera MD    heparin (porcine) 5,000 Units Intravenous PRN Tracy Cabrera MD    levothyroxine 150 mcg Oral Early Morning Chrissy Calderón MD    ondansetron 4 mg Intravenous Q6H PRN Tracy Cabrera MD      Facility-Administered Medications Ordered in Other Encounters:  albuterol 2 5 mg Nebulization Once PRN Penni Kussmaul, CRNP        Today, Patient Was Seen By: Ben Walker MD    ** Please Note: Dictation voice to text software may have been used in the creation of this document   **

## 2019-12-28 VITALS
WEIGHT: 278 LBS | HEART RATE: 92 BPM | DIASTOLIC BLOOD PRESSURE: 92 MMHG | BODY MASS INDEX: 36.84 KG/M2 | OXYGEN SATURATION: 96 % | SYSTOLIC BLOOD PRESSURE: 118 MMHG | TEMPERATURE: 97.8 F | HEIGHT: 73 IN | RESPIRATION RATE: 18 BRPM

## 2019-12-28 LAB
ANION GAP SERPL CALCULATED.3IONS-SCNC: 8 MMOL/L (ref 4–13)
APTT PPP: 128 SECONDS (ref 23–37)
APTT PPP: >210 SECONDS (ref 23–37)
BASOPHILS # BLD AUTO: 0.04 THOUSANDS/ΜL (ref 0–0.1)
BASOPHILS NFR BLD AUTO: 1 % (ref 0–1)
BUN SERPL-MCNC: 14 MG/DL (ref 5–25)
CALCIUM SERPL-MCNC: 9 MG/DL (ref 8.3–10.1)
CHLORIDE SERPL-SCNC: 105 MMOL/L (ref 100–108)
CO2 SERPL-SCNC: 25 MMOL/L (ref 21–32)
CREAT SERPL-MCNC: 1.28 MG/DL (ref 0.6–1.3)
EOSINOPHIL # BLD AUTO: 0.29 THOUSAND/ΜL (ref 0–0.61)
EOSINOPHIL NFR BLD AUTO: 5 % (ref 0–6)
ERYTHROCYTE [DISTWIDTH] IN BLOOD BY AUTOMATED COUNT: 12.1 % (ref 11.6–15.1)
GFR SERPL CREATININE-BSD FRML MDRD: 58 ML/MIN/1.73SQ M
GLUCOSE SERPL-MCNC: 123 MG/DL (ref 65–140)
HCT VFR BLD AUTO: 43 % (ref 36.5–49.3)
HGB BLD-MCNC: 14.1 G/DL (ref 12–17)
IMM GRANULOCYTES # BLD AUTO: 0.02 THOUSAND/UL (ref 0–0.2)
IMM GRANULOCYTES NFR BLD AUTO: 0 % (ref 0–2)
LYMPHOCYTES # BLD AUTO: 2.75 THOUSANDS/ΜL (ref 0.6–4.47)
LYMPHOCYTES NFR BLD AUTO: 45 % (ref 14–44)
MCH RBC QN AUTO: 31.1 PG (ref 26.8–34.3)
MCHC RBC AUTO-ENTMCNC: 32.8 G/DL (ref 31.4–37.4)
MCV RBC AUTO: 95 FL (ref 82–98)
MONOCYTES # BLD AUTO: 0.59 THOUSAND/ΜL (ref 0.17–1.22)
MONOCYTES NFR BLD AUTO: 10 % (ref 4–12)
NEUTROPHILS # BLD AUTO: 2.38 THOUSANDS/ΜL (ref 1.85–7.62)
NEUTS SEG NFR BLD AUTO: 39 % (ref 43–75)
NRBC BLD AUTO-RTO: 0 /100 WBCS
PLATELET # BLD AUTO: 207 THOUSANDS/UL (ref 149–390)
PMV BLD AUTO: 8.9 FL (ref 8.9–12.7)
POTASSIUM SERPL-SCNC: 4.3 MMOL/L (ref 3.5–5.3)
RBC # BLD AUTO: 4.53 MILLION/UL (ref 3.88–5.62)
SODIUM SERPL-SCNC: 138 MMOL/L (ref 136–145)
WBC # BLD AUTO: 6.07 THOUSAND/UL (ref 4.31–10.16)

## 2019-12-28 PROCEDURE — 85730 THROMBOPLASTIN TIME PARTIAL: CPT | Performed by: FAMILY MEDICINE

## 2019-12-28 PROCEDURE — 99239 HOSP IP/OBS DSCHRG MGMT >30: CPT | Performed by: FAMILY MEDICINE

## 2019-12-28 PROCEDURE — 80048 BASIC METABOLIC PNL TOTAL CA: CPT | Performed by: FAMILY MEDICINE

## 2019-12-28 PROCEDURE — 85025 COMPLETE CBC W/AUTO DIFF WBC: CPT | Performed by: FAMILY MEDICINE

## 2019-12-28 RX ADMIN — CITALOPRAM HYDROBROMIDE 20 MG: 20 TABLET ORAL at 10:06

## 2019-12-28 RX ADMIN — HEPARIN SODIUM AND DEXTROSE 15 UNITS/KG/HR: 10000; 5 INJECTION INTRAVENOUS at 00:31

## 2019-12-28 RX ADMIN — LEVOTHYROXINE SODIUM 150 MCG: 75 TABLET ORAL at 05:44

## 2019-12-28 RX ADMIN — APIXABAN 10 MG: 5 TABLET, FILM COATED ORAL at 10:06

## 2019-12-28 RX ADMIN — ASPIRIN 81 MG: 81 TABLET, COATED ORAL at 10:06

## 2019-12-28 NOTE — PLAN OF CARE
Problem: PAIN - ADULT  Goal: Verbalizes/displays adequate comfort level or baseline comfort level  Description  Interventions:  - Encourage patient to monitor pain and request assistance  - Assess pain using appropriate pain scale  - Administer analgesics based on type and severity of pain and evaluate response  - Implement non-pharmacological measures as appropriate and evaluate response  - Consider cultural and social influences on pain and pain management  - Notify physician/advanced practitioner if interventions unsuccessful or patient reports new pain  Outcome: Progressing     Problem: DISCHARGE PLANNING  Goal: Discharge to home or other facility with appropriate resources  Description  INTERVENTIONS:  - Identify barriers to discharge w/patient and caregiver  - Arrange for needed discharge resources and transportation as appropriate  - Identify discharge learning needs (meds, wound care, etc )  - Arrange for interpretive services to assist at discharge as needed  - Refer to Case Management Department for coordinating discharge planning if the patient needs post-hospital services based on physician/advanced practitioner order or complex needs related to functional status, cognitive ability, or social support system  Outcome: Progressing     Problem: Knowledge Deficit  Goal: Patient/family/caregiver demonstrates understanding of disease process, treatment plan, medications, and discharge instructions  Description  Complete learning assessment and assess knowledge base    Interventions:  - Provide teaching at level of understanding  - Provide teaching via preferred learning methods  Outcome: Progressing     Problem: CARDIOVASCULAR - ADULT  Goal: Maintains optimal cardiac output and hemodynamic stability  Description  INTERVENTIONS:  - Monitor I/O, vital signs and rhythm  - Monitor for S/S and trends of decreased cardiac output  - Administer and titrate ordered vasoactive medications to optimize hemodynamic stability  - Assess quality of pulses, skin color and temperature  - Assess for signs of decreased coronary artery perfusion  - Instruct patient to report change in severity of symptoms  Outcome: Progressing  Goal: Absence of cardiac dysrhythmias or at baseline rhythm  Description  INTERVENTIONS:  - Continuous cardiac monitoring, vital signs, obtain 12 lead EKG if ordered  - Administer antiarrhythmic and heart rate control medications as ordered  - Monitor electrolytes and administer replacement therapy as ordered  Outcome: Progressing     Problem: Potential for Falls  Goal: Patient will remain free of falls  Description  INTERVENTIONS:  - Assess patient frequently for physical needs  -  Identify cognitive and physical deficits and behaviors that affect risk of falls    -  [place] fall precautions as indicated by assessment   - Educate patient/family on patient safety including physical limitations  - Instruct patient to call for assistance with activity based on assessment  - Modify environment to reduce risk of injury  - Consider OT/PT consult to assist with strengthening/mobility  Outcome: Progressing

## 2019-12-28 NOTE — PLAN OF CARE
Problem: PAIN - ADULT  Goal: Verbalizes/displays adequate comfort level or baseline comfort level  Description  Interventions:  - Encourage patient to monitor pain and request assistance  - Assess pain using appropriate pain scale  - Administer analgesics based on type and severity of pain and evaluate response  - Implement non-pharmacological measures as appropriate and evaluate response  - Consider cultural and social influences on pain and pain management  - Notify physician/advanced practitioner if interventions unsuccessful or patient reports new pain  Outcome: Progressing     Problem: DISCHARGE PLANNING  Goal: Discharge to home or other facility with appropriate resources  Description  INTERVENTIONS:  - Identify barriers to discharge w/patient and caregiver  - Arrange for needed discharge resources and transportation as appropriate  - Identify discharge learning needs (meds, wound care, etc )  - Arrange for interpretive services to assist at discharge as needed  - Refer to Case Management Department for coordinating discharge planning if the patient needs post-hospital services based on physician/advanced practitioner order or complex needs related to functional status, cognitive ability, or social support system  Outcome: Progressing     Problem: Knowledge Deficit  Goal: Patient/family/caregiver demonstrates understanding of disease process, treatment plan, medications, and discharge instructions  Description  Complete learning assessment and assess knowledge base    Interventions:  - Provide teaching at level of understanding  - Provide teaching via preferred learning methods  Outcome: Progressing     Problem: CARDIOVASCULAR - ADULT  Goal: Maintains optimal cardiac output and hemodynamic stability  Description  INTERVENTIONS:  - Monitor I/O, vital signs and rhythm  - Monitor for S/S and trends of decreased cardiac output  - Administer and titrate ordered vasoactive medications to optimize hemodynamic stability  - Assess quality of pulses, skin color and temperature  - Assess for signs of decreased coronary artery perfusion  - Instruct patient to report change in severity of symptoms  Outcome: Progressing  Goal: Absence of cardiac dysrhythmias or at baseline rhythm  Description  INTERVENTIONS:  - Continuous cardiac monitoring, vital signs, obtain 12 lead EKG if ordered  - Administer antiarrhythmic and heart rate control medications as ordered  - Monitor electrolytes and administer replacement therapy as ordered  Outcome: Progressing     Problem: Potential for Falls  Goal: Patient will remain free of falls  Description  INTERVENTIONS:  - Assess patient frequently for physical needs  -  Identify cognitive and physical deficits and behaviors that affect risk of falls    -  [place] fall precautions as indicated by assessment   - Educate patient/family on patient safety including physical limitations  - Instruct patient to call for assistance with activity based on assessment  - Modify environment to reduce risk of injury  - Consider OT/PT consult to assist with strengthening/mobility  Outcome: Progressing

## 2019-12-28 NOTE — DISCHARGE SUMMARY
Discharge- Josh Stable 1950, 81 y o  male MRN: [id number]    Unit/Bed#: E4 -01 Encounter: [id number]    Primary Care Provider: REMY Mueller   Date and time admitted to hospital: 12/26/2019 10:28 AM        * Exertional chest pain  Assessment & Plan  Patient reports exertional chest pain, pressure and shortness of breath for the past 2 months  - patient is admitted to med surg for ACS workup which was negative  - CTA of the chest was noted for bilateral pulmonary emboli with moderate clot burden and potential right heart strain not demonstrated on 2D echo - this was the likely cause of patient's exertional chest pain  - the patient was initiated on anticoagulation with high-dose heparin - now transitioned to Eliquis - the patient is working payment for the medications, he will get first 30 days for free  Also discussed option with warfarin but the patient prefers Eliquis at this time     Syncope  Assessment & Plan  Patient had an episode of syncope lasting seconds, this occurred during his pulmonary function testing on appears vasovagal; he denies recurrent syncope episodes but does note exertional shortness of breath and chest pressure in the past 2 months  - troponin remained negative  - CT of the chest revealed bilateral pulmonary emboli with possible right heart strain, 2D echo did not demonstrate right heart strain      Bilateral pulmonary embolism (HCC)  Assessment & Plan  There are bilateral nonocclusive pulmonary emboli with moderate clot burden  - Patient was started on heparin drip  - He has limited coverage for Rx but electing to be discharged on Eliquis and will look into financial side independently upon discharge  His 1st month will be covered with a coupon    - Telemetry monitoring without events  - CTA chest suggestive of right heart strain not visualized on echo  - patient remains hemodynamically stable and is safe for discharge    Acute kidney injury [facility])  Assessment & Plan  Acute kidney injury versus CKD stage 3, no prior baseline creatinine available  Creatinine normalized with IV fluids to 1 2  The patient underwent a CT of the chest today, will recheck BMP in the morning    Class 2 severe obesity due to excess calories with serious comorbidity and body mass index (BMI) of 37 0 to 37 9 in adult [facility])  Assessment & Plan  Patient would benefit from lifestyle modifications    Anxiety  Assessment & Plan  Continue Xanax      Acquired hypothyroidism  Assessment & Plan  TSH in normal range  Continue levothyroxine      Discharging Physician / Practitioner: Armando Babinski, MD  PCP: Rich Kitchen [facility]   Admission Date:   Admission Orders (From admission, onward)     Ordered        12/27/19 1807  Inpatient Admission  Once         12/26/19 1144  Place in Observation  Once         12/26/19 1145  Place in Observation  Once                   Discharge Date: 12/28/19    Resolved Problems  Date Reviewed: 12/28/2019    None          Consultations During Hospital Stay:  · Cardiology    Procedures Performed:   CTA chest pe study   Final Result by Krish Daniels MD (12/27 1045)   There are bilateral nonocclusive pulmonary emboli with moderate clot burden         The calculated ratio of right ventricular to left ventricular diameter (RV/LV ratio) is 2 5 This is greater than 0 9, which is abnormal and indicates right heart strain  An abnormal RV/LV ratio has been shown to be associated with an increased risk of 30    day mortality in the setting of acute pulmonary embolism  Urgent consultation with the medical critical care team is recommended        A focal nodule seen in the right lower lung, measuring about 1 4 cm this is is indeterminate may be inflammatory, neoplastic or perfusion related nodule short interval follow-up in 4-6 weeks suggested           I personally discussed this study with Liza Whiting on 12/27/2019 at 10:41 AM                 Workstation performed: [id number]         XR chest 2 views   Final Result by Neil Girard MD ( 1940)      No acute cardiopulmonary disease  Workstation performed: [id number]              Transthoracic Echocardiogram  2D, M-mode, Doppler, and Color Doppler     Study date:  27-Dec-2019     Patient: Sagar Rob  MR number: [id number]  Account number: [de-identified]  : 1950  Age: 81 years  Gender: Male  Status: Outpatient  Location: Bedside  Height: 73 in  Weight: 281 lb  BP: 119/ 76 mmHg     Indications: Syncope      Diagnoses: R55  - Syncope and collapse     Sonographer:  Jose Kimball RDCS  Primary Physician:  REMY Blackwell  Referring Physician:  Carlo Conti DO  Group:  [facility]  Interpreting Physician:  Carlo Conti DO     SUMMARY     PROCEDURE INFORMATION:  This was a technically difficult study  Echocardiographic views were limited due to poor acoustic window availability, decreased penetration, and lung interference      LEFT VENTRICLE:  Systolic function was hyperdynamic  Ejection fraction was estimated to be 75 %  There were no regional wall motion abnormalities  Doppler parameters were consistent with abnormal left ventricular relaxation (grade 1 diastolic dysfunction)      RIGHT VENTRICLE:  Not well visualized  The ventricle was probably mildly dilated      PULMONIC VALVE:  There was trace regurgitation      SUMMARY MEASUREMENTS  PW measurements:  Unspecified Anatomy:   E' Sept was 0 1 m/s  E/E' Sept was 4 3   MV A Feliberto was 0 6 m/s  MV Dec Dubois was 2 6 m/s2  MV DecT was 210 4 ms   MV E Feliberto was 0 4 m/s  MV E/A Ratio was 0 6       HISTORY: PRIOR HISTORY: Hypothyroidism, acute kidney injury, anxiety, chest pain on exertion, vasovagal syncope, obesity (class 2), depression, former smoker      PROCEDURE: The procedure was performed at the bedside  This was a routine study  The transthoracic approach was used   The study included complete 2D imaging, M-mode, complete spectral Doppler, and color Doppler  The heart rate was 86 bpm,  at the start of the study  Images were obtained from the parasternal, apical, and suprasternal notch acoustic windows  Images were not obtained from the subcostal acoustic windows  Intravenous contrast ( 0 6 ml Definity in nss) was  administered  Echocardiographic views were limited due to poor acoustic window availability, decreased penetration, and lung interference  This was a technically difficult study      LEFT VENTRICLE: Size was normal  Systolic function was hyperdynamic  Ejection fraction was estimated to be 75 %  There were no regional wall motion abnormalities  Wall thickness was normal  DOPPLER: Doppler parameters were consistent with  abnormal left ventricular relaxation (grade 1 diastolic dysfunction)      RIGHT VENTRICLE: Not well visualized The ventricle was probably mildly dilated  Systolic function was normal      LEFT ATRIUM: Size was normal      RIGHT ATRIUM: Size was normal      MITRAL VALVE: Valve structure was normal  There was normal leaflet separation  DOPPLER: The transmitral velocity was within the normal range  There was no evidence for stenosis  There was no regurgitation      AORTIC VALVE: The valve was trileaflet  Leaflets exhibited normal thickness and normal cuspal separation  DOPPLER: Transaortic velocity was within the normal range  There was no evidence for stenosis  There was no regurgitation      TRICUSPID VALVE: The valve structure was normal  There was normal leaflet separation  DOPPLER: The transtricuspid velocity was within the normal range  There was no evidence for stenosis  There was no regurgitation      PULMONIC VALVE: Leaflets exhibited normal thickness, no calcification, and normal cuspal separation  DOPPLER: The transpulmonic velocity was within the normal range  There was trace regurgitation      PERICARDIUM: There was no pericardial effusion   The pericardium was normal in appearance      AORTA: The root exhibited normal size      SYSTEM MEASUREMENT TABLES     PW  E' Sept: 0 1 m/s  E/E' Sept: 4 3  MV A Feliberto: 0 6 m/s  MV A Feliberto: 0 2 m/s  MV Dec Loving: 2 6 m/s2  MV DecT: 210 4 ms  MV E Feliberto: 0 4 m/s  MV E Feliberto: 0 6 m/s  MV E/A Ratio: 0 6     [facility]     Prepared and electronically signed by  Emeka Reza DO  Signed 27-Dec-2019 13:59:54       Significant Findings / Test Results:   Results from last 7 days   Lab Units 12/28/19  0525   WBC Thousand/uL 6 07   HEMOGLOBIN g/dL 14 1   HEMATOCRIT % 43 0   PLATELETS Thousands/uL 207     Results from last 7 days   Lab Units 12/28/19  0525   SODIUM mmol/L 138   POTASSIUM mmol/L 4 3   CHLORIDE mmol/L 105   CO2 mmol/L 25   BUN mg/dL 14   CREATININE mg/dL 1 28   CALCIUM mg/dL 9 0     Incidental Findings:   · None     Test Results Pending at Discharge (will require follow up): · None      Outpatient Tests Requested:  · None    Complications:  None    Reason for Admission: exertional SOB    Hospital Course:     Gerrit Romberg is a 81 y o  male patient who originally presented to the hospital on 12/26/2019 due to exertional shortness of breath and chest pressure and occasional pain  Patient was admitted for ACS workup which was negative  The patient's D-dimer was checked and elevated, a CTA of the chest was subsequently obtained and noted for bilateral pulmonary emboli with moderate clot burden and suspicion for right heart strain  Fortunately right heart strain was not demonstrated on the 2D echo  The patient remained hemodynamically stable and was deemed safe for discharge  He was discharged on Eliquis for anticoagulation  He is to follow up with his primary care provider and is also referred to Hematology for evaluation of his pulmonary embolism treatment  All questions were answered  Wife at bedside  Please see above list of diagnoses and related plan for additional information       Condition at Discharge: good Discharge Day Visit / Exam:     Subjective:  Patient seen and examined  He reports feeling well and states that he would like to go home  He denies shortness of breath or chest pain  He denies nausea, vomiting, diarrhea constipation reports good appetite  Vitals: Blood Pressure: 118/92 (12/28/19 0736)  Pulse: 92 (12/28/19 0736)  Temperature: 97 8 °F (36 6 °C) (12/28/19 0736)  Temp Source: Tympanic (12/28/19 0736)  Respirations: 18 (12/28/19 0736)  Height: 6' 1" (185 4 cm) (12/26/19 1212)  Weight - Scale: 126 kg (278 lb) (12/28/19 0557)  SpO2: 96 % (12/28/19 0736)    Exam:     Physical Exam   Constitutional: He is oriented to person, place, and time  No distress  HENT:   Head: Normocephalic and atraumatic  Eyes: Conjunctivae are normal    Neck: No JVD present  Cardiovascular: Normal rate and regular rhythm  No murmur heard  Pulmonary/Chest: Effort normal  No respiratory distress  He has no wheezes  He has no rales  Abdominal: Soft  He exhibits no distension  There is no tenderness  There is no guarding  Musculoskeletal: He exhibits no edema  Neurological: He is alert and oriented to person, place, and time  Skin: Skin is warm and dry  Psychiatric: He has a normal mood and affect  Discussion with Family: Yes, wife at bedside    Discharge instructions/Information to patient and family:   See after visit summary for information provided to patient and family  Provisions for Follow-Up Care:  See after visit summary for information related to follow-up care and any pertinent home health orders  Disposition:     Home    For Discharges to [facility] SNF:   · Not Applicable to this Patient - Not Applicable to this Patient    Planned Readmission: No     Discharge Statement:  I spent 35 minutes discharging the patient  This time was spent on the day of discharge  I had direct contact with the patient on the day of discharge   Greater than 50% of the total time was spent examining patient, answering all patient questions, arranging and discussing plan of care with patient as well as directly providing post-discharge instructions  Additional time then spent on discharge activities  Discharge Medications:  See after visit summary for reconciled discharge medications provided to patient and family        ** Please Note: This note has been constructed using a voice recognition system **

## 2019-12-28 NOTE — ASSESSMENT & PLAN NOTE
Patient had an episode of syncope lasting seconds, this occurred during his pulmonary function testing on appears vasovagal; he denies recurrent syncope episodes but does note exertional shortness of breath and chest pressure in the past 2 months  - troponin remained negative  - CT of the chest revealed bilateral pulmonary emboli with possible right heart strain, 2D echo did not demonstrate right heart strain

## 2019-12-28 NOTE — ASSESSMENT & PLAN NOTE
Patient reports exertional chest pain, pressure and shortness of breath for the past 2 months  - patient is admitted to [facility] for ACS workup which was negative  - CTA of the chest was noted for bilateral pulmonary emboli with moderate clot burden and potential right heart strain not demonstrated on 2D echo - this was the likely cause of patient's exertional chest pain  - the patient was initiated on anticoagulation with high-dose heparin - now transitioned to Eliquis - the patient is working payment for the medications, he will get first 30 days for free   Also discussed option with warfarin but the patient prefers Eliquis at this time

## 2019-12-28 NOTE — SOCIAL WORK
CM was asked to provide pt with a PACE application; pt was unavailable, CM met with pt's wife and provided to patient and gave her the phone number for [facility] for follow up

## 2019-12-28 NOTE — ASSESSMENT & PLAN NOTE
There are bilateral nonocclusive pulmonary emboli with moderate clot burden  - Patient was started on heparin drip  - He has limited coverage for Rx but electing to be discharged on Eliquis and will look into financial side independently upon discharge  His 1st month will be covered with a coupon    - Telemetry monitoring without events  - CTA chest suggestive of right heart strain not visualized on echo  - patient remains hemodynamically stable and is safe for discharge

## 2019-12-30 ENCOUNTER — TRANSITIONAL CARE MANAGEMENT (OUTPATIENT)
Dept: FAMILY MEDICINE CLINIC | Facility: CLINIC | Age: 69
End: 2019-12-30

## 2019-12-30 DIAGNOSIS — Z71.89 COMPLEX CARE COORDINATION: Primary | ICD-10-CM

## 2019-12-31 ENCOUNTER — TELEPHONE (OUTPATIENT)
Dept: HEMATOLOGY ONCOLOGY | Facility: CLINIC | Age: 69
End: 2019-12-31

## 2019-12-31 DIAGNOSIS — Z71.89 COMPLEX CARE COORDINATION: Primary | ICD-10-CM

## 2019-12-31 NOTE — TELEPHONE ENCOUNTER
New Patient Encounter    New Patient Intake Form   Patient Details:  Nic Garcia  1950  [id number]    Background Information:  [address] starts by opening a telephone encounter and gathering the following information   Who is calling to schedule? If not self, relationship to patient? self   Referring Provider Radha Lujan   What is the diagnosis? PE   Is there any prior history of Cancer? No   If yes, please explain    When was the diagnosis? 12/2019   Is patient aware of diagnosis? Yes   Reason for visit? NP DX   Have you had any testing done? If so: when, where? Yes   Are records in [employer]? yes   Was the patient told to bring a disk? no   Scheduling Information:   Preferred [place]:  [place]     Requesting Specific Provider? no   Are there any dates/time the patient cannot be seen? no      Miscellaneous:    After completing the above information, please route to Financial Counselor and the appropriate Nurse Navigator for review

## 2020-01-02 NOTE — PHYSICIAN ADVISOR
Current patient class: Inpatient  The patient is currently on Hospital Day: 3 at [facility] [facility]      The patient was admitted to the hospital at  6:07 PM on 12/27/19 for the following diagnosis:  Syncope [R55]  Chest pain [R07 9]  Dyspnea [R06 00]       There is documentation in the medical record of an expected length of stay of at least 2 midnights  The patient is therefore expected to satisfy the 2 midnight benchmark and given the 2 midnight presumption is appropriate for INPATIENT ADMISSION  Given this expectation of a satisfying stay, CMS instructs us that the patient is most often appropriate for inpatient admission under part A provided medical necessity is documented in the chart  After review of the relevant documentation, labs, vital signs and test results, the patient is appropriate for INPATIENT ADMISSION  Admission to the hospital as an inpatient is a complex decision making process which requires the practitioner to consider the patients presenting complaint, history and physical examination and all relevant testing  With this in mind, in this case, the patient was deemed appropriate for INPATIENT ADMISSION  After review of the documentation and testing available at the time of the admission I concur with this clinical determination of medical necessity  Rationale is as follows: The patient is a 81 yrs old Male who presented to the ED at 12/26/2019 10:28 AM with a chief complaint of Syncope (pt seen as outpatient for pulmonary function test and pt states he was told he passed out  pt states he has been having heaviness and sob since beginning of November  pt also had a stress test earlier this am  )     The patient was admitted with exertional chest pain, vasovagal syncope, ZURDO  The plan of care includes telemetry monitoring, echo, cardiology consultation, serial cardiac enzymes, IV fluids, repeat labs   This patient is appropriate for INPATIENT admission; continued hospitalization is necessary to ensure stabilization of his clinical status  The patients vitals on arrival were ED Triage Vitals [12/26/19 1026]   Temperature Pulse Respirations Blood Pressure SpO2   97 7 °F (36 5 °C) 93 18 151/91 95 %      Temp Source Heart Rate Source Patient Position - Orthostatic VS BP Location FiO2 (%)   Temporal Monitor Sitting Right arm --      Pain Score       No Pain           Past Medical History:   Diagnosis Date    Back injury     Chronic back pain     Depression 9/16/2016    Thyroid disease      Past Surgical History:   Procedure Laterality Date    KNEE SURGERY      TONSILLECTOMY             Consults have been placed to:   IP CONSULT TO CARDIOLOGY  IP CONSULT TO CASE MANAGEMENT    Vitals:    12/27/19 1950 12/27/19 2329 12/28/19 0557 12/28/19 0736   BP: 118/72 114/66  118/92   BP Location: Right arm Right arm  Right arm   Pulse: 81 90  92   Resp: 18 19  18   Temp: 97 9 °F (36 6 °C) 97 7 °F (36 5 °C)  97 8 °F (36 6 °C)   TempSrc: Temporal Temporal  Tympanic   SpO2: 94% 90%  96%   Weight:   126 kg (278 lb)    Height:           Most recent labs:    No results for input(s): WBC, HGB, HCT, PLT, K, NA, CALCIUM, BUN, CREATININE, LIPASE, AMYLASE, INR, TROPONINI, CKTOTAL, AST, ALT, ALKPHOS, BILITOT in the last 72 hours  Scheduled Meds:  Continuous Infusions:  No current facility-administered medications for this encounter  PRN Meds:      Surgical procedures (if appropriate):

## 2020-01-03 ENCOUNTER — PATIENT OUTREACH (OUTPATIENT)
Dept: CASE MANAGEMENT | Facility: OTHER | Age: 70
End: 2020-01-03

## 2020-01-03 NOTE — PROGRESS NOTES
OPCM DUGLAS reached out to patient after referral from recent hospitalization  Patient was now put on Elliquis and is having difficulties affording the medication  Patient was provided with Dotty Hamman / Leatha Loyd however he did not qualify because he does not have a Medicare Part D prescription plan  Will send patient an application for assistance through [employer]  Patient should qualify since he does not have a prescription plan  Application was sent to patient's home address along with my card, patient was advised to call if he needed assistance in completing application

## 2020-01-06 ENCOUNTER — OFFICE VISIT (OUTPATIENT)
Dept: FAMILY MEDICINE CLINIC | Facility: CLINIC | Age: 70
End: 2020-01-06
Payer: MEDICARE

## 2020-01-06 VITALS
OXYGEN SATURATION: 97 % | TEMPERATURE: 98.9 F | HEIGHT: 73 IN | SYSTOLIC BLOOD PRESSURE: 128 MMHG | WEIGHT: 287 LBS | HEART RATE: 77 BPM | BODY MASS INDEX: 38.04 KG/M2 | DIASTOLIC BLOOD PRESSURE: 90 MMHG

## 2020-01-06 DIAGNOSIS — I26.99 BILATERAL PULMONARY EMBOLISM (HCC): Primary | ICD-10-CM

## 2020-01-06 DIAGNOSIS — N17.9 ACUTE KIDNEY INJURY (HCC): ICD-10-CM

## 2020-01-06 DIAGNOSIS — R55 SYNCOPE, UNSPECIFIED SYNCOPE TYPE: ICD-10-CM

## 2020-01-06 DIAGNOSIS — E66.01 CLASS 2 SEVERE OBESITY DUE TO EXCESS CALORIES WITH SERIOUS COMORBIDITY AND BODY MASS INDEX (BMI) OF 37.0 TO 37.9 IN ADULT (HCC): ICD-10-CM

## 2020-01-06 DIAGNOSIS — E03.9 ACQUIRED HYPOTHYROIDISM: ICD-10-CM

## 2020-01-06 PROBLEM — R07.9 EXERTIONAL CHEST PAIN: Status: RESOLVED | Noted: 2019-12-26 | Resolved: 2020-01-06

## 2020-01-06 PROCEDURE — 99496 TRANSJ CARE MGMT HIGH F2F 7D: CPT | Performed by: NURSE PRACTITIONER

## 2020-01-06 NOTE — ASSESSMENT & PLAN NOTE
Patient has hematology appointment 1/23/2020  He continues with eliquis, patient given 3 printed prescriptions for this

## 2020-01-06 NOTE — PROGRESS NOTES
Assessment and Plan:    Problem List Items Addressed This Visit        Endocrine    Acquired hypothyroidism     Patient TSH is within normal limits  Continue with levothyroxine 150mcg  Cardiovascular and Mediastinum    Syncope    Bilateral pulmonary embolism [facility]) - Primary     Patient has hematology appointment 1/23/2020  He continues with eliquis, patient given 3 printed prescriptions for this  Relevant Medications    apixaban (ELIQUIS) 5 mg (Start on 3/6/2020)    apixaban (ELIQUIS) 5 mg    apixaban (ELIQUIS) 5 mg (Start on 5/6/2020)       Genitourinary    Acute kidney injury ([facility] [address] )     Patient BMP for acute kidney injury has resolved  Other    Class 2 severe obesity due to excess calories with serious comorbidity and body mass index (BMI) of 37 0 to 37 9 in [facility])                 Diagnoses and all orders for this visit:    Bilateral pulmonary embolism (HCC)  -     apixaban (ELIQUIS) 5 mg; Take 1 tablet (5 mg total) by mouth 2 (two) times a day  -     apixaban (ELIQUIS) 5 mg; Take 1 tablet (5 mg total) by mouth 2 (two) times a day  -     apixaban (ELIQUIS) 5 mg; Take 1 tablet (5 mg total) by mouth 2 (two) times a day    Acute kidney injury ([facility] [address] )    Acquired hypothyroidism    Syncope, unspecified syncope type    Class 2 severe obesity due to excess calories with serious comorbidity and body mass index (BMI) of 37 0 to 37 9 in [facility])              Subjective:      Patient ID: George Valenzuela is a 81 y o  male  CC:    Chief Complaint   Patient presents with    Transition of Care Management     patient in office for follow up from hospital d/c 12/28/19  HPI:    Patient presented to ED from having his PFTs and stress test  He collapsed during these procedures  He was transferred to the ED then subsequently admitted for ACS workup which was negative  Patient has elevated D-dimer which promoted CTA chest which revealed bilateral PE with right heart strain   He then had an echo which was normal  He was started on IV heparin then transitioned to PO eliquis  Patient also had acute kidney injury which he was given IV fluids and his kidney function normalized  He states he was denied by [employer], [employer] or aging  He is asking for a printed rx  Patient states he started feeling leg cramps while in [place] was driving  He drives this distance about 3-4 times per year  He is currently looking to buy a home here in [place]  The following portions of the patient's history were reviewed and updated as appropriate: allergies, current medications, past family history, past medical history, past social history, past surgical history and problem list       Review of Systems   Constitutional: Positive for activity change (some exertional fatigue )  Negative for diaphoresis and fever  Eyes: Negative for visual disturbance  Respiratory: Negative for chest tightness, shortness of breath and wheezing  These symptoms resolved    Cardiovascular: Negative for chest pain, palpitations and leg swelling  Neurological: Negative for dizziness, light-headedness and headaches  Psychiatric/Behavioral: Negative for agitation and suicidal ideas  The patient is not nervous/anxious  Data to review:       Objective:    Vitals:    01/06/20 1512   BP: 128/90   BP Location: Right arm   Patient Position: Sitting   Cuff Size: Large   Pulse: 77   Temp: 98 9 °F (37 2 °C)   TempSrc: Tympanic   SpO2: 97%   Weight: 130 kg (287 lb)   Height: 6' 1" (1 854 m)        Physical Exam   Constitutional: He is oriented to person, place, and time  Vital signs are normal  He appears well-developed and well-nourished  He does not have a sickly appearance  He does not appear ill  HENT:   Head: Normocephalic and atraumatic  Mouth/Throat: Uvula is midline, oropharynx is clear and moist and mucous membranes are normal    Eyes: Pupils are equal, round, and reactive to light  Neck: No JVD present  Carotid bruit is not present  No thyromegaly present  Cardiovascular: Normal rate, regular rhythm, S1 normal, S2 normal and normal heart sounds  No murmur heard  No lower extremity edema    Pulmonary/Chest: Effort normal and breath sounds normal    Abdominal: Normal appearance  Lymphadenopathy:     He has no cervical adenopathy  Neurological: He is alert and oriented to person, place, and time  Skin: Skin is warm, dry and intact  Psychiatric: He has a normal mood and affect  Thought content normal    Nursing note and vitals reviewed  BMI Counseling: Body mass index is 37 87 kg/m²  The BMI is above normal  Nutrition recommendations include decreasing portion sizes, consuming healthier snacks, moderation in carbohydrate intake, reducing intake of saturated and trans fat and reducing intake of cholesterol  Exercise recommendations include moderate physical activity 150 minutes/week, exercising 3-5 times per week and strength training exercises  TCM Call (since 12/6/2019)     Date and time call was made  12/30/2019 10:00 AM    Hospital care reviewed  Records reviewed    Patient was hospitialized at  [facility]    Date of Admission  12/26/19    Date of discharge  12/28/19    Diagnosis  Exertional Chest Pain    Disposition  Home    Were the patients medications reviewed and updated  No    Current Symptoms  Shortness of breath    Shortness of breath severity  Mild      TCM Call (since 12/6/2019)     Post hospital issues  None    Scheduled for follow up?   Yes    Did you obtain your prescribed medications  Yes    Do you need help managing your prescriptions or medications  No    Is transportation to your appointment needed  No    I have advised the patient to call PCP with any new or worsening symptoms  Sary Velasco MA    Are you recieving any outpatient services  No    Are you recieving home care services  No    Are you using any community resources  No    Current waiver services  No Have you fallen in the last 12 months  No    Interperter language line needed  No    Counseling  Patient      ,

## 2020-01-06 NOTE — PATIENT INSTRUCTIONS
Dyspnea Scale and Exercise   AMBULATORY CARE:   A dyspnea scale  is a way to describe shortness of breath you feel during exercise  The scale may be used during exercise at pulmonary rehabilitation or at home  There are several different dyspnea scales that your healthcare providers may use  Your healthcare providers may teach you to use the Rating of Perceived Dyspnea (RPD) scale during exercise or tasks  This scale allows you to rate the amount of shortness of breath you feel  The scale can help you realize how short of breath you are with specific activities  Your ratings on the scale can help you pace your activity  Call 911 or have someone close to you call 911 if:   · You have increasing shortness of breath  · You have chest pain  · You have burning, tightness, heaviness, or pressure in your chest     · You have pain in your shoulders, arm, neck, jaw, or back that is not usual     · Your heartbeat is racing or it skips a beat  Contact your healthcare provider if:   · You are lightheaded, dizzy, or have nausea  · You feel much more tired than usual     · You have new or different joint pain  At first,  your shortness of breath may be severe  As you continue your exercise plan, you should notice improvement  The RPD scale uses a scale from 0 to 10  A score of 0 means you have no shortness of breath at all  At 10, you are so short of breath that you need to stop the exercise or activity   The goal is to keep your rating between 3 and 4   · 0 = no shortness of breath at all    · 0 5 = very, very slight shortness of breath    · 1 = very mild shortness of breath    · 2 = mild shortness of breath    · 3 = moderate shortness of breath or breathing difficulty    · 4 = somewhat severe shortness of breath    · 5 = strong or hard breathing    · 6    · 7 = severe shortness of breath or very hard breathing     · 8    · 9 = extremely severe shortness of breath    · 10 = shortness of breath so severe you need to stop exercise or activity  © 2017 [employer]  Information is for End User's use only and may not be sold, redistributed or otherwise used for commercial purposes  All illustrations and images included in CareNotes® are the copyrighted property of [employer]  or [employer] Oswald  The above information is an  only  It is not intended as medical advice for individual conditions or treatments  Talk to your doctor, nurse or pharmacist before following any medical regimen to see if it is safe and effective for you  Low Fat Diet   AMBULATORY CARE:   A low-fat diet  is an eating plan that is low in total fat, unhealthy fat, and cholesterol  You may need to follow a low-fat diet if you have trouble digesting or absorbing fat  You may also need to follow this diet if you have high cholesterol  You can also lower your cholesterol by increasing the amount of fiber in your diet  Soluble fiber is a type of fiber that helps to decrease cholesterol levels  Different types of fat in food:   · Limit unhealthy fats  A diet that is high in cholesterol, saturated fat, and trans fat may cause unhealthy cholesterol levels  Unhealthy cholesterol levels increase your risk of heart disease  ¨ Cholesterol:  Limit intake of cholesterol to less than 200 mg per day  Cholesterol is found in meat, eggs, and dairy  ¨ Saturated fat:  Limit saturated fat to less than 7% of your total daily calories  Ask your dietitian how many calories you need each day  Saturated fat is found in butter, cheese, ice cream, whole milk, and palm oil  Saturated fat is also found in meat, such as beef, pork, chicken skin, and processed meats  Processed meats include sausage, hot dogs, and bologna  ¨ Trans fat:  Avoid trans fat as much as possible  Trans fat is used in fried and baked foods  Foods that say trans fat free on the label may still have up to 0 5 grams of trans fat per serving  · Include healthy fats  Replace foods that are high in saturated and trans fat with foods high in healthy fats  This may help to decrease high cholesterol levels  ¨ Monounsaturated fats: These are found in avocados, nuts, and vegetable oils, such as olive, canola, and sunflower oil  ¨ Polyunsaturated fats: These can be found in vegetable oils, such as soybean or corn oil  Omega-3 fats can help to decrease the risk of heart disease  Omega-3 fats are found in fish, such as salmon, herring, trout, and tuna  Omega-3 fats can also be found in plant foods, such as walnuts, flaxseed, soybeans, and canola oil    Foods to limit or avoid:   · Grains:      ¨ Snacks that are made with partially hydrogenated oils, such as chips, regular crackers, and butter-flavored popcorn    ¨ High-fat baked goods, such as biscuits, croissants, doughnuts, pies, cookies, and pastries    · Dairy:      ¨ Whole milk, 2% milk, and yogurt and ice cream made with whole milk    ¨ Half and half creamer, heavy cream, and whipping cream    ¨ Cheese, cream cheese, and sour cream    · Meats and proteins:      ¨ High-fat cuts of meat (T-bone steak, regular hamburger, and ribs)    ¨ Fried meat, poultry (turkey and chicken), and fish    ¨ Poultry (chicken and turkey) with skin    ¨ Cold cuts (salami or bologna), hot dogs, snyder, and sausage    ¨ Whole eggs and egg yolks    · Vegetables and fruits with added fat:      ¨ Fried vegetables or vegetables in butter or high-fat sauces, such as cream or cheese sauces    ¨ Fried fruit or fruit served with butter or cream    · Fats:      ¨ Butter, stick margarine, and shortening    ¨ Coconut, palm oil, and palm kernel oil  Foods to include:   · Grains:      ¨ Whole-grain breads, cereals, pasta, and brown rice    ¨ Low-fat crackers and pretzels    · Vegetables and fruits:      ¨ Fresh, frozen, or canned vegetables (no salt or low-sodium)    ¨ Fresh, frozen, dried, or canned fruit (canned in light syrup or fruit juice)    ¨ Avocado    · Low-fat dairy products:      ¨ Nonfat (skim) or 1% milk    ¨ Nonfat or low-fat cheese, yogurt, and cottage cheese    · Meats and proteins:      ¨ Chicken or turkey with no skin    ¨ Baked or broiled fish    ¨ Lean beef and pork (loin, round, extra lean hamburger)    ¨ Beans and peas, unsalted nuts, soy products    ¨ Egg whites and substitutes    ¨ Seeds and nuts    · Fats:      ¨ Unsaturated oil, such as canola, olive, peanut, soybean, or sunflower oil    ¨ Soft or liquid margarine and vegetable oil spread    ¨ Low-fat salad dressing  Other ways to decrease fat:   · Read food labels before you buy foods  Choose foods that have less than 30% of calories from fat  Choose low-fat or fat-free dairy products  Remember that fat free does not mean calorie free  These foods still contain calories, and too many calories can lead to weight gain  · Trim fat from meat and avoid fried food  Trim all visible fat from meat before you cook it  Remove the skin from poultry  Do not alanis meat, fish, or poultry  Bake, roast, boil, or broil these foods instead  Avoid fried foods  Eat a baked potato instead of [place] fries  Steam vegetables instead of sautéing them in butter  · Add less fat to foods  Use imitation snyder bits on salads and baked potatoes instead of regular snyder bits  Use fat-free or low-fat salad dressings instead of regular dressings  Use low-fat or nonfat butter-flavored topping instead of regular butter or margarine on popcorn and other foods  Ways to decrease fat in recipes:  Replace high-fat ingredients with low-fat or nonfat ones  This may cause baked goods to be drier than usual  You may need to use nonfat cooking spray on pans to prevent food from sticking  You also may need to change the amount of other ingredients, such as water, in the recipe  Try the following:  · Use low-fat or light margarine instead of regular margarine or shortening       · Use lean ground turkey breast or chicken, or lean ground beef (less than 5% fat) instead of hamburger  · Add 1 teaspoon of canola oil to 8 ounces of skim milk instead of using cream or half and half  · Use grated zucchini, carrots, or apples in breads instead of coconut  · Use blenderized, low-fat cottage cheese, plain tofu, or low-fat ricotta cheese instead of cream cheese  · Use 1 egg white and 1 teaspoon of canola oil, or use ¼ cup (2 ounces) of fat-free egg substitute instead of a whole egg  · Replace half of the oil that is called for in a recipe with applesauce when you bake  Use 3 tablespoons of cocoa powder and 1 tablespoon of canola oil instead of a square of baking chocolate  How to increase fiber:  Eat enough high-fiber foods to get 20 to 30 grams of fiber every day  Slowly increase your fiber intake to avoid stomach cramps, gas, and other problems  · Eat 3 ounces of whole-grain foods each day  An ounce is about 1 slice of bread  Eat whole-grain breads, such as whole-wheat bread  Whole wheat, whole-wheat flour, or other whole grains should be listed as the first ingredient on the food label  Replace white flour with whole-grain flour or use half of each in recipes  Whole-grain flour is heavier than white flour, so you may have to add more yeast or baking powder  · Eat a high-fiber cereal for breakfast   Oatmeal is a good source of soluble fiber  Look for cereals that have bran or fiber in the name  Choose whole-grain products, such as brown rice, barley, and whole-wheat pasta  · Eat more beans, peas, and lentils  For example, add beans to soups or salads  Eat at least 5 cups of fruits and vegetables each day  Eat fruits and vegetables with the peel because the peel is high in fiber  © 2017 [employer] Information is for End User's use only and may not be sold, redistributed or otherwise used for commercial purposes   All illustrations and images included in CareNotes® are the copyrighted property of [employer]  or Brigido Oswald  The above information is an  only  It is not intended as medical advice for individual conditions or treatments  Talk to your doctor, nurse or pharmacist before following any medical regimen to see if it is safe and effective for you

## 2020-01-13 LAB — VZV AG SPEC QL IF: DETECTED

## 2020-01-14 DIAGNOSIS — R19.5 POSITIVE FIT (FECAL IMMUNOCHEMICAL TEST): Primary | ICD-10-CM
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
## 2020-01-23 ENCOUNTER — CONSULT (OUTPATIENT)
Dept: HEMATOLOGY ONCOLOGY | Facility: CLINIC | Age: 70
End: 2020-01-23
Payer: MEDICARE

## 2020-01-23 VITALS
DIASTOLIC BLOOD PRESSURE: 82 MMHG | BODY MASS INDEX: 38.7 KG/M2 | HEART RATE: 95 BPM | RESPIRATION RATE: 14 BRPM | TEMPERATURE: 97.7 F | WEIGHT: 292 LBS | SYSTOLIC BLOOD PRESSURE: 142 MMHG | HEIGHT: 73 IN | OXYGEN SATURATION: 97 %

## 2020-01-23 DIAGNOSIS — M79.604 PAIN IN BOTH LOWER EXTREMITIES: ICD-10-CM

## 2020-01-23 DIAGNOSIS — R91.1 LUNG NODULE: ICD-10-CM

## 2020-01-23 DIAGNOSIS — I26.99 BILATERAL PULMONARY EMBOLISM (HCC): Primary | ICD-10-CM

## 2020-01-23 DIAGNOSIS — R10.13 EPIGASTRIC PAIN: ICD-10-CM

## 2020-01-23 DIAGNOSIS — I26.99 PULMONARY EMBOLISM (HCC): ICD-10-CM

## 2020-01-23 DIAGNOSIS — I82.91 CHRONIC EMBOLISM AND THROMBOSIS OF UNSPECIFIED VEIN: ICD-10-CM

## 2020-01-23 DIAGNOSIS — R20.9 UNSPECIFIED DISTURBANCES OF SKIN SENSATION: ICD-10-CM

## 2020-01-23 DIAGNOSIS — M79.605 PAIN IN BOTH LOWER EXTREMITIES: ICD-10-CM

## 2020-01-23 PROBLEM — R10.9 ABDOMINAL PAIN: Status: ACTIVE | Noted: 2020-01-23

## 2020-01-23 PROCEDURE — 99203 OFFICE O/P NEW LOW 30 MIN: CPT | Performed by: PHYSICIAN ASSISTANT

## 2020-01-28 ENCOUNTER — HOSPITAL ENCOUNTER (OUTPATIENT)
Dept: CT IMAGING | Facility: HOSPITAL | Age: 70
Discharge: HOME | End: 2020-01-28
Payer: MEDICARE

## 2020-01-28 ENCOUNTER — APPOINTMENT (OUTPATIENT)
Dept: LAB | Facility: HOSPITAL | Age: 70
End: 2020-01-28
Payer: MEDICARE

## 2020-01-28 ENCOUNTER — HOSPITAL ENCOUNTER (OUTPATIENT)
Dept: NON INVASIVE DIAGNOSTICS | Facility: HOSPITAL | Age: 70
Discharge: HOME | End: 2020-01-28
Payer: MEDICARE

## 2020-01-28 DIAGNOSIS — R20.9 UNSPECIFIED DISTURBANCES OF SKIN SENSATION: ICD-10-CM

## 2020-01-28 DIAGNOSIS — I26.99 BILATERAL PULMONARY EMBOLISM (HCC): ICD-10-CM

## 2020-01-28 DIAGNOSIS — Z12.5 SCREENING FOR PROSTATE CANCER: ICD-10-CM

## 2020-01-28 DIAGNOSIS — R91.1 LUNG NODULE: ICD-10-CM

## 2020-01-28 DIAGNOSIS — R10.13 EPIGASTRIC PAIN: ICD-10-CM

## 2020-01-28 DIAGNOSIS — M79.605 PAIN IN BOTH LOWER EXTREMITIES: ICD-10-CM

## 2020-01-28 DIAGNOSIS — I82.91 CHRONIC EMBOLISM AND THROMBOSIS OF UNSPECIFIED VEIN: ICD-10-CM

## 2020-01-28 DIAGNOSIS — E03.9 ACQUIRED HYPOTHYROIDISM: ICD-10-CM

## 2020-01-28 DIAGNOSIS — I26.99 PULMONARY EMBOLISM (HCC): ICD-10-CM

## 2020-01-28 DIAGNOSIS — M79.604 PAIN IN BOTH LOWER EXTREMITIES: ICD-10-CM

## 2020-01-28 LAB
D DIMER PPP FEU-MCNC: <0.19 UG/ML FEU
HCYS SERPL-SCNC: 12.2 UMOL/L (ref 5.3–14.2)
LDH SERPL-CCNC: 452 U/L (ref 313–618)
PSA SERPL-MCNC: 2 NG/ML (ref 0–4)
T4 FREE SERPL-MCNC: 1.22 NG/DL (ref 0.76–1.46)
TSH SERPL DL<=0.05 MIU/L-ACNC: 0.35 UIU/ML (ref 0.47–4.68)

## 2020-01-28 PROCEDURE — 85303 CLOT INHIBIT PROT C ACTIVITY: CPT

## 2020-01-28 PROCEDURE — 85300 ANTITHROMBIN III ACTIVITY: CPT

## 2020-01-28 PROCEDURE — 85306 CLOT INHIBIT PROT S FREE: CPT

## 2020-01-28 PROCEDURE — 83090 ASSAY OF HOMOCYSTEINE: CPT

## 2020-01-28 PROCEDURE — G0103 PSA SCREENING: HCPCS

## 2020-01-28 PROCEDURE — 85379 FIBRIN DEGRADATION QUANT: CPT

## 2020-01-28 PROCEDURE — 74177 CT ABD & PELVIS W/CONTRAST: CPT

## 2020-01-28 PROCEDURE — 86147 CARDIOLIPIN ANTIBODY EA IG: CPT

## 2020-01-28 PROCEDURE — 93970 EXTREMITY STUDY: CPT

## 2020-01-28 PROCEDURE — 85732 THROMBOPLASTIN TIME PARTIAL: CPT

## 2020-01-28 PROCEDURE — 85670 THROMBIN TIME PLASMA: CPT

## 2020-01-28 PROCEDURE — 36415 COLL VENOUS BLD VENIPUNCTURE: CPT

## 2020-01-28 PROCEDURE — 84439 ASSAY OF FREE THYROXINE: CPT

## 2020-01-28 PROCEDURE — 84443 ASSAY THYROID STIM HORMONE: CPT

## 2020-01-28 PROCEDURE — 85613 RUSSELL VIPER VENOM DILUTED: CPT

## 2020-01-28 PROCEDURE — 85305 CLOT INHIBIT PROT S TOTAL: CPT

## 2020-01-28 PROCEDURE — 86146 BETA-2 GLYCOPROTEIN ANTIBODY: CPT

## 2020-01-28 PROCEDURE — 85705 THROMBOPLASTIN INHIBITION: CPT

## 2020-01-28 PROCEDURE — 83615 LACTATE (LD) (LDH) ENZYME: CPT

## 2020-01-28 PROCEDURE — 71275 CT ANGIOGRAPHY CHEST: CPT

## 2020-01-28 RX ADMIN — IODIXANOL 100 ML: 320 INJECTION, SOLUTION INTRAVASCULAR at 13:28

## 2020-01-29 LAB
CARDIOLIPIN IGA SER IA-ACNC: <9 APL U/ML (ref 0–11)
CARDIOLIPIN IGG SER IA-ACNC: <9 GPL U/ML (ref 0–14)
CARDIOLIPIN IGM SER IA-ACNC: <9 MPL U/ML (ref 0–12)
DEPRECATED AT III PPP: 87 % OF NORMAL (ref 92–136)

## 2020-01-29 PROCEDURE — 93970 EXTREMITY STUDY: CPT | Performed by: SURGERY

## 2020-01-30 LAB
APTT SCREEN TO CONFIRM RATIO: 1.08 RATIO (ref 0–1.4)
CONFIRM APTT/NORMAL: 47.4 SEC (ref 0–55)
LA PPP-IMP: NORMAL
PROT C AG ACT/NOR PPP IA: 102 % OF NORMAL (ref 60–150)
PROT S ACT/NOR PPP: 122 % (ref 57–157)
PROT S PPP-ACNC: 91 % (ref 60–150)
SCREEN APTT: 34.5 SEC (ref 0–51.9)
SCREEN DRVVT: 46.3 SEC (ref 0–47)
THROMBIN TIME: 17.5 SEC (ref 0–23)

## 2020-01-31 LAB
B2 GLYCOPROT1 IGA SER-ACNC: <9 GPI IGA UNITS (ref 0–25)
B2 GLYCOPROT1 IGG SER-ACNC: <9 GPI IGG UNITS (ref 0–20)
B2 GLYCOPROT1 IGM SER-ACNC: <9 GPI IGM UNITS (ref 0–32)
PROT S ACT/NOR PPP: 111 % (ref 63–140)

## 2020-03-14 NOTE — ASSESSMENT & PLAN NOTE
This was due to bilateral PE  Will resolve this now  Abdomen soft, non-tender and non-distended, no rebound, no guarding and no masses. no hepatosplenomegaly.

## 2020-04-01 ENCOUNTER — TELEMEDICINE (OUTPATIENT)
Dept: FAMILY MEDICINE CLINIC | Facility: CLINIC | Age: 70
End: 2020-04-01
Payer: MEDICARE

## 2020-04-01 DIAGNOSIS — E03.9 ACQUIRED HYPOTHYROIDISM: ICD-10-CM

## 2020-04-01 DIAGNOSIS — R73.09 ELEVATED GLUCOSE: ICD-10-CM

## 2020-04-01 DIAGNOSIS — I26.99 BILATERAL PULMONARY EMBOLISM (HCC): Primary | ICD-10-CM

## 2020-04-01 DIAGNOSIS — F41.9 ANXIETY: ICD-10-CM

## 2020-04-01 PROCEDURE — 99214 OFFICE O/P EST MOD 30 MIN: CPT | Performed by: NURSE PRACTITIONER

## 2020-04-01 RX ORDER — LEVOTHYROXINE SODIUM 0.15 MG/1
150 TABLET ORAL DAILY
Qty: 90 TABLET | Refills: 1 | Status: SHIPPED | OUTPATIENT
Start: 2020-04-01 | End: 2020-08-14 | Stop reason: DRUGHIGH

## 2020-04-01 RX ORDER — CITALOPRAM 20 MG/1
20 TABLET ORAL DAILY
Qty: 90 TABLET | Refills: 1 | Status: SHIPPED | OUTPATIENT
Start: 2020-04-01 | End: 2020-07-16 | Stop reason: SDUPTHER

## 2020-04-01 RX ORDER — ALPRAZOLAM 2 MG/1
2 TABLET ORAL 3 TIMES DAILY PRN
Qty: 270 TABLET | Refills: 0 | Status: SHIPPED | OUTPATIENT
Start: 2020-04-01 | End: 2021-01-13 | Stop reason: SDUPTHER

## 2020-04-27 ENCOUNTER — TELEPHONE (OUTPATIENT)
Dept: FAMILY MEDICINE CLINIC | Facility: CLINIC | Age: 70
End: 2020-04-27

## 2020-04-28 ENCOUNTER — TELEMEDICINE (OUTPATIENT)
Dept: FAMILY MEDICINE CLINIC | Facility: CLINIC | Age: 70
End: 2020-04-28
Payer: MEDICARE

## 2020-04-28 VITALS — BODY MASS INDEX: 37.11 KG/M2 | HEIGHT: 73 IN | TEMPERATURE: 96.9 F | WEIGHT: 280 LBS

## 2020-04-28 DIAGNOSIS — I26.99 BILATERAL PULMONARY EMBOLISM (HCC): Primary | ICD-10-CM

## 2020-04-28 DIAGNOSIS — R06.02 SOB (SHORTNESS OF BREATH) ON EXERTION: ICD-10-CM

## 2020-04-28 PROCEDURE — 99214 OFFICE O/P EST MOD 30 MIN: CPT | Performed by: NURSE PRACTITIONER

## 2020-04-28 RX ORDER — ALBUTEROL SULFATE 90 UG/1
2 AEROSOL, METERED RESPIRATORY (INHALATION) EVERY 6 HOURS PRN
Qty: 1 INHALER | Refills: 0 | Status: SHIPPED | OUTPATIENT
Start: 2020-04-28 | End: 2021-01-13 | Stop reason: CLARIF

## 2020-07-02 ENCOUNTER — TELEPHONE (OUTPATIENT)
Dept: FAMILY MEDICINE CLINIC | Facility: CLINIC | Age: 70
End: 2020-07-02

## 2020-07-02 NOTE — TELEPHONE ENCOUNTER
Pt called in to inform HN that he came in contact with someone yesterday who has tested positive for covid  I went over covid screening questions pt stated no to all questions, stated he had no symptoms at this time  Pt wanted to know if he should still self isolate for 14 days? Also made pt aware if he were to develop any symptoms to give the office a call back to set up an appointment    Please give pt a call back thank you!!

## 2020-07-02 NOTE — TELEPHONE ENCOUNTER
If he had contact with known covid he should isolate for 14 days   If he develops symptoms he should contact us

## 2020-07-07 NOTE — TELEPHONE ENCOUNTER
Patient states he doesn't have any symptoms and the testing site would not test unless he had symptoms, fever   Patient will see HN next week

## 2020-07-16 ENCOUNTER — OFFICE VISIT (OUTPATIENT)
Dept: FAMILY MEDICINE CLINIC | Facility: CLINIC | Age: 70
End: 2020-07-16
Payer: MEDICARE

## 2020-07-16 VITALS
SYSTOLIC BLOOD PRESSURE: 142 MMHG | TEMPERATURE: 97.3 F | WEIGHT: 289 LBS | HEIGHT: 73 IN | DIASTOLIC BLOOD PRESSURE: 86 MMHG | BODY MASS INDEX: 38.3 KG/M2 | HEART RATE: 80 BPM

## 2020-07-16 DIAGNOSIS — F41.9 ANXIETY: Primary | ICD-10-CM

## 2020-07-16 DIAGNOSIS — R03.0 ELEVATED BLOOD PRESSURE READING: ICD-10-CM

## 2020-07-16 DIAGNOSIS — I26.99 BILATERAL PULMONARY EMBOLISM (HCC): ICD-10-CM

## 2020-07-16 DIAGNOSIS — N17.9 ACUTE KIDNEY INJURY (HCC): ICD-10-CM

## 2020-07-16 DIAGNOSIS — E03.9 ACQUIRED HYPOTHYROIDISM: ICD-10-CM

## 2020-07-16 DIAGNOSIS — R73.09 ELEVATED GLUCOSE: ICD-10-CM

## 2020-07-16 DIAGNOSIS — F13.90 SEDATIVE, HYPNOTIC, OR ANXIOLYTIC USE, UNSPECIFIED, UNCOMPLICATED: ICD-10-CM

## 2020-07-16 PROBLEM — R55 SYNCOPE: Status: RESOLVED | Noted: 2019-12-26 | Resolved: 2020-07-16

## 2020-07-16 PROCEDURE — 99214 OFFICE O/P EST MOD 30 MIN: CPT | Performed by: NURSE PRACTITIONER

## 2020-07-16 PROCEDURE — 3008F BODY MASS INDEX DOCD: CPT | Performed by: NURSE PRACTITIONER

## 2020-07-16 PROCEDURE — 4040F PNEUMOC VAC/ADMIN/RCVD: CPT | Performed by: NURSE PRACTITIONER

## 2020-07-16 PROCEDURE — 1036F TOBACCO NON-USER: CPT | Performed by: NURSE PRACTITIONER

## 2020-07-16 PROCEDURE — 1160F RVW MEDS BY RX/DR IN RCRD: CPT | Performed by: NURSE PRACTITIONER

## 2020-07-16 RX ORDER — CITALOPRAM 40 MG/1
40 TABLET ORAL DAILY
Qty: 90 TABLET | Refills: 1 | Status: SHIPPED | OUTPATIENT
Start: 2020-07-16 | End: 2021-01-13 | Stop reason: SDUPTHER

## 2020-07-16 NOTE — ASSESSMENT & PLAN NOTE
Will increase patient celexa to 40mg; while he is weaning off xanax  Next fill consider xanax 2mg BID  Patient was able to give UDS today

## 2020-07-16 NOTE — ASSESSMENT & PLAN NOTE
Patient TSH was upper level of normal  He is on levothyroxine 150mcg  He needs to get blood work complete

## 2020-07-16 NOTE — PROGRESS NOTES
Assessment and Plan:    Problem List Items Addressed This Visit        Endocrine    Acquired hypothyroidism     Patient TSH was upper level of normal  He is on levothyroxine 150mcg  He needs to get blood work complete  Relevant Orders    TSH, 3rd generation with Free T4 reflex    Hemoglobin S1M    Basic metabolic panel    Lipid panel       Cardiovascular and Mediastinum    Bilateral pulmonary embolism ([facility] [address] )     Patient has seldom SOB  He is on eliquis  He had repeat CTA which was normal  Since unproked patient to be on eliquis lifelong  Patient should set up follow up with hematology/oncology  Relevant Medications    apixaban (ELIQUIS) 5 mg       Genitourinary    Acute kidney injury ([facility] [address] )     Patient to have blood work, I have re-ordered his BMP  Patient kidney function was normal last checked  Relevant Orders    Basic metabolic panel       Other    Anxiety - Primary     Will increase patient celexa to 40mg; while he is weaning off xanax  Next fill consider xanax 2mg BID  Patient was able to give UDS today  Relevant Medications    citalopram (CeleXA) 40 mg tablet    Elevated glucose    Relevant Orders    Hemoglobin A1C    Elevated blood pressure reading     Patient blood pressure is elevated today  We discussed monitoring  Other Visit Diagnoses     Sedative, hypnotic, or anxiolytic use, unspecified, uncomplicated        Relevant Orders    Pain Management Profile 1 W/ Confirmation, Urine                 Diagnoses and all orders for this visit:    Anxiety  -     citalopram (CeleXA) 40 mg tablet; Take 1 tablet (40 mg total) by mouth daily    Elevated blood pressure reading    Bilateral pulmonary embolism (HCC)  -     apixaban (ELIQUIS) 5 mg; Take 1 tablet (5 mg total) by mouth 2 (two) times a day    Acquired hypothyroidism  -     TSH, 3rd generation with Free T4 reflex;  Future  -     Hemoglobin A1C  -     Basic metabolic panel  -     Lipid panel    Elevated glucose  - Hemoglobin A1C    Acute kidney injury ([facility] [address] )  -     Basic metabolic panel    Sedative, hypnotic, or anxiolytic use, unspecified, uncomplicated  -     Pain Management Profile 1 W/ Confirmation, Urine              Subjective:      Patient ID: Ashish Mahan is a 78 y o  male  CC:    Chief Complaint   Patient presents with    Follow-up     patient is here for a 3 month f/u re: chronic medical conditions  ak       HPI:    Patient was due for blood work; he has not had this complete yet  Patient presents today for follow up on his chronic conditions  Anxiety- patient is on alprazolam 2mg as well as celexa 20mg  Patient reports this has been variable  Patient states he cut back by one pill per day of the xanax in attempt to wean the medication since April  Patient has been on this medication since 1989  Patient goal is to decrease to one pill per day with long term goal of being off  Thyroid- patient is currently on levothyroxine 150mcg  Pulmonary embolism-patient remains on eliquis 5mg  Patient states he has not had any respiratory issues  The following portions of the patient's history were reviewed and updated as appropriate: allergies, current medications, past family history, past medical history, past social history, past surgical history and problem list       Review of Systems   Constitutional: Negative for diaphoresis, fatigue and unexpected weight change  HENT: Negative for trouble swallowing  Eyes: Negative for visual disturbance  Respiratory: Positive for shortness of breath (occasional uses inhaler twice resolved )  Negative for cough and chest tightness  Cardiovascular: Negative for chest pain, palpitations and leg swelling  Gastrointestinal: Negative for abdominal pain, constipation, nausea and vomiting  Endocrine: Negative for polydipsia, polyphagia and polyuria  Genitourinary: Negative for difficulty urinating  Musculoskeletal: Negative for arthralgias and myalgias  Neurological: Negative for dizziness, light-headedness and headaches  Psychiatric/Behavioral: Negative for dysphoric mood and sleep disturbance  The patient is nervous/anxious (slightly ramped up )  Data to review:       Objective:    Vitals:    07/16/20 1438   BP: 142/86   Pulse: 80   Temp: (!) 97 3 °F (36 3 °C)   Weight: 131 kg (289 lb)   Height: 6' 1" (1 854 m)        Physical Exam   Constitutional: He is oriented to person, place, and time  Vital signs are normal  He appears well-developed and well-nourished  He does not have a sickly appearance  He does not appear ill  HENT:   Head: Normocephalic and atraumatic  Mouth/Throat: Uvula is midline, oropharynx is clear and moist and mucous membranes are normal    Eyes: Pupils are equal, round, and reactive to light  Neck: No JVD present  Carotid bruit is not present  No thyromegaly present  Cardiovascular: Normal rate, regular rhythm, S1 normal, S2 normal and normal heart sounds  No murmur heard  Pulmonary/Chest: Effort normal and breath sounds normal    Abdominal: Normal appearance  Lymphadenopathy:     He has no cervical adenopathy  Neurological: He is alert and oriented to person, place, and time  Skin: Skin is warm, dry and intact  Psychiatric: Thought content normal  His mood appears anxious

## 2020-07-16 NOTE — ASSESSMENT & PLAN NOTE
Patient has seldom SOB  He is on eliquis  He had repeat CTA which was normal  Since unproked patient to be on eliquis lifelong  Patient should set up follow up with hematology/oncology

## 2020-07-16 NOTE — ASSESSMENT & PLAN NOTE
Patient to have blood work, I have re-ordered his BMP  Patient kidney function was normal last checked

## 2020-07-21 ENCOUNTER — TELEPHONE (OUTPATIENT)
Dept: FAMILY MEDICINE CLINIC | Facility: CLINIC | Age: 70
End: 2020-07-21

## 2020-08-12 LAB
BUN SERPL-MCNC: 19 MG/DL (ref 7–25)
BUN/CREAT SERPL: ABNORMAL (CALC) (ref 6–22)
CALCIUM SERPL-MCNC: 9.8 MG/DL (ref 8.6–10.3)
CHLORIDE SERPL-SCNC: 102 MMOL/L (ref 98–110)
CHOLEST SERPL-MCNC: 169 MG/DL
CHOLEST/HDLC SERPL: 4.3 (CALC)
CO2 SERPL-SCNC: 26 MMOL/L (ref 20–32)
CREAT SERPL-MCNC: 1.25 MG/DL (ref 0.7–1.25)
GLUCOSE SERPL-MCNC: 100 MG/DL (ref 65–99)
HBA1C MFR BLD: 6 % OF TOTAL HGB
HDLC SERPL-MCNC: 39 MG/DL
LDLC SERPL CALC-MCNC: 110 MG/DL (CALC)
NONHDLC SERPL-MCNC: 130 MG/DL (CALC)
POTASSIUM SERPL-SCNC: 4.7 MMOL/L (ref 3.5–5.3)
SL AMB EGFR AFRICAN AMERICAN: 68 ML/MIN/1.73M2
SL AMB EGFR NON AFRICAN AMERICAN: 59 ML/MIN/1.73M2
SODIUM SERPL-SCNC: 137 MMOL/L (ref 135–146)
T4 FREE SERPL-MCNC: 1.5 NG/DL (ref 0.8–1.8)
TRIGL SERPL-MCNC: 94 MG/DL
TSH SERPL-ACNC: 0.23 MIU/L (ref 0.4–4.5)

## 2020-08-14 DIAGNOSIS — E03.9 ACQUIRED HYPOTHYROIDISM: Primary | ICD-10-CM

## 2020-08-14 RX ORDER — LEVOTHYROXINE SODIUM 0.12 MG/1
125 TABLET ORAL
Qty: 90 TABLET | Refills: 3 | Status: SHIPPED | OUTPATIENT
Start: 2020-08-14 | End: 2021-01-13 | Stop reason: SDUPTHER

## 2020-09-16 ENCOUNTER — OFFICE VISIT (OUTPATIENT)
Dept: FAMILY MEDICINE CLINIC | Facility: CLINIC | Age: 70
End: 2020-09-16
Payer: MEDICARE

## 2020-09-16 VITALS
HEART RATE: 72 BPM | WEIGHT: 291 LBS | TEMPERATURE: 98.4 F | RESPIRATION RATE: 16 BRPM | DIASTOLIC BLOOD PRESSURE: 84 MMHG | HEIGHT: 73 IN | SYSTOLIC BLOOD PRESSURE: 118 MMHG | BODY MASS INDEX: 38.57 KG/M2

## 2020-09-16 DIAGNOSIS — R03.0 ELEVATED BLOOD PRESSURE READING: ICD-10-CM

## 2020-09-16 DIAGNOSIS — F41.9 ANXIETY: ICD-10-CM

## 2020-09-16 DIAGNOSIS — I26.99 BILATERAL PULMONARY EMBOLISM (HCC): Primary | ICD-10-CM

## 2020-09-16 DIAGNOSIS — Z00.00 MEDICARE ANNUAL WELLNESS VISIT, SUBSEQUENT: ICD-10-CM

## 2020-09-16 DIAGNOSIS — E03.9 ACQUIRED HYPOTHYROIDISM: ICD-10-CM

## 2020-09-16 DIAGNOSIS — Z23 ENCOUNTER FOR IMMUNIZATION: ICD-10-CM

## 2020-09-16 DIAGNOSIS — H61.23 BILATERAL IMPACTED CERUMEN: ICD-10-CM

## 2020-09-16 DIAGNOSIS — F13.90 SEDATIVE, HYPNOTIC, OR ANXIOLYTIC USE, UNSPECIFIED, UNCOMPLICATED: ICD-10-CM

## 2020-09-16 DIAGNOSIS — R73.03 PREDIABETES: ICD-10-CM

## 2020-09-16 PROBLEM — N17.9 ACUTE KIDNEY INJURY (HCC): Status: RESOLVED | Noted: 2019-12-26 | Resolved: 2020-09-16

## 2020-09-16 PROBLEM — R10.9 ABDOMINAL PAIN: Status: RESOLVED | Noted: 2020-01-23 | Resolved: 2020-09-16

## 2020-09-16 PROCEDURE — G0008 ADMIN INFLUENZA VIRUS VAC: HCPCS

## 2020-09-16 PROCEDURE — G0439 PPPS, SUBSEQ VISIT: HCPCS | Performed by: NURSE PRACTITIONER

## 2020-09-16 PROCEDURE — 36415 COLL VENOUS BLD VENIPUNCTURE: CPT | Performed by: NURSE PRACTITIONER

## 2020-09-16 PROCEDURE — 90662 IIV NO PRSV INCREASED AG IM: CPT

## 2020-09-16 PROCEDURE — 99214 OFFICE O/P EST MOD 30 MIN: CPT | Performed by: NURSE PRACTITIONER

## 2020-09-16 PROCEDURE — 80307 DRUG TEST PRSMV CHEM ANLYZR: CPT | Performed by: NURSE PRACTITIONER

## 2020-09-16 NOTE — ASSESSMENT & PLAN NOTE
Patient on celexa 40mg which he states has improved anxiety symptoms and mostly surrounded around situational  We both agreed for next fill to decreased xanax to BID  He is starting to take halves of the current prescription with adequate symptoms control depending on   We discussed slow wean  Ideally treatment goal would be to reduce Xanax to 1 mg daily as needed if not potentially have patient off medication completely  Due to extensive length of time frame being on these medications (greater than 20 years) would taper over several months  Controlled Substance Review    [place] PDMP or [place]  reviewed: No red flags were identified; safe to proceed with prescription       Last Fill 9/13/2020 at [address] [place] [address] collected today   Will try to send out the same way to reduce out of pocket cost
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
## 2020-09-16 NOTE — ASSESSMENT & PLAN NOTE
Patient was reminded to recheck his TSH after September 25th  His dose was decreased to 125 mcg due to slight over-correction

## 2020-09-16 NOTE — PATIENT INSTRUCTIONS
Prediabetes   AMBULATORY CARE:   Prediabetes  is a blood glucose level that is higher than normal  It is not high enough to be considered diabetes  Prediabetes increases your risk for type 2 diabetes and heart disease  Common symptoms include the following:  Prediabetes may not cause any symptoms, or it may cause symptoms similar to diabetes  These may include any of the following:  · More hunger or thirst than usual     · Frequent urination     · Weight loss without trying     · Blurred vision  Contact your healthcare provider if:   · You have more hunger or thirst than usual      · You are urinating more frequently than normal      · You lose weight without trying  · You have blurred vision  · You have questions or concerns about your condition or care  Medicines  may be given to control your blood sugar levels  Medicine may also be given to lower high blood pressure and high cholesterol  Manage prediabetes:  Weight loss and exercise work best to delay or prevent type 2 diabetes  You can decrease your risk of type 2 diabetes by doing the following:  · Lose weight if you are overweight  A weight loss of 7% of your body weight can help to lower your blood sugar level  For example, if you weigh 200 pounds, you should lose 14 pounds  · Exercise regularly  Exercise can help decrease your blood sugar level  It can also help to decrease your risk of heart disease and help you lose weight  Adults should exercise for at least 150 minutes every week  Spread this amount of exercise over at least 3 days a week  Do not skip exercise more than 2 days in a row  Children should exercise for at least 60 minutes on most days of the week  Examples of exercise include walking or swimming  Do not sit for longer than 30 minutes  Work with your healthcare provider to create an exercise plan  · Decrease the amount of calories you eat to help you lose weight   Eat a variety of fruits and vegetables, and eat whole-grain foods more often  Choose dairy foods, meat, and other protein foods that are low in fat  Eat fewer sweets such as candy, cookies, regular soda, and sweetened drinks  You can also decrease calories by eating smaller portion sizes  Work with your healthcare provider or dietitian to develop a meal plan that is right for you  · Do not smoke  Nicotine can damage blood vessels  Do not use e-cigarettes or smokeless tobacco in place of cigarettes or to help you quit  They still contain nicotine  Ask your healthcare provider for information if you currently smoke and need help quitting  Follow up with your healthcare provider as directed: You will need to return every year to get tested for diabetes  Write down your questions so you remember to ask them during your visits  © 2017 [facility] Information is for End User's use only and may not be sold, redistributed or otherwise used for commercial purposes  All illustrations and images included in CareNotes® are the copyrighted property of A D A M [employer]  or Brigido Oswald  The above information is an  only  It is not intended as medical advice for individual conditions or treatments  Talk to your doctor, nurse or pharmacist before following any medical regimen to see if it is safe and effective for you  Basic Carbohydrate Counting   AMBULATORY CARE:   Carbohydrate counting  is a way to plan your meals by counting the amount of carbohydrate in foods  Carbohydrates are the sugars, starches, and fiber found in fruit, grains, vegetables, and milk products  Carbohydrates increase your blood sugar levels  Carbohydrate counting can help you eat the right amount of carbohydrate to keep your blood sugar levels under control  What you need to know about planning meals using carbohydrate counting:  · A dietitian or healthcare provider will help you develop a healthy meal plan that works best for you   You will be taught how much carbohydrate to eat or drink for each meal and snack  Your meal plan will be based on your age, weight, usual food intake, and physical activity level  If you have diabetes, it will also include your blood sugar levels and diabetes medicine  Once you know how much carbohydrate you should eat, you can decide what type of food you want to eat  · You will need to know what foods contain carbohydrate and how much they contain  Keep track of the amount of carbohydrate in meals and snacks in order to follow your meal plan  Do not avoid carbohydrates or skip meals  Your blood sugar may fall too low if you do not eat enough carbohydrate or you skip meals  Foods that contain carbohydrate:   · Breads:  Each serving of food listed below contains about 15 g of carbohydrate   ¨ 1 slice of bread (1 ounce) or 1 flour or corn tortilla (6 inch)    ¨ ½ of a hamburger bun or ¼ of a large bagel (about 1 ounce)    ¨ 1 pancake (about 4 inches across and ¼ inch thick)    · Cereals and grains:  Serving sizes of ready-to-eat cereals vary  Look at the serving size and the total carbohydrate amount listed on the food label  Each serving of food listed below contains about 15 g of carbohydrate   ¨ ¾ cup of dry, unsweetened, ready-to-eat cereal or ¼ cup of low-fat granola     ¨ ½ cup of oatmeal or other cooked cereal     ¨ ? cup of cooked rice or pasta    · Starchy vegetables and beans:  Each serving of food listed below contains about 15 g of carbohydrate   ¨ ½ cup of corn, green peas, sweet potatoes, or mashed potatoes    ¨ ¼ of a large baked potato    ¨ ½ cup of beans, lentils, and peas (garbanzo, buenrostro, kidney, white, split, black-eyed)    · Crackers and snacks:  Each serving of food listed below contains about 15 g of carbohydrate       ¨ 3 denice cracker squares or 8 animal crackers     ¨ 6 saltine-type crackers    ¨ 3 cups of popcorn or ¾ ounce of pretzels, potato chips, or tortilla chips    · Fruit:  Each serving of food listed below contains about 15 g of carbohydrate   ¨ 1 small (4 ounce) piece of fresh fruit or ¾ to 1 cup of fresh fruit    ¨ ½ cup of canned or frozen fruit, packed in natural juice    ¨ ½ cup (4 ounces) of unsweetened fruit juice    ¨ 2 tablespoons of dried fruit    · Desserts or sugary foods:  Each serving of food listed below contains about 15 g of carbohydrate   ¨ 2-inch square unfrosted cake or brownie     ¨ 2 small cookies    ¨ ½ cup of ice cream, frozen yogurt, or nondairy frozen yogurt    ¨ ¼ cup of sherbet or sorbet    ¨ 1 tablespoon of regular syrup, jam, or jelly    ¨ 2 tablespoons of light syrup    · Milk and yogurt:  Foods from the milk group contain about 12 g of carbohydrate per serving  ¨ 1 cup of fat-free or low-fat milk    ¨ 1 cup of soy milk    ¨ ? cup of fat-free, yogurt sweetened with artificial sweetener    · Non-starchy vegetables:  Each serving contains about 5 g of carbohydrate   Three servings of non-starch vegetables count as 1 carbohydrate serving  ¨ ½ cup of cooked vegetables or 1 cup of raw vegetables  This includes beets, broccoli, cabbage, cauliflower, cucumber, mushrooms, tomatoes, and zucchini    ¨ ½ cup of vegetable juice  How to use carbohydrate counting to plan meals:   · Count carbohydrate amounts using serving sizes:      ¨ Pasta dinner example: You plan to have pasta, tossed salad, and an 8-ounce glass of milk  Your healthcare provider tells you that you may have 4 carbohydrate servings for dinner  One carbohydrate serving of pasta is ? cup  One cup of pasta will equal 3 carbohydrate servings  An 8-ounce glass of milk will count as 1 carbohydrate serving  These amounts of food would equal 4 carbohydrate servings  One cup of tossed salad does not count toward your carbohydrate servings  · Count carbohydrate amounts using food labels:  Find the total amount of carbohydrate in a packaged food by reading the food label   Food labels tell you the serving size of the food and the total carbohydrate amount in each serving  Find the serving size on the food label and then decide how many servings you will eat  Multiply the number of servings you plan to eat by the carbohydrate amount per serving  ¨ Granola bar snack example: Your meal plan allows you to have 2 carbohydrate servings (30 grams) of carbohydrate for a snack  You plan to eat 1 package of granola bars, which contains 2 bars  According to the food label, the serving size of food in this package is 1 bar  Each serving (1 bar) contains 25 grams of carbohydrate  The total amount of carbohydrate in this package of granola bars would be 50 g  Based on your meal plan, you should eat only 1 bar  Follow up with your healthcare provider as directed:  Write down your questions so you remember to ask them during your visits  © 2017 [address] [employer] Information is for End User's use only and may not be sold, redistributed or otherwise used for commercial purposes  All illustrations and images included in CareNotes® are the copyrighted property of A [employer] A M [employer] Inc  or Brigido Oswald  The above information is an  only  It is not intended as medical advice for individual conditions or treatments  Talk to your doctor, nurse or pharmacist before following any medical regimen to see if it is safe and effective for you  Medicare Preventive Visit Patient Instructions  Thank you for completing your Welcome to Medicare Visit or Medicare Annual Wellness Visit today  Your next wellness visit will be due in one year (9/16/2021)  The screening/preventive services that you may require over the next 5-10 years are detailed below  Some tests may not apply to you based off risk factors and/or age  Screening tests ordered at today's visit but not completed yet may show as past due  Also, please note that scanned in results may not display below    Preventive Screenings:  Service Recommendations Previous Testing/Comments   Colorectal Cancer Screening  · Colonoscopy    · Fecal Occult Blood Test (FOBT)/Fecal Immunochemical Test (FIT)  · Fecal DNA/Cologuard Test  · Flexible Sigmoidoscopy Age: 54-65 years old   Colonoscopy: every 10 years (May be performed more frequently if at higher risk)  OR  FOBT/FIT: every 1 year  OR  Cologuard: every 3 years  OR  Sigmoidoscopy: every 5 years  Screening may be recommended earlier than age 48 if at higher risk for colorectal cancer  Also, an individualized decision between you and your healthcare provider will decide whether screening between the ages of 74-80 would be appropriate  Colonoscopy: Not on file  FOBT/FIT: 01/06/2020  Cologuard: Not on file  Sigmoidoscopy: Not on file    Screening Current     Prostate Cancer Screening Individualized decision between patient and health care provider in men between ages of 53-78   Medicare will cover every 12 months beginning on the day after your 50th birthday PSA: 2 0 ng/mL     Screening Current     Hepatitis C Screening Once for adults born between [facility]  More frequently in patients at high risk for Hepatitis C Hep C Antibody: Not on file    Screening Current   Diabetes Screening 1-2 times per year if you're at risk for diabetes or have pre-diabetes Fasting glucose: 117 mg/dL   A1C: 6 0 % of total Hgb    Screening Current   Cholesterol Screening Once every 5 years if you don't have a lipid disorder  May order more often based on risk factors  Lipid panel: 08/11/2020    Screening Current      Other Preventive Screenings Covered by Medicare:  1  Abdominal Aortic Aneurysm (AAA) Screening: covered once if your at risk  You're considered to be at risk if you have a family history of AAA or a male between the age of 73-68 who smoking at least 100 cigarettes in your lifetime    2  Lung Cancer Screening: covers low dose CT scan once per year if you meet all of the following conditions: (1) Age 50-69; (2) No signs or symptoms of lung cancer; (3) Current smoker or have quit smoking within the last 15 years; (4) You have a tobacco smoking history of at least 30 pack years (packs per day x number of years you smoked); (5) You get a written order from a healthcare provider  3  Glaucoma Screening: covered annually if you're considered high risk: (1) You have diabetes OR (2) Family history of glaucoma OR (3)  aged 48 and older OR (3)  American aged 72 and older  3  Osteoporosis Screening: covered every 2 years if you meet one of the following conditions: (1) Have a vertebral abnormality; (2) On glucocorticoid therapy for more than 3 months; (3) Have primary hyperparathyroidism; (4) On osteoporosis medications and need to assess response to drug therapy  5  HIV Screening: covered annually if you're between the age of 12-76  Also covered annually if you are younger than 13 and older than 72 with risk factors for HIV infection  For pregnant patients, it is covered up to 3 times per pregnancy  Immunizations:  Immunization Recommendations   Influenza Vaccine Annual influenza vaccination during flu season is recommended for all persons aged >= 6 months who do not have contraindications   Pneumococcal Vaccine (Prevnar and Pneumovax)  * Prevnar = PCV13  * Pneumovax = PPSV23 Adults 25-60 years old: 1-3 doses may be recommended based on certain risk factors  Adults 72 years old: Prevnar (PCV13) vaccine recommended followed by Pneumovax (PPSV23) vaccine  If already received PPSV23 since turning 65, then PCV13 recommended at least one year after PPSV23 dose  Hepatitis B Vaccine 3 dose series if at intermediate or high risk (ex: diabetes, end stage renal disease, liver disease)   Tetanus (Td) Vaccine - COST NOT COVERED BY MEDICARE PART B Following completion of primary series, a booster dose should be given every 10 years to maintain immunity against tetanus  Td may also be given as tetanus wound prophylaxis     Tdap Vaccine - COST NOT COVERED BY MEDICARE PART B Recommended at least once for all adults  For pregnant patients, recommended with each pregnancy  Shingles Vaccine (Shingrix) - COST NOT COVERED BY MEDICARE PART B  2 shot series recommended in those aged 48 and above     Health Maintenance Due:      Topic Date Due    Hepatitis C Screening  09/03/2021 (Originally 1950)     Immunizations Due:      Topic Date Due    Influenza Vaccine  07/01/2020     Advance Directives   What are advance directives? Advance directives are legal documents that state your wishes and plans for medical care  These plans are made ahead of time in case you lose your ability to make decisions for yourself  Advance directives can apply to any medical decision, such as the treatments you want, and if you want to donate organs  What are the types of advance directives? There are many types of advance directives, and each state has rules about how to use them  You may choose a combination of any of the following:  · Living will: This is a written record of the treatment you want  You can also choose which treatments you do not want, which to limit, and which to stop at a certain time  This includes surgery, medicine, IV fluid, and tube feedings  · Durable power of  for healthcare [facility]): This is a written record that states who you want to make healthcare choices for you when you are unable to make them for yourself  This person, called a proxy, is usually a family member or a friend  You may choose more than 1 proxy  · Do not resuscitate (DNR) order:  A DNR order is used in case your heart stops beating or you stop breathing  It is a request not to have certain forms of treatment, such as CPR  A DNR order may be included in other types of advance directives  · Medical directive: This covers the care that you want if you are in a coma, near death, or unable to make decisions for yourself   You can list the treatments you want for each condition  Treatment may include pain medicine, surgery, blood transfusions, dialysis, IV or tube feedings, and a ventilator (breathing machine)  · Values history: This document has questions about your views, beliefs, and how you feel and think about life  This information can help others choose the care that you would choose  Why are advance directives important? An advance directive helps you control your care  Although spoken wishes may be used, it is better to have your wishes written down  Spoken wishes can be misunderstood, or not followed  Treatments may be given even if you do not want them  An advance directive may make it easier for your family to make difficult choices about your care  Fall Prevention    Fall prevention  includes ways to make your home and other areas safer  It also includes ways you can move more carefully to prevent a fall  Health conditions that cause changes in your blood pressure, vision, or muscle strength and coordination may increase your risk for falls  Medicines may also increase your risk for falls if they make you dizzy, weak, or sleepy  Fall prevention tips:   · Stand or sit up slowly  · Use assistive devices as directed  · Wear shoes that fit well and have soles that   · Wear a personal alarm  · Stay active  · Manage your medical conditions  Home Safety Tips:  · Add items to prevent falls in the bathroom  · Keep paths clear  · Install bright lights in your home  · Keep items you use often on shelves within reach  · Paint or place reflective tape on the edges of your stairs  Weight Management   Why it is important to manage your weight:  Being overweight increases your risk of health conditions such as heart disease, high blood pressure, type 2 diabetes, and certain types of cancer  It can also increase your risk for osteoarthritis, sleep apnea, and other respiratory problems  Aim for a slow, steady weight loss   Even a small amount of weight loss can lower your risk of health problems  How to lose weight safely:  A safe and healthy way to lose weight is to eat fewer calories and get regular exercise  You can lose up about 1 pound a week by decreasing the number of calories you eat by 500 calories each day  Healthy meal plan for weight management:  A healthy meal plan includes a variety of foods, contains fewer calories, and helps you stay healthy  A healthy meal plan includes the following:  · Eat whole-grain foods more often  A healthy meal plan should contain fiber  Fiber is the part of grains, fruits, and vegetables that is not broken down by your body  Whole-grain foods are healthy and provide extra fiber in your diet  Some examples of whole-grain foods are whole-wheat breads and pastas, oatmeal, brown rice, and bulgur  · Eat a variety of vegetables every day  Include dark, leafy greens such as spinach, kale, joseph greens, and mustard greens  Eat yellow and orange vegetables such as carrots, sweet potatoes, and winter squash  · Eat a variety of fruits every day  Choose fresh or canned fruit (canned in its own juice or light syrup) instead of juice  Fruit juice has very little or no fiber  · Eat low-fat dairy foods  Drink fat-free (skim) milk or 1% milk  Eat fat-free yogurt and low-fat cottage cheese  Try low-fat cheeses such as mozzarella and other reduced-fat cheeses  · Choose meat and other protein foods that are low in fat  Choose beans or other legumes such as split peas or lentils  Choose fish, skinless poultry (chicken or turkey), or lean cuts of red meat (beef or pork)  Before you cook meat or poultry, cut off any visible fat  · Use less fat and oil  Try baking foods instead of frying them  Add less fat, such as margarine, sour cream, regular salad dressing and mayonnaise to foods  Eat fewer high-fat foods  Some examples of high-fat foods include french fries, doughnuts, ice cream, and cakes    · Eat fewer sweets  Limit foods and drinks that are high in sugar  This includes candy, cookies, regular soda, and sweetened drinks  Exercise:  Exercise at least 30 minutes per day on most days of the week  Some examples of exercise include walking, biking, dancing, and swimming  You can also fit in more physical activity by taking the stairs instead of the elevator or parking farther away from stores  Ask your healthcare provider about the best exercise plan for you  © Copyright [employer] 2018 Information is for End User's use only and may not be sold, redistributed or otherwise used for commercial purposes   All illustrations and images included in CareNotes® are the copyrighted property of A D A M , Inc  or [address] [place] [address]

## 2020-09-16 NOTE — PROGRESS NOTES
Assessment and Plan:    Problem List Items Addressed This Visit        Endocrine    Acquired hypothyroidism     Patient was reminded to recheck his TSH after September 25th  His dose was decreased to 125 mcg due to slight over-correction  Cardiovascular and Mediastinum    Bilateral pulmonary embolism ([address] ) - Primary     Lifelong treatment with eliquis indicated  Has not had appt with hematology  Nervous and Auditory    Bilateral impacted cerumen     Patient advised to use debrox to both ears and return in 1 week for ear flush  Relevant Medications    carbamide peroxide (DEBROX) 6 5 % otic solution       Other    Anxiety     Patient on celexa 40mg which he states has improved anxiety symptoms and mostly surrounded around situational  We both agreed for next fill to decreased xanax to BID  He is starting to take halves of the current prescription with adequate symptoms control depending on   We discussed slow wean  Ideally treatment goal would be to reduce Xanax to 1 mg daily as needed if not potentially have patient off medication completely  Due to extensive length of time frame being on these medications (greater than 20 years) would taper over several months  Controlled Sub[place]    [place]  reviewed: No red flags were identified; safe to proceed with prescription       Last Fill 9/13/2020 at [address] [place] [address] collected today  Will try to send out the same way to reduce out of pocket cost           Prediabetes     Patient a1c 6 0  again discussed lifestyle modification and continued dietary control  We also discussed starting metformin daily  He has increased in his weight this certainly can contribute to increased blood sugar  Will provide nutritional handouts and basics from carb counting  Elevated blood pressure reading     Patient blood pressure is normal today  Transient elevation continue monitoring            Medicare annual wellness visit, subsequent      Other Visit Diagnoses     Sedative, hypnotic, or anxiolytic use, unspecified, uncomplicated        Relevant Orders    MISCELLANEOUS LAB TEST    Encounter for immunization        Relevant Orders    influenza vaccine, high-dose, PF 0 7 mL (FLUZONE HIGH-DOSE) (Completed)                 Diagnoses and all orders for this visit:    Bilateral pulmonary embolism (HCC)    Acquired hypothyroidism    Prediabetes    Elevated blood pressure reading    Anxiety    Bilateral impacted cerumen  -     carbamide peroxide (DEBROX) 6 5 % otic solution; Administer 5 drops into both ears 2 (two) times a day for 7 days    Medicare annual wellness visit, subsequent    Sedative, hypnotic, or anxiolytic use, unspecified, uncomplicated  -     MISCELLANEOUS LAB TEST; Future  -     MISCELLANEOUS LAB TEST    Encounter for immunization  -     influenza vaccine, high-dose, PF 0 7 mL (FLUZONE HIGH-DOSE)              Subjective:      Patient ID: Edward Bass is a 78 y o  male  CC:    Chief Complaint   Patient presents with    Follow-up     Patient present today for a follow up on his blood pressure and anxiety   Medicare Wellness Visit     Pt due for Medicare Wellness Exam        HPI:    Patient recently had blood work which is available for review  His levothyroxine was adjusted and is due to have repeat TSH around 9/25/2020  Patient presents today to also follow up on his other chronic health conditions  Patient remains on Eliquis for bilateral pulmonary embolism  These were unprovoked so patient requires lifelong treatment  Patient's anxiety medication was increased Celexa to 40 mg  Patient does have alprazolam 2 mg 3 times a day currently  Patient reports that he is been trying to reduce and is taking half tablet 3 times a day    He states since that increased with the Celexa anxiety has been more manageable however given that he has been on this medication since the 1990 there is definitely long-term use and withdrawal to be concerned  Patient has a new job as a  for [employer]  He is enjoying this and learning the routes has been somewhat difficult as they also recently moved to this area  The following portions of the patient's history were reviewed and updated as appropriate: allergies, current medications, past family history, past medical history, past social history, past surgical history and problem list       Review of Systems   Constitutional: Negative for diaphoresis, fatigue and unexpected weight change  HENT: Negative for trouble swallowing  Eyes: Negative for visual disturbance  Respiratory: Negative for cough, chest tightness and shortness of breath  Cardiovascular: Negative for chest pain, palpitations and leg swelling  Gastrointestinal: Negative for constipation  Endocrine: Negative for polydipsia, polyphagia and polyuria  Genitourinary: Negative for difficulty urinating  Musculoskeletal: Negative for arthralgias and myalgias  Neurological: Negative for dizziness, light-headedness and headaches  Psychiatric/Behavioral: Negative for sleep disturbance and suicidal ideas  The patient is not nervous/anxious (Better control)  Data to review:   Free T4   Date Value Ref Range Status   01/28/2020 1 22 0 76 - 1 46 ng/dL Final     Comment:       Specimen collection should occur prior to Sulfasalazine administration due to the potential for falsely elevated results       Free t4   Date Value Ref Range Status   08/11/2020 1 5 0 8 - 1 8 ng/dL Final     Total Cholesterol   Date Value Ref Range Status   08/11/2020 169 <200 mg/dL Final     HDL   Date Value Ref Range Status   08/11/2020 39 (L) > OR = 40 mg/dL Final     Triglycerides   Date Value Ref Range Status   08/11/2020 94 <150 mg/dL Final     Glucose, Random   Date Value Ref Range Status   08/11/2020 100 (H) 65 - 99 mg/dL Final     Comment:                   Fasting reference interval     For someone without known diabetes, a glucose value  between 100 and 125 mg/dL is consistent with  prediabetes and should be confirmed with a  follow-up test           Sodium   Date Value Ref Range Status   08/11/2020 137 135 - 146 mmol/L Final     Potassium   Date Value Ref Range Status   08/11/2020 4 7 3 5 - 5 3 mmol/L Final     Creatinine   Date Value Ref Range Status   08/11/2020 1 25 0 70 - 1 25 mg/dL Final     Comment:     For patients >52years of age, the reference limit  for Creatinine is approximately 13% higher for people  identified as -American         12/28/2019 1 28 0 60 - 1 30 mg/dL Final     Comment:     Standardized to IDMS reference method     AST   Date Value Ref Range Status   12/26/2019 21 5 - 45 U/L Final     Comment:       Specimen collection should occur prior to Sulfasalazine administration due to the potential for falsely depressed results  ALT   Date Value Ref Range Status   12/26/2019 25 12 - 78 U/L Final     Comment:       Specimen collection should occur prior to Sulfasalazine administration due to the potential for falsely depressed results  Hemoglobin A1C   Date Value Ref Range Status   08/11/2020 6 0 (H) <5 7 % of total Hgb Final     Comment:     For someone without known diabetes, a hemoglobin   A1c value between 5 7% and 6 4% is consistent with  prediabetes and should be confirmed with a   follow-up test      For someone with known diabetes, a value <7%  indicates that their diabetes is well controlled  A1c  targets should be individualized based on duration of  diabetes, age, comorbid conditions, and other  considerations  This assay result is consistent with an increased risk  of diabetes  Currently, no consensus exists regarding use of  hemoglobin A1c for diagnosis of diabetes for children            Calcium   Date Value Ref Range Status   08/11/2020 9 8 8 6 - 10 3 mg/dL Final       TSH 3RD GENERATON   Date Value Ref Range Status   01/28/2020 0 346 (L) 0 465 - 4 680 uIU/mL Final     Comment:       Using supplements with high doses of biotin 20 to more than 300 times greater than the adequate daily intake for adults of 30 mcg/day as established by the [place] [employer], can cause falsely depress results  10/10/2016 3 87 0 40 - 4 50 mIU/L Final     Comment:       Performed at: [id number] [facility],[address], [place], [facility]  [address] [place] [id number]            Objective:    Vitals:    09/16/20 1110   BP: 118/84   BP Location: Left arm   Patient Position: Sitting   Cuff Size: Large   Pulse: 72   Resp: 16   Temp: 98 4 °F (36 9 °C)   TempSrc: Temporal   Weight: 132 kg (291 lb)   Height: 6' 1" (1 854 m)        Physical Exam  Vitals signs and nursing note reviewed  Constitutional:       Appearance: He is obese  He is not ill-appearing  HENT:      Head: Normocephalic and atraumatic  Right Ear: Tympanic membrane normal       Left Ear: Tympanic membrane normal    Eyes:      Extraocular Movements: Extraocular movements intact  Conjunctiva/sclera: Conjunctivae normal    Neck:      Thyroid: No thyromegaly  Vascular: No carotid bruit or JVD  Cardiovascular:      Rate and Rhythm: Normal rate and regular rhythm  Pulses:           Carotid pulses are 2+ on the right side and 2+ on the left side  Heart sounds: Normal heart sounds, S1 normal and S2 normal  No murmur  Pulmonary:      Effort: Pulmonary effort is normal  No accessory muscle usage or respiratory distress  Breath sounds: Normal breath sounds and air entry  No wheezing or rhonchi  Abdominal:      General: Abdomen is protuberant  Bowel sounds are normal  There is no distension  Palpations: Abdomen is soft  Musculoskeletal:      Right lower leg: No edema  Left lower leg: No edema  Skin:     Coloration: Skin is not pale  Findings: No erythema  Neurological:      Mental Status: He is alert and oriented to person, place, and time     Psychiatric: Mood and Affect: Mood normal          Behavior: Behavior normal          Thought Content:  Thought content normal          Judgment: Judgment normal

## 2020-09-23 LAB — MISCELLANEOUS LAB TEST RESULT: NORMAL

## 2021-01-13 ENCOUNTER — OFFICE VISIT (OUTPATIENT)
Dept: FAMILY MEDICINE CLINIC | Facility: CLINIC | Age: 71
End: 2021-01-13
Payer: MEDICARE

## 2021-01-13 VITALS
HEIGHT: 73 IN | WEIGHT: 298.6 LBS | SYSTOLIC BLOOD PRESSURE: 128 MMHG | TEMPERATURE: 97.5 F | DIASTOLIC BLOOD PRESSURE: 84 MMHG | HEART RATE: 104 BPM | BODY MASS INDEX: 39.57 KG/M2

## 2021-01-13 DIAGNOSIS — E03.9 ACQUIRED HYPOTHYROIDISM: ICD-10-CM

## 2021-01-13 DIAGNOSIS — R73.03 PREDIABETES: ICD-10-CM

## 2021-01-13 DIAGNOSIS — Z12.11 SCREENING FOR COLON CANCER: ICD-10-CM

## 2021-01-13 DIAGNOSIS — F33.41 RECURRENT MAJOR DEPRESSIVE DISORDER, IN PARTIAL REMISSION (HCC): ICD-10-CM

## 2021-01-13 DIAGNOSIS — G31.84 MCI (MILD COGNITIVE IMPAIRMENT): ICD-10-CM

## 2021-01-13 DIAGNOSIS — I26.99 BILATERAL PULMONARY EMBOLISM (HCC): ICD-10-CM

## 2021-01-13 DIAGNOSIS — F41.9 ANXIETY: Primary | ICD-10-CM

## 2021-01-13 DIAGNOSIS — E66.01 CLASS 2 SEVERE OBESITY DUE TO EXCESS CALORIES WITH SERIOUS COMORBIDITY AND BODY MASS INDEX (BMI) OF 37.0 TO 37.9 IN ADULT (HCC): ICD-10-CM

## 2021-01-13 PROCEDURE — 99214 OFFICE O/P EST MOD 30 MIN: CPT | Performed by: NURSE PRACTITIONER

## 2021-01-13 RX ORDER — CYCLOBENZAPRINE HCL 10 MG
TABLET ORAL
COMMUNITY
End: 2021-05-12 | Stop reason: ALTCHOICE

## 2021-01-13 RX ORDER — CITALOPRAM 40 MG/1
40 TABLET ORAL DAILY
Qty: 90 TABLET | Refills: 1 | Status: SHIPPED | OUTPATIENT
Start: 2021-01-13 | End: 2021-08-23 | Stop reason: SDUPTHER

## 2021-01-13 RX ORDER — ALPRAZOLAM 2 MG/1
2 TABLET ORAL 3 TIMES DAILY PRN
Qty: 270 TABLET | Refills: 0 | Status: SHIPPED | OUTPATIENT
Start: 2021-01-13 | End: 2021-08-23 | Stop reason: SDUPTHER

## 2021-01-13 RX ORDER — LEVOTHYROXINE SODIUM 0.12 MG/1
125 TABLET ORAL
Qty: 90 TABLET | Refills: 3 | Status: SHIPPED | OUTPATIENT
Start: 2021-01-13 | End: 2021-08-23 | Stop reason: SDUPTHER

## 2021-01-13 NOTE — PATIENT INSTRUCTIONS
Mild Cognitive Impairment: New Diagnosis   AMBULATORY CARE:   Mild cognitive impairment (MCI)  is a condition that causes problems with your memory, attention, and ability to think clearly  You may have problems in only one of these areas, or in all of them  These problems are common with aging, but MCI causes problems that are more than should be expected in a person your age  These problems are frustrating, but they are not severe enough to stop your activities of daily living  Rarely, MCI may go away, and your thinking may return to normal  MCI may instead get worse and develop into dementia  Dementia is a condition that causes severe loss of memory, thought control, and judgment  Alzheimer disease is the most common cause of dementia  Contact your healthcare provider if:   · You have new or worsening symptoms  · You have questions or concerns about your condition or care  Help create a care plan early:  A care plan is a way for you to make decisions about future care you may need  Your care plan can be set up so others know how to change it to meet your needs over time  This can be helpful if you are not able to make certain decisions later  Your providers will talk to you and anyone who lives or helps you about your personal and financial decisions  Your plan may include any of the following, depending on the type of MCI you have:  · Driving limits  may include how far or long you can drive, or not driving alone  · Gait training  includes teaching you and other people how to help you walk or exercise safely  · Home safety  includes instructions not to cook alone, or to set reminders to turn off appliances  Poison and firearm storage safety, and removal of tripping hazards can also be part of the plan  · Advanced directives  are legal documents you can sign ahead of time to help others know your wishes   Examples include a living will, durable power of , and financial and long-term care plans     Manage MCI:   · Do activities that engage you  Examples include computer games, solving jigsaw puzzles, gardening, and creating artwork  Activities that involve language skills include reading, writing, and solving crossword puzzles  You may want to join a group so you can interact with others who share your interests  Other people can help challenge and motivate you  · Manage health conditions that can worsen MCI  Hypertension, diabetes, and other conditions can cause MCI to progress to dementia  Talk to your healthcare provider if you need help managing a health condition you have  · Eat a variety of healthy foods  Healthy foods include fruits, vegetables, low-fat meats, fish, beans, whole-grain breads, and low-fat dairy products  Your provider may recommend a heart healthy diet to prevent atherosclerosis, or hardening of the arteries  This is a condition that increases your risk for dementia  A heart healthy diet is high in omega-3 fatty acids, fruits, and vegetables  It is low in saturated fats, salt, and sugar  · Exercise every day, or as directed  Physical activity may help improve memory and the ability to think clearly  Try to get 30 minutes of physical activity on most days of the week  Walking is a good activity for most people  Talk to your healthcare provider about the best activity for you  · Do not smoke  Nicotine in cigarettes and cigars contain harmful chemicals that can damage blood vessels to your brain  Smoking increases your risk for MCI and may cause it to progress more quickly  Ask your healthcare provider for information if you currently smoke and need help to quit  Smokeless tobacco products still contain nicotine  Talk to your healthcare provider before you use these products  Follow up with your healthcare provider as directed: You may need to come in 1 to 2 times each year for tests  The tests will show if your symptoms are progressing toward dementia  Write down your questions so you remember to ask them during your visits  © Copyright [address] Information is for End User's use only and may not be sold, redistributed or otherwise used for commercial purposes  All illustrations and images included in CareNotes® are the copyrighted property of [employer]  or [employer]  The above information is an  only  It is not intended as medical advice for individual conditions or treatments  Talk to your doctor, nurse or pharmacist before following any medical regimen to see if it is safe and effective for you

## 2021-01-13 NOTE — ASSESSMENT & PLAN NOTE
Patient is currently on levothyroxine 125mcg  He was asked to have TSH blood test redrawn to decide if we need to change medication

## 2021-01-13 NOTE — ASSESSMENT & PLAN NOTE
Patient takes xanax BID mostly  Controlled Substance Review    [place] or [place]  reviewed: No red flags were identified; safe to proceed with prescription
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
## 2021-01-13 NOTE — ASSESSMENT & PLAN NOTE
Patient currently on eliquis 5mg  Needs lifelong treatment  It has been one year since he was last followed up with hematology   He needs to schedule follow up

## 2021-01-13 NOTE — ASSESSMENT & PLAN NOTE
Patient did score 25/30  We discussed brain stimulating exercises that can include puzzles and reading  He would like to start prevagen which there is no definitive studies to suggest significant improvement but can certainly not cause harm  We can follow up in 6 months with repeat SLUMS test  I believe this is related more to distraction that actual impairment

## 2021-01-13 NOTE — ASSESSMENT & PLAN NOTE
Patient is due for A1c again  We had discussed lifestyle management  Unfortunately he did gain weight from prior visit

## 2021-03-10 DIAGNOSIS — Z23 ENCOUNTER FOR IMMUNIZATION: ICD-10-CM

## 2021-03-17 ENCOUNTER — IMMUNIZATIONS (OUTPATIENT)
Dept: FAMILY MEDICINE CLINIC | Facility: HOSPITAL | Age: 71
End: 2021-03-17

## 2021-03-17 DIAGNOSIS — Z23 ENCOUNTER FOR IMMUNIZATION: Primary | ICD-10-CM

## 2021-03-17 PROCEDURE — 91300 SARS-COV-2 / COVID-19 MRNA VACCINE (PFIZER-BIONTECH) 30 MCG: CPT

## 2021-03-17 PROCEDURE — 0001A SARS-COV-2 / COVID-19 MRNA VACCINE (PFIZER-BIONTECH) 30 MCG: CPT

## 2021-04-07 ENCOUNTER — IMMUNIZATIONS (OUTPATIENT)
Dept: FAMILY MEDICINE CLINIC | Facility: HOSPITAL | Age: 71
End: 2021-04-07

## 2021-04-07 DIAGNOSIS — Z23 ENCOUNTER FOR IMMUNIZATION: Primary | ICD-10-CM

## 2021-04-07 PROCEDURE — 91300 SARS-COV-2 / COVID-19 MRNA VACCINE (PFIZER-BIONTECH) 30 MCG: CPT

## 2021-04-07 PROCEDURE — 0002A SARS-COV-2 / COVID-19 MRNA VACCINE (PFIZER-BIONTECH) 30 MCG: CPT

## 2021-04-22 LAB
ALBUMIN SERPL-MCNC: 4.3 G/DL (ref 3.6–5.1)
ALBUMIN/GLOB SERPL: 1.7 (CALC) (ref 1–2.5)
ALP SERPL-CCNC: 51 U/L (ref 35–144)
ALT SERPL-CCNC: 15 U/L (ref 9–46)
AST SERPL-CCNC: 19 U/L (ref 10–35)
BILIRUB SERPL-MCNC: 0.5 MG/DL (ref 0.2–1.2)
BUN SERPL-MCNC: 19 MG/DL (ref 7–25)
BUN/CREAT SERPL: NORMAL (CALC) (ref 6–22)
CALCIUM SERPL-MCNC: 9.7 MG/DL (ref 8.6–10.3)
CHLORIDE SERPL-SCNC: 104 MMOL/L (ref 98–110)
CHOLEST SERPL-MCNC: 171 MG/DL
CHOLEST/HDLC SERPL: 4.6 (CALC)
CO2 SERPL-SCNC: 28 MMOL/L (ref 20–32)
CREAT SERPL-MCNC: 1.19 MG/DL (ref 0.7–1.25)
GLOBULIN SER CALC-MCNC: 2.6 G/DL (CALC) (ref 1.9–3.7)
GLUCOSE SERPL-MCNC: 91 MG/DL (ref 65–99)
HBA1C MFR BLD: 6.1 % OF TOTAL HGB
HDLC SERPL-MCNC: 37 MG/DL
LDLC SERPL CALC-MCNC: 114 MG/DL (CALC)
NONHDLC SERPL-MCNC: 134 MG/DL (CALC)
POTASSIUM SERPL-SCNC: 4.7 MMOL/L (ref 3.5–5.3)
PROT SERPL-MCNC: 6.9 G/DL (ref 6.1–8.1)
SL AMB EGFR AFRICAN AMERICAN: 72 ML/MIN/1.73M2
SL AMB EGFR NON AFRICAN AMERICAN: 62 ML/MIN/1.73M2
SODIUM SERPL-SCNC: 138 MMOL/L (ref 135–146)
TRIGL SERPL-MCNC: 95 MG/DL
TSH SERPL-ACNC: 0.41 MIU/L (ref 0.4–4.5)

## 2021-05-11 DIAGNOSIS — R19.5 POSITIVE COLORECTAL CANCER SCREENING USING COLOGUARD TEST: Primary | ICD-10-CM

## 2021-05-12 ENCOUNTER — OFFICE VISIT (OUTPATIENT)
Dept: FAMILY MEDICINE CLINIC | Facility: CLINIC | Age: 71
End: 2021-05-12
Payer: MEDICARE

## 2021-05-12 VITALS
DIASTOLIC BLOOD PRESSURE: 62 MMHG | HEIGHT: 73 IN | HEART RATE: 84 BPM | BODY MASS INDEX: 39.25 KG/M2 | TEMPERATURE: 97.1 F | WEIGHT: 296.2 LBS | SYSTOLIC BLOOD PRESSURE: 118 MMHG

## 2021-05-12 DIAGNOSIS — R91.1 LUNG NODULE: ICD-10-CM

## 2021-05-12 DIAGNOSIS — E66.01 CLASS 2 SEVERE OBESITY DUE TO EXCESS CALORIES WITH SERIOUS COMORBIDITY AND BODY MASS INDEX (BMI) OF 37.0 TO 37.9 IN ADULT (HCC): ICD-10-CM

## 2021-05-12 DIAGNOSIS — F13.90 SEDATIVE, HYPNOTIC, OR ANXIOLYTIC USE, UNSPECIFIED, UNCOMPLICATED: ICD-10-CM

## 2021-05-12 DIAGNOSIS — F13.120: ICD-10-CM

## 2021-05-12 DIAGNOSIS — E03.9 ACQUIRED HYPOTHYROIDISM: Primary | ICD-10-CM

## 2021-05-12 DIAGNOSIS — R73.03 PREDIABETES: ICD-10-CM

## 2021-05-12 DIAGNOSIS — I26.99 BILATERAL PULMONARY EMBOLISM (HCC): ICD-10-CM

## 2021-05-12 DIAGNOSIS — F41.9 ANXIETY: ICD-10-CM

## 2021-05-12 PROCEDURE — 99214 OFFICE O/P EST MOD 30 MIN: CPT | Performed by: NURSE PRACTITIONER

## 2021-05-12 PROCEDURE — 80307 DRUG TEST PRSMV CHEM ANLYZR: CPT | Performed by: NURSE PRACTITIONER

## 2021-05-12 NOTE — ASSESSMENT & PLAN NOTE
Pt will be working with daughter to reduce portion size and m al plan, pt will be starting walking program

## 2021-05-12 NOTE — ASSESSMENT & PLAN NOTE
Patient continues on eliquis 5mg  It has been more than a year since his hematology follow up  Patient at this time feels no need to follow up since he PE was cleared yet he remains on eliquis   I did advise patient we can get CT scan completed first and then reschedule care with hematologist

## 2021-05-12 NOTE — ASSESSMENT & PLAN NOTE
Incidental finding on review of imaging completed  ? 15mm nodule  He did have ct chest abd pelis for which no nodule seen  Patient needs follow up CT chest annually  Former smoker

## 2021-05-12 NOTE — PROGRESS NOTES
Assessment and Plan:    Problem List Items Addressed This Visit        Endocrine    Acquired hypothyroidism - Primary     Patient tsh remains normal  He is currently on levothyroxine 125mcg  No additional changes  Cardiovascular and Mediastinum    Bilateral pulmonary embolism ([facility] [address] )     Patient continues on eliquis 5mg  It has been more than a year since his hematology follow up  Patient at this time feels no need to follow up since he PE was cleared yet he remains on eliquis  I did advise patient we can get CT scan completed first and then reschedule care with hematologist              Other    Anxiety     UDS collected today  Currently on xanax TID  celexa 40mg           Class 2 severe obesity due to excess calories with serious comorbidity and body mass index (BMI) of 37 0 to 37 9 in [facility])     Pt will be working with daughter to reduce portion size and m al plan, pt will be starting walking program         Lung nodule     Incidental finding on review of imaging completed  ? 15mm nodule  He did have ct chest abd pelis for which no nodule seen  Patient needs follow up CT chest annually  Former smoker  Relevant Orders    CT lung nodule follow-up    Ambulatory referral to Pulmonology    Prediabetes    Relevant Medications    metFORMIN (GLUCOPHAGE) 500 mg tablet    Other Relevant Orders    Hemoglobin I3Q    Basic metabolic panel      Other Visit Diagnoses     Sedative, hypnotic, or anxiolytic use, unspecified, uncomplicated        Relevant Orders    Toxicology screen, urine    Sedative, hypnotic or anxiolytic abuse with intoxication, uncomplicated ([facility] [address] )         Relevant Orders    Toxicology screen, urine                 Diagnoses and all orders for this visit:    Acquired hypothyroidism    Bilateral pulmonary embolism (HCC)    Anxiety    Prediabetes  -     metFORMIN (GLUCOPHAGE) 500 mg tablet;  Take 1 tablet (500 mg total) by mouth daily  -     Hemoglobin A1C; Future  -     Basic metabolic panel; Future    Lung nodule  -     CT lung nodule follow-up; Future  -     Ambulatory referral to Pulmonology; Future    Sedative, hypnotic, or anxiolytic use, unspecified, uncomplicated  -     Toxicology screen, urine    Sedative, hypnotic or anxiolytic abuse with intoxication, uncomplicated ([address] )   -     Toxicology screen, urine    Class 2 severe obesity due to excess calories with serious comorbidity and body mass index (BMI) of 37 0 to 37 9 in adult [facility])              Subjective:      Patient ID: Marilee Mendoza is a 73 y o  male  CC:    Chief Complaint   Patient presents with    Follow-up    Anxiety       HPI:    Pt presents for follow-up-  Anxiety - Pt states mood is better  He is taking alprazolam 2mg TID  Pt is also taking celexa 40mg however states he does not see a difference since dosage was increased  Hypothyroidism - labs stable  Continue levothyroxine 125mcg    Prediabetes - pt's most current A1C is 6 1  Pt admits he does not exercise and is thinking about walking daily with wife and daughter  Pt is also trying to eat healthier    Pulmonary emboli - Stable  Pt is taking eliquis 5mg BID       The following portions of the patient's history were reviewed and updated as appropriate: allergies, past family history, past medical history, past social history, past surgical history and problem list       Review of Systems   Constitutional: Negative for diaphoresis, fatigue and unexpected weight change  HENT: Negative for congestion, postnasal drip, sore throat and trouble swallowing  Eyes: Negative for visual disturbance  Respiratory: Negative for cough, chest tightness and shortness of breath  Cardiovascular: Negative for chest pain, palpitations and leg swelling  Gastrointestinal: Negative for abdominal pain and constipation  Endocrine: Negative for polydipsia, polyphagia and polyuria  Genitourinary: Negative for difficulty urinating     Musculoskeletal: Negative for arthralgias, back pain and myalgias  Skin: Negative for pallor and rash  Neurological: Negative for dizziness, tremors, weakness, light-headedness and headaches  Psychiatric/Behavioral: Negative for agitation, confusion, decreased concentration, sleep disturbance and suicidal ideas  The patient is not nervous/anxious  Data to review:       Objective:    Vitals:    05/12/21 1423   BP: 118/62   BP Location: Left arm   Patient Position: Sitting   Pulse: 84   Temp: (!) 97 1 °F (36 2 °C)   Weight: 134 kg (296 lb 3 2 oz)   Height: 6' 1" (1 854 m)        Physical Exam  Vitals signs and nursing note reviewed  Constitutional:       Appearance: He is not ill-appearing  HENT:      Head: Normocephalic and atraumatic  Right Ear: Tympanic membrane normal       Left Ear: Tympanic membrane normal       Nose: Nose normal       Mouth/Throat:      Mouth: Mucous membranes are moist    Eyes:      Extraocular Movements: Extraocular movements intact  Conjunctiva/sclera: Conjunctivae normal    Neck:      Thyroid: No thyromegaly  Vascular: No carotid bruit or JVD  Cardiovascular:      Rate and Rhythm: Normal rate and regular rhythm  Pulses: Normal pulses  Carotid pulses are 2+ on the right side and 2+ on the left side  Heart sounds: Normal heart sounds, S1 normal and S2 normal  No murmur  Pulmonary:      Effort: Pulmonary effort is normal  No respiratory distress  Breath sounds: Normal breath sounds and air entry  No wheezing  Abdominal:      General: Bowel sounds are normal  There is no distension  Palpations: Abdomen is soft  Musculoskeletal: Normal range of motion  Right lower leg: No edema  Left lower leg: No edema  Skin:     Coloration: Skin is not pale  Findings: No erythema  Neurological:      Mental Status: He is alert and oriented to person, place, and time  Psychiatric:         Mood and Affect: Mood and affect normal  Mood is not anxious  Speech: Speech normal          Behavior: Behavior normal  Behavior is cooperative  Thought Content: Thought content normal          Cognition and Memory: Cognition normal          Judgment: Judgment normal            BMI Counseling: Body mass index is 39 08 kg/m²  The BMI is above normal  Nutrition recommendations include decreasing portion sizes, moderation in carbohydrate intake, reducing intake of saturated and trans fat and reducing intake of cholesterol  Exercise recommendations include moderate physical activity 150 minutes/week and strength training exercises

## 2021-05-18 LAB
AMPHETAMINES UR QL SCN: NEGATIVE NG/ML
BARBITURATES UR QL SCN: NEGATIVE NG/ML
BENZODIAZ UR QL: POSITIVE
BZE UR QL: NEGATIVE NG/ML
CANNABINOIDS UR QL SCN: NEGATIVE NG/ML
METHADONE UR QL SCN: NEGATIVE NG/ML
OPIATES UR QL: NEGATIVE NG/ML
PCP UR QL: NEGATIVE NG/ML
PROPOXYPH UR QL SCN: NEGATIVE NG/ML

## 2021-06-15 ENCOUNTER — HOSPITAL ENCOUNTER (OUTPATIENT)
Dept: CT IMAGING | Facility: HOSPITAL | Age: 71
Discharge: HOME | End: 2021-06-15
Payer: COMMERCIAL

## 2021-06-15 DIAGNOSIS — R91.1 LUNG NODULE: ICD-10-CM

## 2021-06-15 PROCEDURE — G1004 CDSM NDSC: HCPCS

## 2021-06-15 PROCEDURE — 71250 CT THORAX DX C-: CPT

## 2021-06-16 ENCOUNTER — CONSULT (OUTPATIENT)
Dept: GASTROENTEROLOGY | Facility: CLINIC | Age: 71
End: 2021-06-16
Payer: COMMERCIAL

## 2021-06-16 VITALS
BODY MASS INDEX: 38.83 KG/M2 | DIASTOLIC BLOOD PRESSURE: 78 MMHG | SYSTOLIC BLOOD PRESSURE: 130 MMHG | HEIGHT: 73 IN | HEART RATE: 68 BPM | WEIGHT: 293 LBS

## 2021-06-16 DIAGNOSIS — Z86.711 HISTORY OF PULMONARY EMBOLISM: Primary | ICD-10-CM

## 2021-06-16 DIAGNOSIS — R19.5 POSITIVE COLORECTAL CANCER SCREENING USING COLOGUARD TEST: ICD-10-CM

## 2021-06-16 PROCEDURE — 99204 OFFICE O/P NEW MOD 45 MIN: CPT | Performed by: INTERNAL MEDICINE

## 2021-06-16 PROCEDURE — 1036F TOBACCO NON-USER: CPT | Performed by: INTERNAL MEDICINE

## 2021-06-16 NOTE — PROGRESS NOTES
[facility] - Outpatient Consultation  Trevor Jones 73 y o  male MRN: [id number]  Encounter: [id number]      ASSESSMENT AND PLAN:      1  Positive colorectal cancer screening using Cologuard test  - Ambulatory referral to Gastroenterology  - Colonoscopy; Future    2  History of pulmonary embolism    Stop eliquis in preparation for colonoscopy    Schedule colon with a bowel prep    He will be leaving soon and back on July 17 2021 and we will schedule the colon after that    ______________________________________________________________________    HPI:   This 65-year-old male comes to the office today for a evaluation of positive Cologuard  The patient denies any rectal bleeding or abdominal pain  The patient does take Eliquis for history of pulmonary embolus which occurred in 2019  There is no family history for colon cancer but there is a family history for a father with colon polyp  Patient states he has never undergone a colonoscopy in the past  Although it had been recommended  Patient denies any abdominal pain, nausea, vomiting but he does admit to problems with intermittent diarrhea and constipation  He states that these bowel changes have occurred since he was started on metformin therapy  He takes Eliquis on a daily basis  He denies any bloating or gaseousness  There has been no heartburn or dysphagia  REVIEW OF SYSTEMS:    CONSTITUTIONAL: Denies any fever, chills, rigors, and weight loss  HEENT: No earache or tinnitus  Denies hearing loss or visual disturbances  CARDIOVASCULAR: No chest pain or palpitations  RESPIRATORY: Denies any cough, hemoptysis, shortness of breath or dyspnea on exertion  GASTROINTESTINAL: As noted in the History of Present Illness  GENITOURINARY: No problems with urination  Denies any hematuria or dysuria  NEUROLOGIC: No dizziness or vertigo, denies headaches  MUSCULOSKELETAL: Denies any muscle or joint pain     SKIN: Denies skin rashes or itching  ENDOCRINE: Denies excessive thirst  Denies intolerance to heat or cold  PSYCHOSOCIAL: Denies depression or anxiety  Denies any recent memory loss  Historical Information   Past Medical History:   Diagnosis Date    Back injury     Chronic back pain     Depression 2016    Thyroid disease      Past Surgical History:   Procedure Laterality Date    KNEE SURGERY      TONSILLECTOMY       Social History   Social History     Substance and Sexual Activity   Alcohol Use Yes    Comment: occasional  1 drink/wk      Social History     Substance and Sexual Activity   Drug Use Yes    Frequency: 14 0 times per week    Types: Marijuana    Comment: medical marijuana since november     Social History     Tobacco Use   Smoking Status Former Smoker    Packs/day: 0 50    Years: 35 00    Pack years: 17 50    Types: Cigarettes    Quit date:     Years since quittin 4   Smokeless Tobacco Never Used     Family History   Problem Relation Age of Onset    Hypothyroidism Mother     Mental illness Mother         disorder    Stroke Mother     Hypothyroidism Sister     Cancer Sister        Meds/Allergies       Current Outpatient Medications:     ALPRAZolam (XANAX) 2 MG tablet    apixaban (ELIQUIS) 5 mg    citalopram (CeleXA) 40 mg tablet    Influenza Virus Vacc Split PF 0 5 ML SUSI    levothyroxine 125 mcg tablet    metFORMIN (GLUCOPHAGE) 500 mg tablet    No Known Allergies        Objective     Blood pressure 130/78, pulse 68, height 6' 1" (1 854 m), weight 133 kg (293 lb)  Body mass index is 38 66 kg/m²  PHYSICAL EXAM:      General Appearance:   Alert, cooperative, no distress   HEENT:   Normocephalic, atraumatic, anicteric      Neck:  Supple, symmetrical, trachea midline   Lungs:   Clear to auscultation bilaterally; no rales, rhonchi or wheezing; respirations unlabored    Heart[de-identified]   Regular rate and rhythm; no murmur, rub, or gallop     Abdomen:   Soft, non-tender, non-distended; normal bowel sounds; no masses, no organomegaly    Genitalia:   Deferred    Rectal:   Deferred    Extremities:  No cyanosis, clubbing or edema    Pulses:  2+ and symmetric    Skin:  No jaundice, rashes, or lesions    Lymph nodes:  No palpable cervical lymphadenopathy        Lab Results:   No visits with results within 1 Day(s) from this visit  Latest known visit with results is:   Office Visit on 05/12/2021   Component Date Value    Amphetamine Screen, Ur 05/12/2021 Negative     Barbiturate Screen, Ur 05/12/2021 Negative     Cannabinoid Scrn, Ur 05/12/2021 Negative     Methadone Screen, Urine 05/12/2021 Negative     Opiate Scrn, Ur 05/12/2021 Negative     Phencyclidine (PCP), Bethany Newman* 05/12/2021 Negative     Benzodiazepines 05/12/2021 Positive*    Cocaine (Metab ) Urine 05/12/2021 Negative     Propoxyphene Screen, Goran Lundy* 05/12/2021 Negative          Radiology Results:   No results found

## 2021-07-01 ENCOUNTER — CONSULT (OUTPATIENT)
Dept: PULMONOLOGY | Facility: CLINIC | Age: 71
End: 2021-07-01
Payer: COMMERCIAL

## 2021-07-01 ENCOUNTER — APPOINTMENT (OUTPATIENT)
Dept: LAB | Facility: CLINIC | Age: 71
End: 2021-07-01
Payer: COMMERCIAL

## 2021-07-01 VITALS
BODY MASS INDEX: 38.83 KG/M2 | WEIGHT: 293 LBS | DIASTOLIC BLOOD PRESSURE: 84 MMHG | HEART RATE: 73 BPM | HEIGHT: 73 IN | OXYGEN SATURATION: 93 % | SYSTOLIC BLOOD PRESSURE: 120 MMHG

## 2021-07-01 DIAGNOSIS — R91.1 LUNG NODULE: ICD-10-CM

## 2021-07-01 DIAGNOSIS — R06.02 SOB (SHORTNESS OF BREATH) ON EXERTION: Primary | ICD-10-CM

## 2021-07-01 DIAGNOSIS — R93.89 ABNORMAL CT OF THE CHEST: ICD-10-CM

## 2021-07-01 DIAGNOSIS — Z87.891 FORMER SMOKER: ICD-10-CM

## 2021-07-01 DIAGNOSIS — R06.02 SOB (SHORTNESS OF BREATH) ON EXERTION: ICD-10-CM

## 2021-07-01 PROCEDURE — 86671 FUNGUS NES ANTIBODY: CPT

## 2021-07-01 PROCEDURE — 86331 IMMUNODIFFUSION OUCHTERLONY: CPT

## 2021-07-01 PROCEDURE — 86003 ALLG SPEC IGE CRUDE XTRC EA: CPT

## 2021-07-01 PROCEDURE — 1036F TOBACCO NON-USER: CPT | Performed by: INTERNAL MEDICINE

## 2021-07-01 PROCEDURE — 82785 ASSAY OF IGE: CPT

## 2021-07-01 PROCEDURE — 36415 COLL VENOUS BLD VENIPUNCTURE: CPT

## 2021-07-01 PROCEDURE — 3008F BODY MASS INDEX DOCD: CPT | Performed by: INTERNAL MEDICINE

## 2021-07-01 PROCEDURE — 86602 ANTINOMYCES ANTIBODY: CPT

## 2021-07-01 PROCEDURE — 99204 OFFICE O/P NEW MOD 45 MIN: CPT | Performed by: INTERNAL MEDICINE

## 2021-07-01 PROCEDURE — 86606 ASPERGILLUS ANTIBODY: CPT

## 2021-07-01 NOTE — PROGRESS NOTES
Assessment/Plan:     Diagnoses and all orders for this visit:    SOB (shortness of breath) on exertion  -     [facility] Allergy Panel, Adult; Future  -     Hypersensitivity pnuemonitis profile; Future  -     Complete PFT with post bronchodilator; Future    Lung nodule  -     Ambulatory referral to Pulmonology    Abnormal CT of the chest  -     CT chest without contrast; Future    Former smoker         shortness of breath only on an incline, there is no worsening in the past few years he states  There is no current limitation of his daily current activities  Will get complete PFT with post bronchodilator and follow-up to see if there is any evidence of any obstructive lung disease  Also had history of pulmonary embolism in 2018, reviewed echocardiogram from 2019 there is no significant pulmonary arterial hypertension that has described, although the right ventricle is mildly dilated and also has grade 1 diastolic dysfunction  Discussed after the above testing we will repeat the echocardiogram is needed  Reviewed CT of the chest report and images with the patient with the minimal ground-glass opacities, in the right lower lobe  Which is new from the prior CT of the chest in 2019 infectious versus inflammatory etiologies discussed with the patient   He does not have to seem to have any signs of infection currently no fever no cough no recent sick contacts  Has had his COVID-19 vaccine in March 2021   Will get respiratory allergy panel and hypersensitivity pneumonitis panel and follow-up    also discussed regarding repeating the CT of the chest in 3 months from now for stability and resolution of these ground-glass opacities  Regarding his history of pulmonary embolism diagnosed about 3 years ago in [place], he has been continuing with his Eliquis no signs of any bleeding currently    Will see him back after the above testing or p r n  earlier as needed    Return in about 3 months (around 10/1/2021)  All questions are answered to the patient's satisfaction and understanding  He verbalizes understanding  He is encouraged to call with any further questions or concerns  Portions of the record may have been created with voice recognition software  Occasional wrong word or "sound a like" substitutions may have occurred due to the inherent limitations of voice recognition software  Read the chart carefully and recognize, using context, where substitutions have occurred  a    Electronically Signed by Alayna Larios MD    ______________________________________________________________________    Chief Complaint:   Chief Complaint   Patient presents with    Shortness of Breath    Pulmonary Embolism        Patient ID: Emily Laws is a 73 y o  y o  male has a past medical history of Back injury, Chronic back pain, Depression (9/16/2016), Pulmonary embolism ([facility] [address] ), SOB (shortness of breath), and Thyroid disease  7/1/2021  Patient presents today for initial visit  Gene  is a very pleasant 47-year-old gentleman former smoker of about 25-30 pack-year smoking history quit in 2001 he states he was a  here he was between here in [place] as well as in [place]  He had a pulmonary embolism in 2018 that was diagnosed when he was in [place], he states it might be because he was traveling then long distance from here driving to [place], but he was not told how long to continue on the treatment he and he states he has been still continuing the Eliquis currently  He recently established here with the [facility] primary care who had reviewed his prior CT scans thought to be having if nodule which needed follow-up for which a CT was ordered and given ground-glass opacities has been referred  Currently does not complain of any cough shortness of breath or wheezing    He does have significant environmental allergens      Occupational/Exposure history:  Pets/Enviroment:3 dogs  Travel history: travels to [place], last there in march 2021, and going again inJuly 2021  Review of Systems   Constitutional: Negative  HENT: Negative  Eyes: Negative  Respiratory: Positive for shortness of breath ( only on an incline)  Cardiovascular: Negative  Gastrointestinal: Negative  Endocrine: Negative  Genitourinary: Negative  Musculoskeletal: Negative  Allergic/Immunologic: Positive for environmental allergies  Neurological: Negative  Hematological: Negative  Psychiatric/Behavioral: Negative  Social history: He reports that he quit smoking about 20 years ago  His smoking use included cigarettes  He started smoking about 64 years ago  He has a 17 50 pack-year smoking history  He has never used smokeless tobacco  He reports current alcohol use  He reports current drug use  Frequency: 14 00 times per week  Drug: Marijuana      Past surgical history:   Past Surgical History:   Procedure Laterality Date    KNEE SURGERY      TONSILLECTOMY       Family history:   Family History   Problem Relation Age of Onset    Hypothyroidism Mother     Mental illness Mother         disorder    Stroke Mother     Hypothyroidism Sister     Cancer Sister        Immunization History   Administered Date(s) Administered    INFLUENZA 11/15/2019    Influenza Split High Dose Preservative Free IM 09/04/2019    Influenza, high dose seasonal 0 7 mL 09/16/2020    Influenza, seasonal, injectable 09/30/2014, 10/08/2015    Pneumococcal 11/15/2019    Pneumococcal Conjugate 13-Valent 08/22/2018    Pneumococcal Polysaccharide PPV23 09/03/2019    SARS-CoV-2 / COVID-19 mRNA IM (Pfizer-BioNTech) 03/17/2021, 04/07/2021     Current Outpatient Medications   Medication Sig Dispense Refill    ALPRAZolam (XANAX) 2 MG tablet Take 1 tablet (2 mg total) by mouth 3 (three) times a day as needed for anxiety 270 tablet 0    apixaban (ELIQUIS) 5 mg Take 1 tablet (5 mg total) by mouth 2 (two) times a day 180 tablet 1    citalopram (CeleXA) 40 mg tablet Take 1 tablet (40 mg total) by mouth daily 90 tablet 1    Influenza Virus Vacc Split PF 0 5 ML SUSI Tauria [id number](PF) 45 mcg (15 mcg x 3)/0 5 mL intramuscular syringe   inject 0 5 milliliter intramuscularly      levothyroxine 125 mcg tablet Take 1 tablet (125 mcg total) by mouth daily in the early morning 90 tablet 3    metFORMIN (GLUCOPHAGE) 500 mg tablet Take 1 tablet (500 mg total) by mouth daily 90 tablet 1     No current facility-administered medications for this visit  Allergies: Patient has no known allergies  Objective:  Vitals:    07/01/21 1057   BP: 120/84   Pulse: 73   SpO2: 93%   Weight: 133 kg (293 lb)   Height: 6' 1" (1 854 m)   Oxygen Therapy  SpO2: 93 %    Wt Readings from Last 3 Encounters:   07/01/21 133 kg (293 lb)   06/16/21 133 kg (293 lb)   05/12/21 134 kg (296 lb 3 2 oz)     Body mass index is 38 66 kg/m²  Physical Exam  Vitals and nursing note reviewed  Constitutional:       Appearance: He is well-developed  HENT:      Head: Normocephalic and atraumatic  Eyes:      Conjunctiva/sclera: Conjunctivae normal       Pupils: Pupils are equal, round, and reactive to light  Neck:      Thyroid: No thyromegaly  Vascular: No JVD  Cardiovascular:      Rate and Rhythm: Normal rate and regular rhythm  Heart sounds: Normal heart sounds  No murmur heard  No friction rub  No gallop  Pulmonary:      Effort: Pulmonary effort is normal  No respiratory distress  Breath sounds: Normal breath sounds  No wheezing or rales  Chest:      Chest wall: No tenderness  Abdominal:      General: Bowel sounds are normal       Palpations: Abdomen is soft  Musculoskeletal:         General: No tenderness or deformity  Normal range of motion  Cervical back: Normal range of motion and neck supple  Lymphadenopathy:      Cervical: No cervical adenopathy  Skin:     General: Skin is warm and dry     Neurological:      Mental Status: He is alert and oriented to person, place, and time  Diagnostics:  I have personally reviewed pertinent films in PACS    CT lung nodule follow-up    Result Date: 6/23/2021  Narrative: CT CHEST WITHOUT IV CONTRAST INDICATION:   R91 1: Solitary pulmonary nodule  COMPARISON:  1/28/2020, 12/27/2019 TECHNIQUE:  Unenhanced CT examination of the chest was performed utilizing a low radiation dose protocol  Axial, sagittal, and coronal 2D reformatted images were created from the source data and submitted for interpretation  Radiation dose length product (DLP) for this visit:  451 mGy-cm   This examination, like all CT scans performed in the [facility], was performed utilizing techniques to minimize radiation dose exposure, including the use of iterative reconstruction and automated exposure control  FINDINGS: LUNGS:  There is minimal scarring in the right costophrenic angle corresponding to the nodular density seen on the prior CT from 12/27/2019  There is mild groundglass density along the posterior and medial right lower lobe  This was not present previously and is of uncertain etiology, possibly atelectasis or inflammatory change  There is stable density along the lateral lingula  There is no tracheal or endobronchial lesion  PLEURA:  Unremarkable  HEART/GREAT VESSELS:  Unremarkable for patient's age  MEDIASTINUM AND JASON:  Unremarkable  CHEST WALL AND LOWER NECK:   Unremarkable  VISUALIZED STRUCTURES IN THE UPPER ABDOMEN:  Stable left renal cyst, partially imaged OSSEOUS STRUCTURES:  No acute fracture or destructive osseous lesion  Impression: 1  Minimal scarring from previous right lower lobe lung nodule, likely inflammatory  No new or enlarging nodules  2   New mild groundglass density along the medial and posterior right lower lobe  This is of uncertain etiology though is likely atelectasis or mild inflammation   Workstation performed: [id number]

## 2021-07-05 LAB
A ALTERNATA IGE QN: <0.1 KUA/I
A FUMIGATUS IGE QN: <0.1 KUA/I
ALLERGEN COMMENT: ABNORMAL
BERMUDA GRASS IGE QN: <0.1 KUA/I
BOXELDER IGE QN: <0.1 KUA/I
C HERBARUM IGE QN: <0.1 KUA/I
CAT DANDER IGE QN: <0.1 KUA/I
CMN PIGWEED IGE QN: <0.1 KUA/I
COMMON RAGWEED IGE QN: <0.1 KUA/I
COTTONWOOD IGE QN: <0.1 KUA/I
D FARINAE IGE QN: <0.1 KUA/I
D PTERONYSS IGE QN: <0.1 KUA/I
DOG DANDER IGE QN: <0.1 KUA/I
LONDON PLANE IGE QN: <0.1 KUA/I
MOUSE URINE PROT IGE QN: <0.1 KUA/I
MT JUNIPER IGE QN: <0.1 KUA/I
MUGWORT IGE QN: <0.1 KUA/I
P NOTATUM IGE QN: <0.1 KUA/I
ROACH IGE QN: <0.1 KUA/I
SHEEP SORREL IGE QN: <0.1 KUA/I
SILVER BIRCH IGE QN: 10.7 KUA/I
TIMOTHY IGE QN: <0.1 KUA/I
TOTAL IGE SMQN RAST: 41.3 KU/L (ref 0–113)
WALNUT IGE QN: <0.1 KUA/I
WHITE ASH IGE QN: <0.1 KUA/I
WHITE ELM IGE QN: <0.1 KUA/I
WHITE MULBERRY IGE QN: <0.1 KUA/I
WHITE OAK IGE QN: 0.51 KUA/I

## 2021-07-09 LAB
A FUMIGATUS1 AB SER QL ID: NEGATIVE
A PULLULANS AB SER QL: NEGATIVE
LACEYELLA SACCHARI AB SER QL: NEGATIVE
PIGEON SERUM AB QL ID: NEGATIVE
S RECTIVIRGULA AB SER QL ID: NEGATIVE
T VULGARIS AB SER QL ID: NEGATIVE

## 2021-07-21 ENCOUNTER — TELEPHONE (OUTPATIENT)
Dept: GASTROENTEROLOGY | Facility: HOSPITAL | Age: 71
End: 2021-07-21

## 2021-07-22 ENCOUNTER — HOSPITAL ENCOUNTER (OUTPATIENT)
Dept: GASTROENTEROLOGY | Facility: HOSPITAL | Age: 71
Setting detail: OUTPATIENT SURGERY
Discharge: HOME | End: 2021-07-22
Attending: INTERNAL MEDICINE
Payer: COMMERCIAL

## 2021-07-22 ENCOUNTER — ANESTHESIA EVENT (OUTPATIENT)
Dept: GASTROENTEROLOGY | Facility: HOSPITAL | Age: 71
End: 2021-07-22

## 2021-07-22 ENCOUNTER — ANESTHESIA (OUTPATIENT)
Dept: GASTROENTEROLOGY | Facility: HOSPITAL | Age: 71
End: 2021-07-22

## 2021-07-22 VITALS
BODY MASS INDEX: 36.75 KG/M2 | DIASTOLIC BLOOD PRESSURE: 82 MMHG | WEIGHT: 286.38 LBS | OXYGEN SATURATION: 94 % | RESPIRATION RATE: 13 BRPM | TEMPERATURE: 97.6 F | SYSTOLIC BLOOD PRESSURE: 116 MMHG | HEART RATE: 84 BPM | HEIGHT: 74 IN

## 2021-07-22 DIAGNOSIS — R19.5 POSITIVE COLORECTAL CANCER SCREENING USING COLOGUARD TEST: ICD-10-CM

## 2021-07-22 LAB — GLUCOSE SERPL-MCNC: 113 MG/DL (ref 65–140)

## 2021-07-22 PROCEDURE — 88305 TISSUE EXAM BY PATHOLOGIST: CPT | Performed by: PATHOLOGY

## 2021-07-22 PROCEDURE — 82948 REAGENT STRIP/BLOOD GLUCOSE: CPT

## 2021-07-22 PROCEDURE — 45385 COLONOSCOPY W/LESION REMOVAL: CPT | Performed by: INTERNAL MEDICINE

## 2021-07-22 RX ORDER — LIDOCAINE HYDROCHLORIDE 10 MG/ML
INJECTION, SOLUTION EPIDURAL; INFILTRATION; INTRACAUDAL; PERINEURAL AS NEEDED
Status: DISCONTINUED | OUTPATIENT
Start: 2021-07-22 | End: 2021-07-22

## 2021-07-22 RX ORDER — PROPOFOL 10 MG/ML
INJECTION, EMULSION INTRAVENOUS AS NEEDED
Status: DISCONTINUED | OUTPATIENT
Start: 2021-07-22 | End: 2021-07-22

## 2021-07-22 RX ORDER — SODIUM CHLORIDE, SODIUM LACTATE, POTASSIUM CHLORIDE, CALCIUM CHLORIDE 600; 310; 30; 20 MG/100ML; MG/100ML; MG/100ML; MG/100ML
125 INJECTION, SOLUTION INTRAVENOUS CONTINUOUS
Status: DISCONTINUED | OUTPATIENT
Start: 2021-07-22 | End: 2021-07-26 | Stop reason: HOSPADM

## 2021-07-22 RX ORDER — GLYCOPYRROLATE 0.2 MG/ML
INJECTION INTRAMUSCULAR; INTRAVENOUS AS NEEDED
Status: DISCONTINUED | OUTPATIENT
Start: 2021-07-22 | End: 2021-07-22

## 2021-07-22 RX ADMIN — SODIUM CHLORIDE, SODIUM LACTATE, POTASSIUM CHLORIDE, AND CALCIUM CHLORIDE 125 ML/HR: .6; .31; .03; .02 INJECTION, SOLUTION INTRAVENOUS at 08:36

## 2021-07-22 RX ADMIN — PROPOFOL 20 MG: 10 INJECTION, EMULSION INTRAVENOUS at 09:19

## 2021-07-22 RX ADMIN — PROPOFOL 20 MG: 10 INJECTION, EMULSION INTRAVENOUS at 09:24

## 2021-07-22 RX ADMIN — PROPOFOL 20 MG: 10 INJECTION, EMULSION INTRAVENOUS at 09:15

## 2021-07-22 RX ADMIN — PROPOFOL 100 MG: 10 INJECTION, EMULSION INTRAVENOUS at 09:12

## 2021-07-22 RX ADMIN — PROPOFOL 20 MG: 10 INJECTION, EMULSION INTRAVENOUS at 09:30

## 2021-07-22 RX ADMIN — LIDOCAINE HYDROCHLORIDE 50 MG: 10 INJECTION, SOLUTION EPIDURAL; INFILTRATION; INTRACAUDAL at 09:12

## 2021-07-22 RX ADMIN — PROPOFOL 20 MG: 10 INJECTION, EMULSION INTRAVENOUS at 09:17

## 2021-07-22 RX ADMIN — PROPOFOL 20 MG: 10 INJECTION, EMULSION INTRAVENOUS at 09:13

## 2021-07-22 RX ADMIN — PROPOFOL 20 MG: 10 INJECTION, EMULSION INTRAVENOUS at 09:21

## 2021-07-22 RX ADMIN — GLYCOPYRROLATE 0.2 MG: 0.2 INJECTION, SOLUTION INTRAMUSCULAR; INTRAVENOUS at 09:19

## 2021-07-22 NOTE — ANESTHESIA PREPROCEDURE EVALUATION
Procedure:  COLONOSCOPY    Relevant Problems   ENDO   (+) Acquired hypothyroidism      MUSCULOSKELETAL   (+) Acute bilateral low back pain with bilateral sciatica      NEURO/PSYCH   (+) Anxiety   (+) Chronic left shoulder pain   (+) Depression        Physical Exam    Airway    Mallampati score: III  TM Distance: >3 FB  Neck ROM: full     Dental       Cardiovascular  Cardiovascular exam normal    Pulmonary  Pulmonary exam normal     Other Findings       Acquired hypothyroidism   Acute bilateral low back pain with bilateral sciatica   Anxiety   Chronic left shoulder pain   Depression   Erectile dysfunction   Class 2 severe obesity due to excess calories with serious comorbidity and body mass index (BMI) of 37 0 to 37 9 in [facility])   Bilateral pulmonary embolism (HCC)   Lung nodule   Prediabetes   Elevated blood pressure reading   Bilateral impacted cerumen         Anesthesia Plan  ASA Score- 3     Anesthesia Type- IV sedation with anesthesia with ASA Monitors  Additional Monitors:   Airway Plan:           Plan Factors-    Chart reviewed  Existing labs reviewed  Patient summary reviewed  Induction- intravenous  Postoperative Plan-     Informed Consent- Anesthetic plan and risks discussed with patient  I personally reviewed this patient with the CRNA  Discussed and agreed on the Anesthesia Plan with the CRNA  Halley Mena

## 2021-07-22 NOTE — ANESTHESIA POSTPROCEDURE EVALUATION
Post-Op Assessment Note    CV Status:  Stable  Pain Score: 0    Pain management: adequate     Mental Status:  Sleepy and arousable   Hydration Status:  Stable   PONV Controlled:  None   Airway Patency:  Patent and adequate      Post Op Vitals Reviewed: Yes      Staff: CRNA         No complications documented      BP   119/82   Temp   97 6   Pulse  63   Resp   18   SpO2   93

## 2021-07-22 NOTE — DISCHARGE INSTRUCTIONS
Colonoscopy   WHAT YOU NEED TO KNOW:   A colonoscopy is a procedure to examine the inside of your colon (intestine) with a scope  Polyps or tissue growths may have been removed during your colonoscopy  It is normal to feel bloated and to have some abdominal discomfort  You should be passing gas  If you have hemorrhoids or you had polyps removed, you may have a small amount of bleeding  DISCHARGE INSTRUCTIONS:   Seek care immediately if:    You have sudden, severe abdominal pain   You have problems swallowing   You have a large amount of black, sticky bowel movements or blood in your bowel movements   You have sudden trouble breathing   You feel weak, lightheaded, or faint or your heart beats faster than normal for you  Contact your healthcare provider if:    You have a fever and chills   You have nausea or are vomiting   Your abdomen is bloated or feels full and hard   You have abdominal pain   You have black, sticky bowel movements or blood in your bowel movements   You have not had a bowel movement for 3 days after your procedure   You have rash or hives   You have questions or concerns about your procedure  Activity:    Do not lift, strain, or run for 24 hours after your procedure   Rest after your procedure  You have been given medicine to relax you  Do not drive or make important decisions until the day after your procedure  Return to your normal activity as directed   Relieve gas and discomfort from bloating by lying on your right side with a heating pad on your abdomen  You may need to take short walks to help the gas move out  Eat small meals until bloating is relieved  Follow up with your healthcare provider as directed: Write down your questions so you remember to ask them during your visits  If you take a blood thinner, please review the specific instructions from your endoscopist about when you should resume it   These can be found in the Recommendation and Your Medication list sections of this After Visit Summary  Colonoscopy   WHAT YOU NEED TO KNOW:   A colonoscopy is a procedure to examine the inside of your colon (intestine) with a scope  Polyps or tissue growths may have been removed during your colonoscopy  It is normal to feel bloated and to have some abdominal discomfort  You should be passing gas  If you have hemorrhoids or you had polyps removed, you may have a small amount of bleeding  DISCHARGE INSTRUCTIONS:   Seek care immediately if:   · You have a large amount of bright red blood in your bowel movements  · Your abdomen is hard and firm and you have severe pain  · You have sudden trouble breathing  Contact your healthcare provider if:   · You develop a rash or hives  · You have a fever within 24 hours of your procedure       · You have not had a bowel movement for 3 days after your procedure  · You have questions or concerns about your condition or care  Activity:   · Do not lift, strain, or run  for 3 days after your procedure  · Rest after your procedure  You have been given medicine to relax you  Do not  drive or make important decisions until the day after your procedure  Return to your normal activity as directed  · Relieve gas and discomfort from bloating  by lying on your right side with a heating pad on your abdomen  You may need to take short walks to help the gas move out  Eat small meals until bloating is relieved  If you had polyps removed: For 7 days after your procedure:  · Do not  take aspirin  · Do not  go on long car rides  Follow up with your healthcare provider as directed:  Write down your questions so you remember to ask them during your visits  © 2017 [address] [employer] is for End User's use only and may not be sold, redistributed or otherwise used for commercial purposes   All illustrations and images included in CareNotes® are the copyrighted property of [employer]  or Brigido Oswald  The above information is an  only  It is not intended as medical advice for individual conditions or treatments  Talk to your doctor, nurse or pharmacist before following any medical regimen to see if it is safe and effective for you

## 2021-07-30 ENCOUNTER — TELEPHONE (OUTPATIENT)
Dept: GASTROENTEROLOGY | Facility: CLINIC | Age: 71
End: 2021-07-30

## 2021-07-30 NOTE — TELEPHONE ENCOUNTER
----- Message from Chris Ernst DO sent at 7/29/2021 12:37 PM EDT -----  Please call the patient with a polyp results  The polyp was a tubular adenoma which is precancerous polyp incompletely removed    The patient is due for repeat colonoscopy in 5 years

## 2021-08-23 DIAGNOSIS — E03.9 ACQUIRED HYPOTHYROIDISM: ICD-10-CM

## 2021-08-23 DIAGNOSIS — F41.9 ANXIETY: ICD-10-CM

## 2021-08-23 DIAGNOSIS — R73.03 PREDIABETES: ICD-10-CM

## 2021-08-23 DIAGNOSIS — I26.99 BILATERAL PULMONARY EMBOLISM (HCC): ICD-10-CM

## 2021-08-24 RX ORDER — LEVOTHYROXINE SODIUM 0.12 MG/1
125 TABLET ORAL
Qty: 90 TABLET | Refills: 0 | Status: SHIPPED | OUTPATIENT
Start: 2021-08-24 | End: 2021-11-29 | Stop reason: SDUPTHER

## 2021-08-24 RX ORDER — ALPRAZOLAM 2 MG/1
2 TABLET ORAL 3 TIMES DAILY PRN
Qty: 270 TABLET | Refills: 0 | Status: SHIPPED | OUTPATIENT
Start: 2021-08-24 | End: 2022-06-16 | Stop reason: SDUPTHER

## 2021-08-24 RX ORDER — CITALOPRAM 40 MG/1
40 TABLET ORAL DAILY
Qty: 90 TABLET | Refills: 0 | Status: SHIPPED | OUTPATIENT
Start: 2021-08-24 | End: 2021-11-29 | Stop reason: SDUPTHER

## 2021-10-08 ENCOUNTER — HOSPITAL ENCOUNTER (OUTPATIENT)
Dept: CT IMAGING | Facility: HOSPITAL | Age: 71
Discharge: HOME | End: 2021-10-08
Attending: INTERNAL MEDICINE
Payer: COMMERCIAL

## 2021-10-08 ENCOUNTER — HOSPITAL ENCOUNTER (OUTPATIENT)
Dept: PULMONOLOGY | Facility: HOSPITAL | Age: 71
Discharge: HOME | End: 2021-10-08
Attending: INTERNAL MEDICINE
Payer: COMMERCIAL

## 2021-10-08 DIAGNOSIS — R06.02 SOB (SHORTNESS OF BREATH) ON EXERTION: ICD-10-CM

## 2021-10-08 DIAGNOSIS — R93.89 ABNORMAL CT OF THE CHEST: ICD-10-CM

## 2021-10-08 PROCEDURE — 94760 N-INVAS EAR/PLS OXIMETRY 1: CPT

## 2021-10-08 PROCEDURE — 94060 EVALUATION OF WHEEZING: CPT | Performed by: INTERNAL MEDICINE

## 2021-10-08 PROCEDURE — 94729 DIFFUSING CAPACITY: CPT | Performed by: INTERNAL MEDICINE

## 2021-10-08 PROCEDURE — 94727 GAS DIL/WSHOT DETER LNG VOL: CPT

## 2021-10-08 PROCEDURE — 71250 CT THORAX DX C-: CPT

## 2021-10-08 PROCEDURE — 94727 GAS DIL/WSHOT DETER LNG VOL: CPT | Performed by: INTERNAL MEDICINE

## 2021-10-08 PROCEDURE — G1004 CDSM NDSC: HCPCS

## 2021-10-08 PROCEDURE — 94729 DIFFUSING CAPACITY: CPT

## 2021-10-08 PROCEDURE — 94060 EVALUATION OF WHEEZING: CPT

## 2021-10-08 RX ORDER — ALBUTEROL SULFATE 2.5 MG/3ML
2.5 SOLUTION RESPIRATORY (INHALATION) ONCE
Status: COMPLETED | OUTPATIENT
Start: 2021-10-08 | End: 2021-10-08

## 2021-10-08 RX ADMIN — ALBUTEROL SULFATE 2.5 MG: 2.5 SOLUTION RESPIRATORY (INHALATION) at 12:23

## 2021-10-12 ENCOUNTER — OFFICE VISIT (OUTPATIENT)
Dept: PULMONOLOGY | Facility: CLINIC | Age: 71
End: 2021-10-12
Payer: COMMERCIAL

## 2021-10-12 VITALS
SYSTOLIC BLOOD PRESSURE: 124 MMHG | HEART RATE: 78 BPM | OXYGEN SATURATION: 93 % | BODY MASS INDEX: 37.73 KG/M2 | TEMPERATURE: 97.7 F | WEIGHT: 294 LBS | HEIGHT: 74 IN | DIASTOLIC BLOOD PRESSURE: 84 MMHG

## 2021-10-12 DIAGNOSIS — R06.02 SOB (SHORTNESS OF BREATH) ON EXERTION: Primary | ICD-10-CM

## 2021-10-12 DIAGNOSIS — Z87.891 FORMER SMOKER: ICD-10-CM

## 2021-10-12 DIAGNOSIS — R91.1 LUNG NODULE: ICD-10-CM

## 2021-10-12 DIAGNOSIS — R93.89 ABNORMAL CT OF THE CHEST: ICD-10-CM

## 2021-10-12 PROCEDURE — 3008F BODY MASS INDEX DOCD: CPT | Performed by: INTERNAL MEDICINE

## 2021-10-12 PROCEDURE — 1160F RVW MEDS BY RX/DR IN RCRD: CPT | Performed by: INTERNAL MEDICINE

## 2021-10-12 PROCEDURE — 1036F TOBACCO NON-USER: CPT | Performed by: INTERNAL MEDICINE

## 2021-10-12 PROCEDURE — 99214 OFFICE O/P EST MOD 30 MIN: CPT | Performed by: INTERNAL MEDICINE

## 2021-11-19 ENCOUNTER — RA CDI HCC (OUTPATIENT)
Dept: OTHER | Facility: HOSPITAL | Age: 71
End: 2021-11-19

## 2021-11-29 ENCOUNTER — OFFICE VISIT (OUTPATIENT)
Dept: FAMILY MEDICINE CLINIC | Facility: CLINIC | Age: 71
End: 2021-11-29
Payer: COMMERCIAL

## 2021-11-29 VITALS
SYSTOLIC BLOOD PRESSURE: 128 MMHG | HEIGHT: 74 IN | TEMPERATURE: 96.1 F | WEIGHT: 296.13 LBS | BODY MASS INDEX: 38 KG/M2 | DIASTOLIC BLOOD PRESSURE: 74 MMHG

## 2021-11-29 DIAGNOSIS — F41.9 ANXIETY: ICD-10-CM

## 2021-11-29 DIAGNOSIS — Z00.00 MEDICARE ANNUAL WELLNESS VISIT, SUBSEQUENT: ICD-10-CM

## 2021-11-29 DIAGNOSIS — J01.90 ACUTE NON-RECURRENT SINUSITIS, UNSPECIFIED LOCATION: ICD-10-CM

## 2021-11-29 DIAGNOSIS — E03.9 ACQUIRED HYPOTHYROIDISM: Primary | ICD-10-CM

## 2021-11-29 DIAGNOSIS — M54.32 SCIATICA OF LEFT SIDE: ICD-10-CM

## 2021-11-29 DIAGNOSIS — I26.99 BILATERAL PULMONARY EMBOLISM (HCC): ICD-10-CM

## 2021-11-29 DIAGNOSIS — F33.41 RECURRENT MAJOR DEPRESSIVE DISORDER, IN PARTIAL REMISSION (HCC): ICD-10-CM

## 2021-11-29 DIAGNOSIS — M54.41 ACUTE BILATERAL LOW BACK PAIN WITH BILATERAL SCIATICA: ICD-10-CM

## 2021-11-29 DIAGNOSIS — Z12.5 SCREENING FOR PROSTATE CANCER: ICD-10-CM

## 2021-11-29 DIAGNOSIS — R73.03 PREDIABETES: ICD-10-CM

## 2021-11-29 DIAGNOSIS — M54.42 ACUTE BILATERAL LOW BACK PAIN WITH BILATERAL SCIATICA: ICD-10-CM

## 2021-11-29 PROCEDURE — 3008F BODY MASS INDEX DOCD: CPT | Performed by: INTERNAL MEDICINE

## 2021-11-29 PROCEDURE — 99214 OFFICE O/P EST MOD 30 MIN: CPT | Performed by: NURSE PRACTITIONER

## 2021-11-29 PROCEDURE — 1101F PT FALLS ASSESS-DOCD LE1/YR: CPT | Performed by: NURSE PRACTITIONER

## 2021-11-29 PROCEDURE — G0439 PPPS, SUBSEQ VISIT: HCPCS | Performed by: NURSE PRACTITIONER

## 2021-11-29 RX ORDER — CITALOPRAM 40 MG/1
40 TABLET ORAL DAILY
Qty: 90 TABLET | Refills: 0 | Status: SHIPPED | OUTPATIENT
Start: 2021-11-29 | End: 2022-02-21

## 2021-11-29 RX ORDER — GABAPENTIN 300 MG/1
300 CAPSULE ORAL 2 TIMES DAILY
Qty: 180 CAPSULE | Refills: 0 | Status: SHIPPED | OUTPATIENT
Start: 2021-11-29 | End: 2022-03-03

## 2021-11-29 RX ORDER — LEVOTHYROXINE SODIUM 0.12 MG/1
125 TABLET ORAL
Qty: 90 TABLET | Refills: 0 | Status: SHIPPED | OUTPATIENT
Start: 2021-11-29 | End: 2022-02-21

## 2021-12-02 ENCOUNTER — PATIENT MESSAGE (OUTPATIENT)
Dept: FAMILY MEDICINE CLINIC | Facility: CLINIC | Age: 71
End: 2021-12-02

## 2021-12-02 DIAGNOSIS — J30.9 ALLERGIC RHINITIS, UNSPECIFIED SEASONALITY, UNSPECIFIED TRIGGER: Primary | ICD-10-CM

## 2021-12-03 RX ORDER — FLUTICASONE PROPIONATE 50 MCG
2 SPRAY, SUSPENSION (ML) NASAL DAILY
Qty: 16 G | Refills: 3 | Status: SHIPPED | OUTPATIENT
Start: 2021-12-03 | End: 2022-02-01

## 2022-02-01 DIAGNOSIS — J30.9 ALLERGIC RHINITIS, UNSPECIFIED SEASONALITY, UNSPECIFIED TRIGGER: ICD-10-CM

## 2022-02-01 RX ORDER — FLUTICASONE PROPIONATE 50 MCG
SPRAY, SUSPENSION (ML) NASAL
Qty: 16 ML | Refills: 3 | Status: SHIPPED | OUTPATIENT
Start: 2022-02-01 | End: 2022-05-03

## 2022-02-21 DIAGNOSIS — F41.9 ANXIETY: ICD-10-CM

## 2022-02-21 DIAGNOSIS — R73.03 PREDIABETES: ICD-10-CM

## 2022-02-21 DIAGNOSIS — E03.9 ACQUIRED HYPOTHYROIDISM: ICD-10-CM

## 2022-02-21 RX ORDER — CITALOPRAM 40 MG/1
TABLET ORAL
Qty: 90 TABLET | Refills: 0 | Status: SHIPPED | OUTPATIENT
Start: 2022-02-21 | End: 2022-05-23

## 2022-02-21 RX ORDER — LEVOTHYROXINE SODIUM 0.12 MG/1
125 TABLET ORAL
Qty: 90 TABLET | Refills: 0 | Status: SHIPPED | OUTPATIENT
Start: 2022-02-21 | End: 2022-05-23

## 2022-02-21 NOTE — TELEPHONE ENCOUNTER
Requested Prescriptions     Pending Prescriptions Disp Refills    levothyroxine 125 mcg tablet [Pharmacy Med Name: LEVOTHYROXINE 125 MCG TABLET] 90 tablet 0     Sig: TAKE 1 TABLET (125 MCG TOTAL) BY MOUTH DAILY IN THE EARLY MORNING    citalopram (CeleXA) 40 mg tablet [Pharmacy Med Name: CITALOPRAM HBR 40 MG TABLET] 90 tablet 0     Sig: TAKE 1 TABLET BY MOUTH EVERY DAY    metFORMIN (GLUCOPHAGE) 500 mg tablet [Pharmacy Med Name: METFORMIN  MG TABLET] 90 tablet 0     Sig: TAKE 1 TABLET BY MOUTH EVERY DAY     LOV 11/29/21, F/U 5/31/22, Labs pending

## 2022-02-22 DIAGNOSIS — I26.99 BILATERAL PULMONARY EMBOLISM (HCC): ICD-10-CM

## 2022-02-22 RX ORDER — APIXABAN 5 MG/1
TABLET, FILM COATED ORAL
Qty: 180 TABLET | Refills: 0 | Status: SHIPPED | OUTPATIENT
Start: 2022-02-22 | End: 2022-05-23

## 2022-03-03 DIAGNOSIS — M54.32 SCIATICA OF LEFT SIDE: ICD-10-CM

## 2022-03-03 RX ORDER — GABAPENTIN 300 MG/1
CAPSULE ORAL
Qty: 60 CAPSULE | Refills: 2 | Status: SHIPPED | OUTPATIENT
Start: 2022-03-03 | End: 2022-06-06

## 2022-03-03 NOTE — TELEPHONE ENCOUNTER
Requested Prescriptions     Pending Prescriptions Disp Refills    gabapentin (NEURONTIN) 300 mg capsule [Pharmacy Med Name: GABAPENTIN 300 MG CAPSULE] 60 capsule 2     Sig: TAKE 1 CAPSULE BY MOUTH TWICE A DAY     LOV 11/29/21, F/U 5/31/22, labs pending

## 2022-04-27 ENCOUNTER — TELEPHONE (OUTPATIENT)
Dept: FAMILY MEDICINE CLINIC | Facility: CLINIC | Age: 72
End: 2022-04-27

## 2022-04-27 NOTE — TELEPHONE ENCOUNTER
Patient is prediabetic not diabetic   No indication for statin in pre diabetes but I can review test results of lipid panel and assess risk at his next OV

## 2022-04-27 NOTE — TELEPHONE ENCOUNTER
HN, Pharmacy called stating that they feel because of pts Dx of Diabetes pt should be on a Statin, Let me know what to tell the pharmacy

## 2022-05-02 DIAGNOSIS — J30.9 ALLERGIC RHINITIS, UNSPECIFIED SEASONALITY, UNSPECIFIED TRIGGER: ICD-10-CM

## 2022-05-03 RX ORDER — FLUTICASONE PROPIONATE 50 MCG
SPRAY, SUSPENSION (ML) NASAL
Qty: 48 ML | Refills: 1 | Status: SHIPPED | OUTPATIENT
Start: 2022-05-03

## 2022-05-09 ENCOUNTER — RA CDI HCC (OUTPATIENT)
Dept: OTHER | Facility: HOSPITAL | Age: 72
End: 2022-05-09

## 2022-05-09 NOTE — PROGRESS NOTES
[facility]  coding opportunities       Chart reviewed, no opportunity found:   [address]        Patients Insurance     Medicare Insurance: [employer]

## 2022-05-12 LAB
BASOPHILS # BLD AUTO: 42 CELLS/UL (ref 0–200)
BASOPHILS NFR BLD AUTO: 0.9 %
CHOLEST SERPL-MCNC: 164 MG/DL
CHOLEST/HDLC SERPL: 4.3 (CALC)
EOSINOPHIL # BLD AUTO: 150 CELLS/UL (ref 15–500)
EOSINOPHIL NFR BLD AUTO: 3.2 %
ERYTHROCYTE [DISTWIDTH] IN BLOOD BY AUTOMATED COUNT: 12.4 % (ref 11–15)
HCT VFR BLD AUTO: 42.6 % (ref 38.5–50)
HDLC SERPL-MCNC: 38 MG/DL
HGB BLD-MCNC: 14.5 G/DL (ref 13.2–17.1)
LDLC SERPL CALC-MCNC: 109 MG/DL (CALC)
LYMPHOCYTES # BLD AUTO: 2519 CELLS/UL (ref 850–3900)
LYMPHOCYTES NFR BLD AUTO: 53.6 %
MCH RBC QN AUTO: 31.3 PG (ref 27–33)
MCHC RBC AUTO-ENTMCNC: 34 G/DL (ref 32–36)
MCV RBC AUTO: 91.8 FL (ref 80–100)
MONOCYTES # BLD AUTO: 423 CELLS/UL (ref 200–950)
MONOCYTES NFR BLD AUTO: 9 %
NEUTROPHILS # BLD AUTO: 1565 CELLS/UL (ref 1500–7800)
NEUTROPHILS NFR BLD AUTO: 33.3 %
NONHDLC SERPL-MCNC: 126 MG/DL (CALC)
PLATELET # BLD AUTO: 210 THOUSAND/UL (ref 140–400)
PMV BLD REES-ECKER: 9.2 FL (ref 7.5–12.5)
PSA SERPL-MCNC: 2.29 NG/ML
RBC # BLD AUTO: 4.64 MILLION/UL (ref 4.2–5.8)
TRIGL SERPL-MCNC: 81 MG/DL
TSH SERPL-ACNC: 1.98 MIU/L (ref 0.4–4.5)
WBC # BLD AUTO: 4.7 THOUSAND/UL (ref 3.8–10.8)

## 2022-05-21 DIAGNOSIS — F41.9 ANXIETY: ICD-10-CM

## 2022-05-21 DIAGNOSIS — I26.99 BILATERAL PULMONARY EMBOLISM (HCC): ICD-10-CM

## 2022-05-21 DIAGNOSIS — R73.03 PREDIABETES: ICD-10-CM

## 2022-05-21 DIAGNOSIS — E03.9 ACQUIRED HYPOTHYROIDISM: ICD-10-CM

## 2022-05-23 RX ORDER — APIXABAN 5 MG/1
TABLET, FILM COATED ORAL
Qty: 180 TABLET | Refills: 0 | Status: SHIPPED | OUTPATIENT
Start: 2022-05-23

## 2022-05-23 RX ORDER — LEVOTHYROXINE SODIUM 0.12 MG/1
125 TABLET ORAL
Qty: 90 TABLET | Refills: 0 | Status: SHIPPED | OUTPATIENT
Start: 2022-05-23

## 2022-05-23 RX ORDER — CITALOPRAM 40 MG/1
TABLET ORAL
Qty: 90 TABLET | Refills: 0 | Status: SHIPPED | OUTPATIENT
Start: 2022-05-23

## 2022-05-23 NOTE — TELEPHONE ENCOUNTER
Requested Prescriptions     Pending Prescriptions Disp Refills    Eliquis 5 MG [Pharmacy Med Name: ELIQUIS 5 MG TABLET] 180 tablet 0     Sig: TAKE 1 TABLET BY MOUTH TWICE A DAY    levothyroxine 125 mcg tablet [Pharmacy Med Name: LEVOTHYROXINE 125 MCG TABLET] 90 tablet 0     Sig: TAKE 1 TABLET (125 MCG TOTAL) BY MOUTH DAILY IN THE EARLY MORNING    citalopram (CeleXA) 40 mg tablet [Pharmacy Med Name: CITALOPRAM HBR 40 MG TABLET] 90 tablet 0     Sig: TAKE 1 TABLET BY MOUTH EVERY DAY    metFORMIN (GLUCOPHAGE) 500 mg tablet [Pharmacy Med Name: METFORMIN  MG TABLET] 90 tablet 0     Sig: TAKE 1 TABLET BY MOUTH EVERY DAY     LOV 11/29/21, F/U 5/31/22, labs completed

## 2022-06-03 DIAGNOSIS — M54.32 SCIATICA OF LEFT SIDE: ICD-10-CM

## 2022-06-06 RX ORDER — GABAPENTIN 300 MG/1
CAPSULE ORAL
Qty: 60 CAPSULE | Refills: 2 | Status: SHIPPED | OUTPATIENT
Start: 2022-06-06

## 2022-06-06 NOTE — TELEPHONE ENCOUNTER
Requested Prescriptions     Pending Prescriptions Disp Refills    gabapentin (NEURONTIN) 300 mg capsule [Pharmacy Med Name: GABAPENTIN 300 MG CAPSULE] 60 capsule 2     Sig: TAKE 1 CAPSULE BY MOUTH TWICE A DAY     LOV 11/29/21, F/U 6/16/22, Labs completed

## 2022-06-16 ENCOUNTER — OFFICE VISIT (OUTPATIENT)
Dept: FAMILY MEDICINE CLINIC | Facility: CLINIC | Age: 72
End: 2022-06-16
Payer: COMMERCIAL

## 2022-06-16 VITALS
OXYGEN SATURATION: 92 % | DIASTOLIC BLOOD PRESSURE: 76 MMHG | HEART RATE: 85 BPM | SYSTOLIC BLOOD PRESSURE: 112 MMHG | HEIGHT: 74 IN | BODY MASS INDEX: 37.73 KG/M2 | WEIGHT: 294 LBS

## 2022-06-16 DIAGNOSIS — R03.0 ELEVATED BLOOD PRESSURE READING: ICD-10-CM

## 2022-06-16 DIAGNOSIS — R73.03 PREDIABETES: ICD-10-CM

## 2022-06-16 DIAGNOSIS — R91.1 LUNG NODULE: ICD-10-CM

## 2022-06-16 DIAGNOSIS — F41.9 ANXIETY: ICD-10-CM

## 2022-06-16 DIAGNOSIS — F33.42 RECURRENT MAJOR DEPRESSIVE DISORDER, IN FULL REMISSION (HCC): ICD-10-CM

## 2022-06-16 DIAGNOSIS — E66.01 CLASS 2 SEVERE OBESITY DUE TO EXCESS CALORIES WITH SERIOUS COMORBIDITY AND BODY MASS INDEX (BMI) OF 37.0 TO 37.9 IN ADULT (HCC): ICD-10-CM

## 2022-06-16 DIAGNOSIS — I26.99 BILATERAL PULMONARY EMBOLISM (HCC): Primary | ICD-10-CM

## 2022-06-16 DIAGNOSIS — R06.02 SOB (SHORTNESS OF BREATH) ON EXERTION: ICD-10-CM

## 2022-06-16 DIAGNOSIS — E03.9 ACQUIRED HYPOTHYROIDISM: ICD-10-CM

## 2022-06-16 DIAGNOSIS — M54.32 SCIATICA OF LEFT SIDE: ICD-10-CM

## 2022-06-16 LAB — SL AMB POCT HEMOGLOBIN AIC: 5.7 (ref ?–6.5)

## 2022-06-16 PROCEDURE — 94618 PULMONARY STRESS TESTING: CPT | Performed by: NURSE PRACTITIONER

## 2022-06-16 PROCEDURE — 1036F TOBACCO NON-USER: CPT | Performed by: NURSE PRACTITIONER

## 2022-06-16 PROCEDURE — 3008F BODY MASS INDEX DOCD: CPT | Performed by: NURSE PRACTITIONER

## 2022-06-16 PROCEDURE — 1160F RVW MEDS BY RX/DR IN RCRD: CPT | Performed by: NURSE PRACTITIONER

## 2022-06-16 PROCEDURE — 99214 OFFICE O/P EST MOD 30 MIN: CPT | Performed by: NURSE PRACTITIONER

## 2022-06-16 PROCEDURE — 83036 HEMOGLOBIN GLYCOSYLATED A1C: CPT | Performed by: NURSE PRACTITIONER

## 2022-06-16 RX ORDER — ALPRAZOLAM 2 MG/1
2 TABLET ORAL DAILY
Qty: 90 TABLET | Refills: 0 | Status: SHIPPED | OUTPATIENT
Start: 2022-06-16

## 2022-06-16 NOTE — PROGRESS NOTES
poct Assessment and Plan:    Problem List Items Addressed This Visit        Endocrine    Acquired hypothyroidism     tsh normal  Continue levothyroxine 125mcg  Cardiovascular and Mediastinum    Bilateral pulmonary embolism ([address] ) - Primary     Lifelong eliquis  Relevant Orders    POCT 6 minute walk (Completed)       Nervous and Auditory    Sciatica of left side     Gabapentin is controlling symptoms  Other    Anxiety     New contract signed today  He is on celexa 40mg and xanax 2mg daily  Relevant Medications    ALPRAZolam (XANAX) 2 MG tablet    Depression     Patient is doing much better now  He is on celexa  Relevant Medications    ALPRAZolam (XANAX) 2 MG tablet    Class 2 severe obesity due to excess calories with serious comorbidity and body mass index (BMI) of 37 0 to 37 9 in [facility])    Lung nodule     Annual ct recommended  Currently up to date due in fall  Sees pulmonary            Prediabetes     Patient is on metformin  Recheck A1c is 5 7%           Relevant Orders    POCT hemoglobin A1c (Completed)    Hemoglobin A1C    Comprehensive metabolic panel    Elevated blood pressure reading     Blood pressure continues to be normal            SOB (shortness of breath) on exertion     6 min walk test completed and was normal  Patient resting pulse ox was lower at 92% and increased with activity so we discussed intentional deep breathing exercises                         Diagnoses and all orders for this visit:    Bilateral pulmonary embolism (HCC)  -     POCT 6 minute walk    Anxiety  -     ALPRAZolam (XANAX) 2 MG tablet; Take 1 tablet (2 mg total) by mouth in the morning    Acquired hypothyroidism    Prediabetes  -     POCT hemoglobin A1c  -     Hemoglobin A1C; Future  -     Comprehensive metabolic panel;  Future    Lung nodule    Sciatica of left side    Class 2 severe obesity due to excess calories with serious comorbidity and body mass index (BMI) of 37 0 to 37 9 in adult [facility])    Recurrent major depressive disorder, in full remission ([facility] [address] )    Elevated blood pressure reading    SOB (shortness of breath) on exertion            Subjective:      Patient ID: Kashif Ornelas is a 81 y o  male  CC:    Chief Complaint   Patient presents with    Follow-up     Patient present today for his 6 month follow up  HPI:    HPI    Patient presents for routine follow up  He states he will been getting his teeth taken care of  He states the gabapentin is doing well  He is concerned that his oxygen is low  Patient is taking xanax 1/3 tablet up to 3 times per day  His depression much better now that he has permament position  Patient sob still persist but is stable  He is on eliquis for life long due to bilateral PE  Doing well  The following portions of the patient's history were reviewed and updated as appropriate: allergies, current medications, past family history, past medical history, past social history, past surgical history and problem list       Review of Systems   Constitutional: Negative for diaphoresis, fatigue and unexpected weight change  HENT: Negative for trouble swallowing  Eyes: Negative for visual disturbance  Respiratory: Positive for shortness of breath  Negative for cough and chest tightness  Cardiovascular: Negative for chest pain, palpitations and leg swelling  Gastrointestinal: Negative for constipation  Endocrine: Negative for polydipsia, polyphagia and polyuria  Genitourinary: Negative for difficulty urinating  Musculoskeletal: Negative for arthralgias and myalgias  Neurological: Negative for dizziness, light-headedness and headaches  Psychiatric/Behavioral: Negative for dysphoric mood and sleep disturbance  The patient is nervous/anxious            Data to review:   Free T4   Date Value Ref Range Status   01/28/2020 1 22 0 76 - 1 46 ng/dL Final     Comment:       Specimen collection should occur prior to Sulfasalazine administration due to the potential for falsely elevated results  Free t4   Date Value Ref Range Status   08/11/2020 1 5 0 8 - 1 8 ng/dL Final     Total Cholesterol   Date Value Ref Range Status   05/12/2022 164 <200 mg/dL Final     HDL   Date Value Ref Range Status   05/12/2022 38 (L) > OR = 40 mg/dL Final     Triglycerides   Date Value Ref Range Status   05/12/2022 81 <150 mg/dL Final     Glucose, Random   Date Value Ref Range Status   04/21/2021 91 65 - 99 mg/dL Final     Comment:                   Fasting reference interval          Sodium   Date Value Ref Range Status   04/21/2021 138 135 - 146 mmol/L Final     Potassium   Date Value Ref Range Status   04/21/2021 4 7 3 5 - 5 3 mmol/L Final     Creatinine   Date Value Ref Range Status   04/21/2021 1 19 0 70 - 1 25 mg/dL Final     Comment:     For patients >52years of age, the reference limit  for Creatinine is approximately 13% higher for people  identified as -American         12/28/2019 1 28 0 60 - 1 30 mg/dL Final     Comment:     Standardized to IDMS reference method     AST   Date Value Ref Range Status   04/21/2021 19 10 - 35 U/L Final     ALT   Date Value Ref Range Status   04/21/2021 15 9 - 46 U/L Final     Hemoglobin A1C   Date Value Ref Range Status   06/16/2022 5 7 6 5 Final   08/18/2021 5 7  Final   04/21/2021 6 1 (H) <5 7 % of total Hgb Final     Comment:     For someone without known diabetes, a hemoglobin   A1c value between 5 7% and 6 4% is consistent with  prediabetes and should be confirmed with a   follow-up test      For someone with known diabetes, a value <7%  indicates that their diabetes is well controlled  A1c  targets should be individualized based on duration of  diabetes, age, comorbid conditions, and other  considerations  This assay result is consistent with an increased risk  of diabetes       Currently, no consensus exists regarding use of  hemoglobin A1c for diagnosis of diabetes for children  Calcium   Date Value Ref Range Status   04/21/2021 9 7 8 6 - 10 3 mg/dL Final       TSH 3RD GENERATON   Date Value Ref Range Status   01/28/2020 0 346 (L) 0 465 - 4 680 uIU/mL Final     Comment:       Using supplements with high doses of biotin 20 to more than 300 times greater than the adequate daily intake for adults of 30 mcg/day as established by the [place] of Medicine, can cause falsely depress results  10/10/2016 3 87 0 40 - 4 50 mIU/L Final     Comment:       Performed at: [id number] [facility], [place], [facility]  [address] [place], [place] [address]            Objective:    Vitals:    06/16/22 1031   BP: 112/76   BP Location: Left arm   Patient Position: Sitting   Cuff Size: Large   Pulse: 85   SpO2: 92%   Weight: 133 kg (294 lb)   Height: 6' 2" (1 88 m)        Physical Exam  Vitals and nursing note reviewed  Constitutional:       General: He is not in acute distress  Appearance: Normal appearance  He is not ill-appearing, toxic-appearing or diaphoretic  HENT:      Head: Normocephalic and atraumatic  Right Ear: External ear normal       Left Ear: External ear normal    Eyes:      Extraocular Movements: Extraocular movements intact  Conjunctiva/sclera: Conjunctivae normal    Cardiovascular:      Rate and Rhythm: Normal rate and regular rhythm  Heart sounds: Normal heart sounds, S1 normal and S2 normal  No murmur heard  Pulmonary:      Effort: Pulmonary effort is normal  No respiratory distress  Breath sounds: Normal breath sounds  No wheezing  Neurological:      Mental Status: He is alert and oriented to person, place, and time  Psychiatric:         Mood and Affect: Mood normal          Behavior: Behavior normal          Thought Content: Thought content normal          Judgment: Judgment normal                BMI Counseling: Body mass index is 37 75 kg/m²   The BMI is above normal  Nutrition recommendations include decreasing portion sizes, moderation in carbohydrate intake, reducing intake of saturated and trans fat and reducing intake of cholesterol  Exercise recommendations include moderate physical activity 150 minutes/week and strength training exercises  Rationale for BMI follow-up plan is due to patient being overweight or obese

## 2022-06-22 NOTE — ASSESSMENT & PLAN NOTE
6 min walk test completed and was normal  Patient resting pulse ox was lower at 92% and increased with activity so we discussed intentional deep breathing exercises

## 2022-07-20 ENCOUNTER — HOSPITAL ENCOUNTER (EMERGENCY)
Facility: HOSPITAL | Age: 72
Discharge: HOME | End: 2022-07-20
Attending: EMERGENCY MEDICINE
Payer: COMMERCIAL

## 2022-07-20 ENCOUNTER — APPOINTMENT (EMERGENCY)
Dept: RADIOLOGY | Facility: HOSPITAL | Age: 72
End: 2022-07-20
Payer: COMMERCIAL

## 2022-07-20 VITALS
OXYGEN SATURATION: 96 % | RESPIRATION RATE: 18 BRPM | DIASTOLIC BLOOD PRESSURE: 78 MMHG | SYSTOLIC BLOOD PRESSURE: 131 MMHG | TEMPERATURE: 98 F | HEART RATE: 102 BPM

## 2022-07-20 DIAGNOSIS — S81.012A KNEE LACERATION, LEFT, INITIAL ENCOUNTER: Primary | ICD-10-CM

## 2022-07-20 PROCEDURE — 99283 EMERGENCY DEPT VISIT LOW MDM: CPT

## 2022-07-20 PROCEDURE — 99284 EMERGENCY DEPT VISIT MOD MDM: CPT | Performed by: PHYSICIAN ASSISTANT

## 2022-07-20 PROCEDURE — 90471 IMMUNIZATION ADMIN: CPT

## 2022-07-20 PROCEDURE — 90715 TDAP VACCINE 7 YRS/> IM: CPT | Performed by: PHYSICIAN ASSISTANT

## 2022-07-20 PROCEDURE — 12032 INTMD RPR S/A/T/EXT 2.6-7.5: CPT | Performed by: PHYSICIAN ASSISTANT

## 2022-07-20 PROCEDURE — 73564 X-RAY EXAM KNEE 4 OR MORE: CPT

## 2022-07-20 RX ORDER — LIDOCAINE HYDROCHLORIDE AND EPINEPHRINE 10; 10 MG/ML; UG/ML
20 INJECTION, SOLUTION INFILTRATION; PERINEURAL ONCE
Status: COMPLETED | OUTPATIENT
Start: 2022-07-20 | End: 2022-07-20

## 2022-07-20 RX ORDER — CEPHALEXIN 500 MG/1
500 CAPSULE ORAL 3 TIMES DAILY
Qty: 30 CAPSULE | Refills: 0 | Status: SHIPPED | OUTPATIENT
Start: 2022-07-20 | End: 2022-07-30

## 2022-07-20 RX ORDER — CEPHALEXIN 250 MG/1
500 CAPSULE ORAL ONCE
Status: COMPLETED | OUTPATIENT
Start: 2022-07-20 | End: 2022-07-20

## 2022-07-20 RX ADMIN — TETANUS TOXOID, REDUCED DIPHTHERIA TOXOID AND ACELLULAR PERTUSSIS VACCINE, ADSORBED 0.5 ML: 5; 2.5; 8; 8; 2.5 SUSPENSION INTRAMUSCULAR at 11:39

## 2022-07-20 RX ADMIN — LIDOCAINE HYDROCHLORIDE,EPINEPHRINE BITARTRATE 20 ML: 10; .01 INJECTION, SOLUTION INFILTRATION; PERINEURAL at 11:40

## 2022-07-20 RX ADMIN — CEPHALEXIN 500 MG: 250 CAPSULE ORAL at 12:32

## 2022-07-20 NOTE — ED PROVIDER NOTES
History  Chief Complaint   Patient presents with    Knee Injury     Laceration to left knee with chain saw, currently taking eloquis, however, bleeding is currently controlled  81 y o  male with past medical history significant for chronic back pain, diabetes, thyroid disease and PE on Eliquis presents to ED with chief complaint of chainsaw laceration to left knee  Onset of symptoms reported as sudden, just prior to arrival    Location of symptoms reported as left knee  Quality is reported as laceration  Severity is reported as moderate  Associated symptoms: denies left hip, ankle or foot pain, denies paralysis, paraesthesia or weakness to LLE    Modifying factors: local pressure applied to area stopped bleeding  Context:  Patient states he was attempting to flush cut a piece of wood with a chainsaw outside at home when it accidentally kicked back and struck him in the left knee resulting in laceration this morning just prior to arrival   Denies other injuries  Takes eliquis  Last tetanus unknown  Patient works as a   States applied local pressure to area to help stop bleeding  Reviewed past medical history and visits via EPIC:  No prior visits to this ed  History provided by:  Patient and spouse   used: No        Prior to Admission Medications   Prescriptions Last Dose Informant Patient Reported? Taking?    ALPRAZolam (XANAX) 2 MG tablet   No No   Sig: Take 1 tablet (2 mg total) by mouth in the morning   Eliquis 5 MG   No No   Sig: TAKE 1 TABLET BY MOUTH TWICE A DAY   Influenza Virus Vacc Split PF 0 5 ML SUSI  Self Yes No   Sig: Afluria 4806-6330(PF) 45 mcg (15 mcg x 3)/0 5 mL intramuscular syringe   inject 0 5 milliliter intramuscularly   Patient not taking: No sig reported   citalopram (CeleXA) 40 mg tablet   No No   Sig: TAKE 1 TABLET BY MOUTH EVERY DAY   fluticasone (FLONASE) 50 mcg/act nasal spray   No No   Sig: SPRAY 2 SPRAYS INTO EACH NOSTRIL EVERY DAY gabapentin (NEURONTIN) 300 mg capsule   No No   Sig: TAKE 1 CAPSULE BY MOUTH TWICE A DAY   levothyroxine 125 mcg tablet   No No   Sig: TAKE 1 TABLET (125 MCG TOTAL) BY MOUTH DAILY IN THE EARLY MORNING   metFORMIN (GLUCOPHAGE) 500 mg tablet   No No   Sig: TAKE 1 TABLET BY MOUTH EVERY DAY      Facility-Administered Medications: None       Past Medical History:   Diagnosis Date    Back injury     Chronic back pain     Depression 2016    Diabetes mellitus ([address] )     Pulmonary embolism (HCC)     SOB (shortness of breath)     Thyroid disease        Past Surgical History:   Procedure Laterality Date    KNEE SURGERY      TONSILLECTOMY         Family History   Problem Relation Age of Onset    Hypothyroidism Mother     Mental illness Mother         disorder    Stroke Mother     Hypothyroidism Sister     Cancer Sister      I have reviewed and agree with the history as documented  E-Cigarette/Vaping    E-Cigarette Use Never User      E-Cigarette/Vaping Substances    Nicotine No     THC No     CBD No     Flavoring No     Other No     Unknown No      Social History     Tobacco Use    Smoking status: Former Smoker     Packs/day: 0 50     Years: 35 00     Pack years: 17 50     Types: Cigarettes     Start date:      Quit date:      Years since quittin 5    Smokeless tobacco: Never Used   Vaping Use    Vaping Use: Never used   Substance Use Topics    Alcohol use: Never     Comment: occasional  1 drink/wk     Drug use: Not Currently     Frequency: 14 0 times per week     Types: Marijuana     Comment: medical marijuana since november       Review of Systems   Musculoskeletal: Negative for arthralgias, gait problem and joint swelling  Skin: Positive for wound  Neurological: Negative for weakness and numbness  Hematological: Bruises/bleeds easily  All other systems reviewed and are negative  Physical Exam  Physical Exam  Vitals and nursing note reviewed     Constitutional: General: He is not in acute distress  Appearance: Normal appearance  Comments: /78 (BP Location: Left arm)   Pulse 102   Temp 98 °F (36 7 °C)   Resp 18   SpO2 96%      HENT:      Head: Normocephalic and atraumatic  Right Ear: External ear normal       Left Ear: External ear normal       Nose: Nose normal    Eyes:      General: No scleral icterus  Right eye: No discharge  Left eye: No discharge  Extraocular Movements: Extraocular movements intact  Conjunctiva/sclera: Conjunctivae normal    Cardiovascular:      Rate and Rhythm: Normal rate and regular rhythm  Pulses: Normal pulses  Pulmonary:      Effort: Pulmonary effort is normal  No respiratory distress  Musculoskeletal:         General: Signs of injury present  No swelling, tenderness or deformity  Normal range of motion  Cervical back: Normal range of motion and neck supple  No rigidity or tenderness  Comments: There is 5 5 cm linear laceration present to left anterior leg, just superior to knee  Left hip is normal to inspection, nontender to palpation with FROM  Left foot and ankle are normal to inspection, nontender to palpation with FROM  Left knee - no gross deformity, patella midline without deviation  Medial and lateral jointlines nontender  FROM left knee  Wound explored to depth, no FB present  Laceration through skin to adipose tissue  No muscle or bony involvement  Distal neurovascular tendon intact to Left lower extremity  Skin:     Capillary Refill: Capillary refill takes less than 2 seconds  Coloration: Skin is not jaundiced or pale  Findings: No erythema or rash  Neurological:      General: No focal deficit present  Mental Status: He is alert and oriented to person, place, and time  Mental status is at baseline  Cranial Nerves: No cranial nerve deficit  Sensory: No sensory deficit  Motor: No weakness        Gait: Gait normal    Psychiatric: Mood and Affect: Mood normal          Behavior: Behavior normal          Thought Content: Thought content normal          Judgment: Judgment normal          Vital Signs  ED Triage Vitals [07/20/22 1121]   Temperature Pulse Respirations Blood Pressure SpO2   98 °F (36 7 °C) 102 18 131/78 96 %      Temp src Heart Rate Source Patient Position - Orthostatic VS BP Location FiO2 (%)   -- -- Sitting Left arm --      Pain Score       --           Vitals:    07/20/22 1121   BP: 131/78   Pulse: 102   Patient Position - Orthostatic VS: Sitting         Visual Acuity      ED Medications  Medications   lidocaine-epinephrine (XYLOCAINE/EPINEPHRINE) 1 %-1:100,000 injection 20 mL (20 mL Infiltration Given 7/20/22 1140)   tetanus-diphtheria-acellular pertussis (BOOSTRIX) IM injection 0 5 mL (0 5 mL Intramuscular Given 7/20/22 1139)   cephalexin (KEFLEX) capsule 500 mg (500 mg Oral Given 7/20/22 1232)       Diagnostic Studies  Results Reviewed     None                 XR knee 4+ views left injury   Final Result by Isabel Pretty MD (07/20 1220)   Mild tricompartmental degenerative arthritis      Suspected suprapatellar laceration       No acute osseous abnormality  Workstation performed: [id number]                    Procedures  Laceration repair    Date/Time: 7/20/2022 12:24 PM  Performed by: Priscilla Barajas PA-C  Authorized by: Priscilla Barajas PA-C   Consent: Verbal consent obtained    Risks and benefits: risks, benefits and alternatives were discussed  Consent given by: patient  Patient identity confirmed: verbally with patient  Body area: lower extremity  Location details: left knee  Laceration length: 5 5 cm  Foreign bodies: no foreign bodies  Tendon involvement: none  Nerve involvement: none  Vascular damage: no  Anesthesia: local infiltration    Anesthesia:  Local Anesthetic: lidocaine 1% with epinephrine    Sedation:  Patient sedated: no      Wound Dehiscence:    Secondary closure or dehiscence: complex    Procedure Details:  Preparation: Patient was prepped and draped in the usual sterile fashion  Irrigation solution: saline  Irrigation method: jet lavage  Amount of cleaning: extensive  Debridement: minimal  Degree of undermining: minimal  Skin closure: 4-0 nylon  Number of sutures: 10  Technique: continuous  Approximation: close  Approximation difficulty: complex  Dressing: antibiotic ointment and 4x4 sterile gauze  Patient tolerance: patient tolerated the procedure well with no immediate complications  Comments: 4-0 nylon continuous 9 throws and 4-0 nylon 1 throw interrupted suture at lateral edge of wound  ED Course                               SBIRT 22yo+    Flowsheet Row Most Recent Value   SBIRT (23 yo +)    In order to provide better care to our patients, we are screening all of our patients for alcohol and drug use  Would it be okay to ask you these screening questions? Yes Filed at: 07/20/2022 1143   Initial Alcohol Screen: US AUDIT-C     1  How often do you have a drink containing alcohol? 0 Filed at: 07/20/2022 1143   2  How many drinks containing alcohol do you have on a typical day you are drinking? 0 Filed at: 07/20/2022 1143   3a  Male UNDER 65: How often do you have five or more drinks on one occasion? 0 Filed at: 07/20/2022 1143   3b  FEMALE Any Age, or MALE 65+: How often do you have 4 or more drinks on one occassion? 0 Filed at: 07/20/2022 1143   Audit-C Score 0 Filed at: 07/20/2022 1143   PIA: How many times in the past year have you    Used an illegal drug or used a prescription medication for non-medical reasons? Never Filed at: 07/20/2022 1143                    MDM  Number of Diagnoses or Management Options  Knee laceration, left, initial encounter: new and requires workup  Diagnosis management comments: ddx includes but is not limited to FB, joint infection, fracture, open fracture, laceration, injury to underlying structures    Plan check x-ray to rule out fracture or foreign body  Explore wound to depth, to identify underlying structural injury or joint involvement  Anesthetized surgical closure  Update tetanus  Xray images left knee independently visualized and interpreted by me - no acute fracture or dislocation  No radiopaque foreign body     ED course:  79-year-old male says and the chainsaw laceration to the left knee or cutting related outside at home earlier today  Clinically he is distal neurovascular intact to the left lower extremity on exam   He does take Eliquis  There is no excessive bleeding  Bleeding is controlled  Wound was explored to depth  No foreign body or injury leading underlying structure  No joint involvement  Wound was extensively irrigated with over 1 liter saline and closed with 4-0 nylon 9 throw running suture and 4-0 nylon 1 interrupted suture  Boostrix updated in the emergency department  Started on Keflex in emergency department instructed to continue for 10 days  Patient tolerated surgical closure well  Instructed regarding care sutured wound  Instructed re: suture removal 14 days, avoid bending or use of knee during healing  Instructed closely monitor wound for any signs of developing infection and instructed return to ED for evaluation if this should occur  Care of sutured wound given  I discussed diagnosis and treatment plan with patient at bedside  Extended discussion with patient regarding the diagnosis, pathophysiology, expectant coarse and treatment plan  Instructed to follow up with pcp and recommended specialist (orthopedics) in 3-5 days  Reviewed reasons to return to ed  Patient verbalized understanding of diagnosis and agreement with discharge plan of care as well as understanding of reasons to return to ed           Amount and/or Complexity of Data Reviewed  Tests in the radiology section of CPT®: ordered and reviewed  Discussion of test results with the performing providers: yes  Obtain history from someone other than the patient: yes (Spouse)  Review and summarize past medical records: yes  Independent visualization of images, tracings, or specimens: yes    Risk of Complications, Morbidity, and/or Mortality  General comments: Disclaimers:    I have reasonably determine that electronically prescribing a controlled substance would be impractical for the patient to obtain the controlled substance prescribed by electronic prescription or would cause an untimely delay resulting in an adverse impact on the patient's medical condition        Patient was seen during the outbreak of the corona virus epidemic   Resources are limited due to the severity of patient illnesses associated with virus   Testing is also limited at this time   Discussed with patient at the time of this evaluation   Due to the fact that limited resources are available -treatment options are limited  Patient Progress  Patient progress: stable      Disposition  Final diagnoses:   Knee laceration, left, initial encounter     Time reflects when diagnosis was documented in both MDM as applicable and the Disposition within this note     Time User Action Codes Description Comment    7/20/2022 12:21 PM Libia Franco Add [[id number]] Knee laceration, left, initial encounter       ED Disposition     ED Disposition   Discharge    Condition   Stable    Date/Time   Wed Jul 20, 2022 12:21 PM    1 Cindi Ramirez discharge to home/self care                 Follow-up Information     Follow up With Specialties Details Why Contact Info Additional [address] [facility], [address] [place] [address], Nurse Practitioner Call in 3 days For wound re-check 53 [facility] [address]  2 [facility], [place] Emergency Department Emergency Medicine Go in 14 days For suture removal, return sooner for wound check or for any signs of infection 34 [facility] 109 [facility] Emergency Department, [address] [place], [place], [address] [facility], DO Orthopedic Surgery Call  follow up as needed for any complications or signs of joint infection [address]  Con   [phone]             Discharge Medication List as of 7/20/2022 12:23 PM      START taking these medications    Details   cephalexin (KEFLEX) 500 mg capsule Take 1 capsule (500 mg total) by mouth 3 (three) times a day for 10 days, Starting Wed 7/20/2022, Until Sat 7/30/2022, Normal         CONTINUE these medications which have NOT CHANGED    Details   ALPRAZolam (XANAX) 2 MG tablet Take 1 tablet (2 mg total) by mouth in the morning, Starting Thu 6/16/2022, Normal      citalopram (CeleXA) 40 mg tablet TAKE 1 TABLET BY MOUTH EVERY DAY, Normal      Eliquis 5 MG TAKE 1 TABLET BY MOUTH TWICE A DAY, Normal      fluticasone (FLONASE) 50 mcg/act nasal spray SPRAY 2 SPRAYS INTO EACH NOSTRIL EVERY DAY, Normal      gabapentin (NEURONTIN) 300 mg capsule TAKE 1 CAPSULE BY MOUTH TWICE A DAY, Normal      Influenza Virus Vacc Split PF 0 5 ML SUSI Afluria 1643-8069(PF) 45 mcg (15 mcg x 3)/0 5 mL intramuscular syringe   inject 0 5 milliliter intramuscularly, Historical Med      levothyroxine 125 mcg tablet TAKE 1 TABLET (125 MCG TOTAL) BY MOUTH DAILY IN THE EARLY MORNING, Starting Mon 5/23/2022, Normal      metFORMIN (GLUCOPHAGE) 500 mg tablet TAKE 1 TABLET BY MOUTH EVERY DAY, Normal             No discharge procedures on file      PDMP Review       Value Time User    PDMP Reviewed  Yes 6/16/2022 10:54 AM REMY Romero          ED Provider  Electronically Signed by           Janay Catalan PA-C  07/20/22 3163

## 2022-07-20 NOTE — DISCHARGE INSTRUCTIONS
Suture instructions: suture removal in 14 days - return to ED, any urgent care or primary care physician for suture removal   apply bacitracin, neosporin or any other triple antibiotic ointment to area 3 times a day until suture removal   return to ED for any redness, purulent drainage, increasing pain or any other worrisome or worsening symptoms  do not submerge laceration in water - if it gets wet, pat it dry immediately  use tylenol or motrin over the counter as needed for pain/swelling

## 2022-07-22 ENCOUNTER — VBI (OUTPATIENT)
Dept: FAMILY MEDICINE CLINIC | Facility: CLINIC | Age: 72
End: 2022-07-22

## 2022-07-22 NOTE — TELEPHONE ENCOUNTER
Annemarie Hirsch    ED Visit Information     Ed visit date: 7/20/22  Diagnosis Description: Knee laceration, left, initial encounter  In Network? Yes [place]  Discharge status: Home  Discharged with meds ? Yes  Number of ED visits to date: 1  ED Severity:3     Outreach Information    Outreach successful: Yes 1  Date letter mailed:0  Date Finalized:7-22-22    Care Coordination    Follow up appointment with pcp: no Ed follow up for suture removal   Transportation issues ? No    [employer] type: 3 Day Outreach  Emergent necessity warranted by diagnosis: Yes  [facility] PCP: Yes  Transportation: Friend/Family Transport  Called PCP first?: No  Feels able to call PCP for urgent problems ?: Yes  Understands what emergencies can be handled by PCP ?: Yes  Ever any problems getting appointment with PCP for minor emergency/urgency problems?: No  Practice Contacted Patient ?: No  Pt had ED follow up with pcp/staff ?: No    Seen for follow-up out of network ?: No  Reason Patient went to ED instead of Urgent Care or PCP?: Proxim[facility]  Urgent care Education?: No  [address] ED visit -There was a Personal communication with patient regarding recent ED visit on 7/22/22  Patient stated that he is doing well, Patient declined a follow up with PCP  Patient is aware of PCP after hour's on-call service, Patient is aware of their nearest [place] [address] urgent care facility, education not given  Hospital is closer- Patient does not meet OPCM criteria    Patient stated the ER physician told him to return to ED for suture removal due to the type of injury and b/c his is on a blood thinner

## 2022-07-23 ENCOUNTER — APPOINTMENT (EMERGENCY)
Dept: CT IMAGING | Facility: HOSPITAL | Age: 72
End: 2022-07-23
Payer: COMMERCIAL

## 2022-07-23 ENCOUNTER — HOSPITAL ENCOUNTER (EMERGENCY)
Facility: HOSPITAL | Age: 72
Discharge: HOME | End: 2022-07-23
Attending: EMERGENCY MEDICINE
Payer: COMMERCIAL

## 2022-07-23 VITALS
WEIGHT: 293.21 LBS | BODY MASS INDEX: 37.65 KG/M2 | HEART RATE: 70 BPM | TEMPERATURE: 97.9 F | OXYGEN SATURATION: 98 % | DIASTOLIC BLOOD PRESSURE: 78 MMHG | SYSTOLIC BLOOD PRESSURE: 134 MMHG | RESPIRATION RATE: 17 BRPM

## 2022-07-23 DIAGNOSIS — L03.116 CELLULITIS OF LEFT LEG: ICD-10-CM

## 2022-07-23 DIAGNOSIS — Z51.89 VISIT FOR WOUND CHECK: Primary | ICD-10-CM

## 2022-07-23 DIAGNOSIS — S81.812A LACERATION OF LEFT LOWER EXTREMITY, INITIAL ENCOUNTER: ICD-10-CM

## 2022-07-23 LAB
ANION GAP SERPL CALCULATED.3IONS-SCNC: 8 MMOL/L (ref 4–13)
BASOPHILS # BLD AUTO: 0.02 THOUSANDS/ΜL (ref 0–0.1)
BASOPHILS NFR BLD AUTO: 0 % (ref 0–1)
BUN SERPL-MCNC: 14 MG/DL (ref 5–25)
CALCIUM SERPL-MCNC: 9.1 MG/DL (ref 8.3–10.1)
CHLORIDE SERPL-SCNC: 103 MMOL/L (ref 96–108)
CO2 SERPL-SCNC: 26 MMOL/L (ref 21–32)
CREAT SERPL-MCNC: 1.32 MG/DL (ref 0.6–1.3)
EOSINOPHIL # BLD AUTO: 0.14 THOUSAND/ΜL (ref 0–0.61)
EOSINOPHIL NFR BLD AUTO: 2 % (ref 0–6)
ERYTHROCYTE [DISTWIDTH] IN BLOOD BY AUTOMATED COUNT: 12.6 % (ref 11.6–15.1)
FLUAV RNA RESP QL NAA+PROBE: NEGATIVE
FLUBV RNA RESP QL NAA+PROBE: NEGATIVE
GFR SERPL CREATININE-BSD FRML MDRD: 54 ML/MIN/1.73SQ M
GLUCOSE SERPL-MCNC: 138 MG/DL (ref 65–140)
HCT VFR BLD AUTO: 41.6 % (ref 36.5–49.3)
HGB BLD-MCNC: 14 G/DL (ref 12–17)
IMM GRANULOCYTES # BLD AUTO: 0.02 THOUSAND/UL (ref 0–0.2)
IMM GRANULOCYTES NFR BLD AUTO: 0 % (ref 0–2)
LYMPHOCYTES # BLD AUTO: 1.89 THOUSANDS/ΜL (ref 0.6–4.47)
LYMPHOCYTES NFR BLD AUTO: 31 % (ref 14–44)
MCH RBC QN AUTO: 31.5 PG (ref 26.8–34.3)
MCHC RBC AUTO-ENTMCNC: 33.7 G/DL (ref 31.4–37.4)
MCV RBC AUTO: 94 FL (ref 82–98)
MONOCYTES # BLD AUTO: 0.64 THOUSAND/ΜL (ref 0.17–1.22)
MONOCYTES NFR BLD AUTO: 11 % (ref 4–12)
NEUTROPHILS # BLD AUTO: 3.4 THOUSANDS/ΜL (ref 1.85–7.62)
NEUTS SEG NFR BLD AUTO: 56 % (ref 43–75)
NRBC BLD AUTO-RTO: 0 /100 WBCS
PLATELET # BLD AUTO: 178 THOUSANDS/UL (ref 149–390)
PMV BLD AUTO: 8.7 FL (ref 8.9–12.7)
POTASSIUM SERPL-SCNC: 4 MMOL/L (ref 3.5–5.3)
RBC # BLD AUTO: 4.44 MILLION/UL (ref 3.88–5.62)
RSV RNA RESP QL NAA+PROBE: NEGATIVE
SARS-COV-2 RNA RESP QL NAA+PROBE: NEGATIVE
SODIUM SERPL-SCNC: 137 MMOL/L (ref 135–147)
WBC # BLD AUTO: 6.11 THOUSAND/UL (ref 4.31–10.16)

## 2022-07-23 PROCEDURE — G1004 CDSM NDSC: HCPCS

## 2022-07-23 PROCEDURE — 73701 CT LOWER EXTREMITY W/DYE: CPT

## 2022-07-23 PROCEDURE — 99284 EMERGENCY DEPT VISIT MOD MDM: CPT | Performed by: EMERGENCY MEDICINE

## 2022-07-23 PROCEDURE — 80048 BASIC METABOLIC PNL TOTAL CA: CPT | Performed by: EMERGENCY MEDICINE

## 2022-07-23 PROCEDURE — 99283 EMERGENCY DEPT VISIT LOW MDM: CPT

## 2022-07-23 PROCEDURE — 0241U HB NFCT DS VIR RESP RNA 4 TRGT: CPT | Performed by: EMERGENCY MEDICINE

## 2022-07-23 PROCEDURE — 85025 COMPLETE CBC W/AUTO DIFF WBC: CPT | Performed by: EMERGENCY MEDICINE

## 2022-07-23 PROCEDURE — 36415 COLL VENOUS BLD VENIPUNCTURE: CPT | Performed by: EMERGENCY MEDICINE

## 2022-07-23 RX ADMIN — IOHEXOL 65 ML: 350 INJECTION, SOLUTION INTRAVENOUS at 09:04

## 2022-07-23 NOTE — DISCHARGE INSTRUCTIONS
Continue to take the antibiotics as prescribed  Keep the wound open and exposed at rest at oxygen helps wounds heal   Covered with topical antibiotic ointment and bandage we were doing anything where it might get dirty  Clean it gently and pat the area dry so you do not pull out the stitches  Follow-up in 2-3 days with your PCP for re-evaluation of the wound to ensure that it is improving  Return to the ER if you develop spreading redness, fevers (temperature of 100 5° F or greater), worsening swelling, pain in the knee, difficulties moving the knee, or other concerns for infection

## 2022-07-23 NOTE — ED PROVIDER NOTES
History  Chief Complaint   Patient presents with    Wound Check     Pt reports having stitching placed in his knee Wednesday and there is now drainage  HPI    Prior to Admission Medications   Prescriptions Last Dose Informant Patient Reported? Taking? ALPRAZolam (XANAX) 2 MG tablet   No No   Sig: Take 1 tablet (2 mg total) by mouth in the morning   Eliquis 5 MG   No No   Sig: TAKE 1 TABLET BY MOUTH TWICE A DAY   Influenza Virus Vacc Split PF 0 5 ML SUSI  Self Yes No   Sig: Afluria 8364-9801(PF) 45 mcg (15 mcg x 3)/0 5 mL intramuscular syringe   inject 0 5 milliliter intramuscularly   Patient not taking: No sig reported   cephalexin (KEFLEX) 500 mg capsule   No No   Sig: Take 1 capsule (500 mg total) by mouth 3 (three) times a day for 10 days   citalopram (CeleXA) 40 mg tablet   No No   Sig: TAKE 1 TABLET BY MOUTH EVERY DAY   fluticasone (FLONASE) 50 mcg/act nasal spray   No No   Sig: SPRAY 2 SPRAYS INTO EACH NOSTRIL EVERY DAY   gabapentin (NEURONTIN) 300 mg capsule   No No   Sig: TAKE 1 CAPSULE BY MOUTH TWICE A DAY   levothyroxine 125 mcg tablet   No No   Sig: TAKE 1 TABLET (125 MCG TOTAL) BY MOUTH DAILY IN THE EARLY MORNING   metFORMIN (GLUCOPHAGE) 500 mg tablet   No No   Sig: TAKE 1 TABLET BY MOUTH EVERY DAY      Facility-Administered Medications: None       Past Medical History:   Diagnosis Date    Back injury     Chronic back pain     Depression 9/16/2016    Diabetes mellitus (HCC)     Pulmonary embolism (HCC)     SOB (shortness of breath)     Thyroid disease        Past Surgical History:   Procedure Laterality Date    KNEE SURGERY      TONSILLECTOMY         Family History   Problem Relation Age of Onset    Hypothyroidism Mother     Mental illness Mother         disorder    Stroke Mother     Hypothyroidism Sister     Cancer Sister      I have reviewed and agree with the history as documented      E-Cigarette/Vaping    E-Cigarette Use Never User      E-Cigarette/Vaping Substances    Nicotine No     THC No     CBD No     Flavoring No     Other No     Unknown No      Social History     Tobacco Use    Smoking status: Former Smoker     Packs/day: 0 50     Years: 35 00     Pack years: 17 50     Types: Cigarettes     Start date:      Quit date:      Years since quittin 5    Smokeless tobacco: Never Used   Vaping Use    Vaping Use: Never used   Substance Use Topics    Alcohol use: Never     Comment: occasional  1 drink/wk     Drug use: Not Currently     Frequency: 14 0 times per week     Types: Marijuana     Comment: medical marijuana since november       Review of Systems    Physical Exam  Physical Exam  Vitals and nursing note reviewed  Constitutional:       General: He is not in acute distress  Appearance: He is well-developed  He is obese  HENT:      Head: Normocephalic and atraumatic  Eyes:      Conjunctiva/sclera: Conjunctivae normal       Pupils: Pupils are equal, round, and reactive to light  Neck:      Trachea: No tracheal deviation  Cardiovascular:      Rate and Rhythm: Normal rate and regular rhythm  Pulses:           Dorsalis pedis pulses are 2+ on the left side  Heart sounds: Normal heart sounds  Pulmonary:      Effort: Pulmonary effort is normal  No respiratory distress  Breath sounds: Normal breath sounds  Musculoskeletal:      Cervical back: Normal range of motion  Left upper leg: Laceration present  No swelling or deformity  Left knee: Decreased range of motion (To about 90° due to pain)  No tenderness  Legs:       Comments: Sensation intact distally   Skin:     General: Skin is warm and dry  Neurological:      Mental Status: He is alert and oriented to person, place, and time  GCS: GCS eye subscore is 4  GCS verbal subscore is 5  GCS motor subscore is 6     Psychiatric:         Behavior: Behavior normal          Vital Signs  ED Triage Vitals   Temperature Pulse Respirations Blood Pressure SpO2   22 5057 07/23/22 0756 07/23/22 0756 07/23/22 0756 07/23/22 0756   97 9 °F (36 6 °C) 81 18 (!) 176/88 96 %      Temp src Heart Rate Source Patient Position - Orthostatic VS BP Location FiO2 (%)   -- 07/23/22 0756 07/23/22 1000 07/23/22 1000 --    Monitor Sitting Right arm       Pain Score       --                  Vitals:    07/23/22 0756 07/23/22 1000   BP: (!) 176/88 160/80   Pulse: 81 71   Patient Position - Orthostatic VS:  Sitting         Visual Acuity      ED Medications  Medications   iohexol (OMNIPAQUE) 350 MG/ML injection (MULTI-DOSE) 65 mL (65 mL Intravenous Given 7/23/22 0904)       Diagnostic Studies  Results Reviewed     Procedure Component Value Units Date/Time    FLU/RSV/COVID - if FLU/RSV clinically relevant [[id number]]  (Normal) Collected: 07/23/22 0833    Lab Status: Final result Specimen: Nares from Nose Updated: 07/23/22 0919     SARS-CoV-2 Negative     INFLUENZA A PCR Negative     INFLUENZA B PCR Negative     RSV PCR Negative    Narrative:      FOR PEDIATRIC PATIENTS - copy/paste COVID Guidelines URL to browser: [email or web address]  [email or web address]    SARS-CoV-2 assay is a Nucleic Acid Amplification assay intended for the  qualitative detection of nucleic acid from SARS-CoV-2 in nasopharyngeal  swabs  Results are for the presumptive identification of SARS-CoV-2 RNA  Positive results are indicative of infection with SARS-CoV-2, the virus  causing COVID-19, but do not rule out bacterial infection or co-infection  with other viruses  Laboratories within the [place] and its  territories are required to report all positive results to the appropriate  public health authorities  Negative results do not preclude SARS-CoV-2  infection and should not be used as the sole basis for treatment or other  patient management decisions  Negative results must be combined with  clinical observations, patient history, and epidemiological information    This test has not been FDA cleared or approved  This test has been authorized by FDA under an Emergency Use Authorization  (EUA)  This test is only authorized for the duration of time the  declaration that circumstances exist justifying the authorization of the  emergency use of an in vitro diagnostic tests for detection of SARS-CoV-2  virus and/or diagnosis of COVID-19 infection under section 564(b)(1) of  the Act, 21 U  S C  035QJY-1(J)(5), unless the authorization is terminated  or revoked sooner  The test has been validated but independent review by FDA  and CLIA is pending  Test performed using [employer] Gene[employer]pert: This RT-PCR assay targets N2,  a region unique to SARS-CoV-2  A conserved region in the E-gene was chosen  for pan-Sarbecovirus detection which includes SARS-CoV-2      Basic metabolic panel [[id number]]  (Abnormal) Collected: 07/23/22 0831    Lab Status: Final result Specimen: Blood from Arm, Left Updated: 07/23/22 0850     Sodium 137 mmol/L      Potassium 4 0 mmol/L      Chloride 103 mmol/L      CO2 26 mmol/L      ANION GAP 8 mmol/L      BUN 14 mg/dL      Creatinine 1 32 mg/dL      Glucose 138 mg/dL      Calcium 9 1 mg/dL      eGFR 54 ml/min/1 73sq m     Narrative:      [place] guidelines for Chronic Kidney Disease (CKD):     Stage 1 with normal or high GFR (GFR > 90 mL/min/1 73 square meters)    Stage 2 Mild CKD (GFR = 60-89 mL/min/1 73 square meters)    Stage 3A Moderate CKD (GFR = 45-59 mL/min/1 73 square meters)    Stage 3B Moderate CKD (GFR = 30-44 mL/min/1 73 square meters)    Stage 4 Severe CKD (GFR = 15-29 mL/min/1 73 square meters)    Stage 5 End Stage CKD (GFR <15 mL/min/1 73 square meters)  Note: GFR calculation is accurate only with a steady state creatinine    CBC and differential [[id number]]  (Abnormal) Collected: 07/23/22 0831    Lab Status: Final result Specimen: Blood from Arm, Left Updated: 07/23/22 0840     WBC 6 11 Thousand/uL      RBC 4 44 Million/uL      Hemoglobin 14 0 g/dL      Hematocrit 41 6 %      MCV 94 fL      MCH 31 5 pg      MCHC 33 7 g/dL      RDW 12 6 %      MPV 8 7 fL      Platelets 989 Thousands/uL      nRBC 0 /100 WBCs      Neutrophils Relative 56 %      Immat GRANS % 0 %      Lymphocytes Relative 31 %      Monocytes Relative 11 %      Eosinophils Relative 2 %      Basophils Relative 0 %      Neutrophils Absolute 3 40 Thousands/µL      Immature Grans Absolute 0 02 Thousand/uL      Lymphocytes Absolute 1 89 Thousands/µL      Monocytes Absolute 0 64 Thousand/µL      Eosinophils Absolute 0 14 Thousand/µL      Basophils Absolute 0 02 Thousands/µL                  CT lower extremity w contrast left   Final Result by Hemanth Wiley MD (07/23 1003)   Anteromedial subcutaneous edema and skin thickening consistent with nonspecific cellulitis without evidence of drainable abscess collection, soft tissue emphysema or CT findings to indicate osteomyelitis  Workstation performed: [id number]                    Procedures  Procedures         ED Course                                             MDM  Number of Diagnoses or Management Options  Cellulitis of left leg: new and requires workup  Laceration of left lower extremity, initial encounter: new and requires workup  Visit for wound check: new and requires workup  Diagnosis management comments: This is a 68-year-old male who presents here today with concern for infection around his laceration  He was seen in the emergency department on 07/20 after a laceration to his knee from a chain saw  There was no concern about deep space involvement, and the wound was repaired with stitches  The patient says the first two days he was having pain in the area and difficulties moving the knee  He change the dressing last night, and says it was covered in "dilute blood "  Noticed the area around it being red and swollen yesterday  He has been taking the Keflex as he was prescribed    He says this morning he went to change the dressing to "air it out" for the day, however had purulent drainage, out  He says the redness and swelling have improved, is not currently having any pain, and is able to move the knee better than he has been  He endorses a temperature of 99 4°  He has been having some headache which he does have at baseline anyways, but denies it being worse or different than normal   He denies cough, congestion, URI symptoms, nausea, vomiting, diarrhea, other infectious symptoms  Given purulent drainage this morning with recent redness and swelling he came today for evaluation  Review of systems:  Otherwise negative unless stated as above    He is well-appearing, in no acute distress  The wound is healing well without swelling or signs of infection externally  He has mild tenderness over the wound without induration or fluctuance  He does have some decreased range of motion of the knee to about 90° flexion  With pressure around the lateral edge of the wound does have a small amount of serosanguineous drainage  There is currently no purulent drainage  Exam is otherwise unremarkable  There are no signs of superficial infection, however with purulent drainage, there may be deeper infection not visible on exam   However, with swelling and redness last night now improved, this may be drainage of soft tissue edema that was present post irrigation in ER, as current drainage is just serosanguineous  We will check lab work and CT scan to evaluate  He is not having any other infectious symptoms, however with some headaches we will check for COVID/flu, as this may be contributing to his headaches  Lab work is unremarkable  CT scan shows changes consistent with cellulitis but no drainable abscess or signs of deep space infection  As the patient does endorse significant improvement of symptoms today compared to yesterday I do not feel that he currently requires admission for IV antibiotics    I discussed with the patient findings, continued management at home, need for close follow-up with his PCP for wound check to ensure it is improving, and indications for return, and he expresses understanding with this plan  Amount and/or Complexity of Data Reviewed  Clinical lab tests: ordered and reviewed  Tests in the radiology section of CPT®: reviewed and ordered  Independent visualization of images, tracings, or specimens: yes        Disposition  Final diagnoses:   Visit for wound check   Laceration of left lower extremity, initial encounter   Cellulitis of left leg     Time reflects when diagnosis was documented in both MDM as applicable and the Disposition within this note     Time User Action Codes Description Comment    7/23/2022 10:16 AM Silverio Rajput Messing Add [Z51 89] Visit for wound check     7/23/2022 10:16 AM Silverio Rajput Messing Add [G01 034N] Laceration of left lower extremity, initial encounter     7/23/2022 10:17 AM Silverio Rajput Messing Add [L03 119,  L02 419] Cellulitis and abscess of leg     7/23/2022 10:17 AM Debbie Rajput [K89 925,  L02 419] Cellulitis and abscess of leg     7/23/2022 10:18 AM Luigi Rajputclair Messing Add [L03 116] Cellulitis of left leg       ED Disposition     ED Disposition   Discharge    Condition   Good    Date/Time   Sat Jul 23, 2022 10:16 AM    Comment   Celeste Garcia discharge to home/self care  Follow-up Information     Follow up With Specialties Details Why [address], [address] Maulik Moses Nurse Practitioner Schedule an appointment as soon as possible for a visit in 2 days to follow up on your wound [address]  [address] [facility] [address] [phone]            Patient's Medications   Discharge Prescriptions    No medications on file       No discharge procedures on file      PDMP Review       Value Time User    PDMP Reviewed  Yes 6/16/2022 10:54 AM Rich Dudley           ED Provider  Electronically Signed by           Anup Quinn MD  07/23/22 3235

## 2022-07-25 ENCOUNTER — RA CDI HCC (OUTPATIENT)
Dept: OTHER | Facility: HOSPITAL | Age: 72
End: 2022-07-25

## 2022-07-25 NOTE — PROGRESS NOTES
[facility]  coding opportunities       Chart reviewed, no opportunity found:   [address]        Patients Insurance     Medicare Insurance: [employer]

## 2022-07-26 ENCOUNTER — OFFICE VISIT (OUTPATIENT)
Dept: FAMILY MEDICINE CLINIC | Facility: CLINIC | Age: 72
End: 2022-07-26
Payer: COMMERCIAL

## 2022-07-26 VITALS
SYSTOLIC BLOOD PRESSURE: 120 MMHG | WEIGHT: 294.8 LBS | HEIGHT: 74 IN | HEART RATE: 84 BPM | BODY MASS INDEX: 37.83 KG/M2 | DIASTOLIC BLOOD PRESSURE: 70 MMHG

## 2022-07-26 DIAGNOSIS — S81.012D KNEE LACERATION, LEFT, SUBSEQUENT ENCOUNTER: Primary | ICD-10-CM

## 2022-07-26 PROCEDURE — 1160F RVW MEDS BY RX/DR IN RCRD: CPT | Performed by: NURSE PRACTITIONER

## 2022-07-26 PROCEDURE — 99024 POSTOP FOLLOW-UP VISIT: CPT | Performed by: NURSE PRACTITIONER

## 2022-07-26 PROCEDURE — 99214 OFFICE O/P EST MOD 30 MIN: CPT | Performed by: NURSE PRACTITIONER

## 2022-07-26 NOTE — ASSESSMENT & PLAN NOTE
Wound redressed today  Although drainage is present there is no induration or worsening wound erythema  There was a loose stitch that was Popped so this was removed today     Continue with keflex until complete

## 2022-08-03 ENCOUNTER — HOSPITAL ENCOUNTER (EMERGENCY)
Facility: HOSPITAL | Age: 72
Discharge: HOME | End: 2022-08-03
Attending: EMERGENCY MEDICINE | Admitting: EMERGENCY MEDICINE
Payer: COMMERCIAL

## 2022-08-03 VITALS
DIASTOLIC BLOOD PRESSURE: 79 MMHG | BODY MASS INDEX: 37.88 KG/M2 | SYSTOLIC BLOOD PRESSURE: 128 MMHG | WEIGHT: 295 LBS | RESPIRATION RATE: 20 BRPM | TEMPERATURE: 98.6 F | HEART RATE: 78 BPM | OXYGEN SATURATION: 96 %

## 2022-08-03 DIAGNOSIS — T81.30XA WOUND DEHISCENCE: ICD-10-CM

## 2022-08-03 DIAGNOSIS — Z51.89 VISIT FOR WOUND CHECK: Primary | ICD-10-CM

## 2022-08-03 PROCEDURE — 99282 EMERGENCY DEPT VISIT SF MDM: CPT | Performed by: EMERGENCY MEDICINE

## 2022-08-03 PROCEDURE — 99282 EMERGENCY DEPT VISIT SF MDM: CPT

## 2022-08-03 NOTE — ED PROVIDER NOTES
History  Chief Complaint   Patient presents with    Wound Check     Left leg lac from chainsaw 2 wks; stictches popped and wound is no longer well approximated and there is some yellow drainage      This patient presents to the emergency department for the evaluation of a left knee wound dehiscence  He had a laceration sustained by a change so and had sutures put in about 2 weeks ago  Yesterday he pulled a stitches out himself and today when he went to see the when he was open has some yellowish drainage  No fever no chills no nausea no vomiting  The pain is minimal     Past medical history of pre diabetes he does take metformin      History provided by:  Patient   used: No        Prior to Admission Medications   Prescriptions Last Dose Informant Patient Reported? Taking?    ALPRAZolam (XANAX) 2 MG tablet  Self No No   Sig: Take 1 tablet (2 mg total) by mouth in the morning   Eliquis 5 MG  Self No No   Sig: TAKE 1 TABLET BY MOUTH TWICE A DAY   Influenza Virus Vacc Split PF 0 5 ML SUSI  Self Yes No   Sig: Afluria [id number](PF) 45 mcg (15 mcg x 3)/0 5 mL intramuscular syringe   inject 0 5 milliliter intramuscularly   Patient not taking: No sig reported   citalopram (CeleXA) 40 mg tablet  Self No No   Sig: TAKE 1 TABLET BY MOUTH EVERY DAY   fluticasone (FLONASE) 50 mcg/act nasal spray  Self No No   Sig: SPRAY 2 SPRAYS INTO EACH NOSTRIL EVERY DAY   gabapentin (NEURONTIN) 300 mg capsule  Self No No   Sig: TAKE 1 CAPSULE BY MOUTH TWICE A DAY   levothyroxine 125 mcg tablet  Self No No   Sig: TAKE 1 TABLET (125 MCG TOTAL) BY MOUTH DAILY IN THE EARLY MORNING   metFORMIN (GLUCOPHAGE) 500 mg tablet  Self No No   Sig: TAKE 1 TABLET BY MOUTH EVERY DAY      Facility-Administered Medications: None       Past Medical History:   Diagnosis Date    Back injury     Chronic back pain     Depression 9/16/2016    Diabetes mellitus (HCC)     Pulmonary embolism (HCC)     SOB (shortness of breath)     Thyroid disease        Past Surgical History:   Procedure Laterality Date    KNEE SURGERY      TONSILLECTOMY         Family History   Problem Relation Age of Onset    Hypothyroidism Mother     Mental illness Mother         disorder    Stroke Mother     Hypothyroidism Sister     Cancer Sister      I have reviewed and agree with the history as documented  E-Cigarette/Vaping    E-Cigarette Use Never User      E-Cigarette/Vaping Substances    Nicotine No     THC No     CBD No     Flavoring No     Other No     Unknown No      Social History     Tobacco Use    Smoking status: Former Smoker     Packs/day: 0 50     Years: 35 00     Pack years: 17 50     Types: Cigarettes     Start date:      Quit date:      Years since quittin 6    Smokeless tobacco: Never Used   Vaping Use    Vaping Use: Never used   Substance Use Topics    Alcohol use: Never     Comment: occasional  1 drink/wk     Drug use: Not Currently     Frequency: 14 0 times per week     Types: Marijuana     Comment: medical marijuana since november       Review of Systems   Constitutional: Negative for chills and fever  HENT: Negative for ear pain and sore throat  Eyes: Negative for pain and visual disturbance  Respiratory: Negative for cough and shortness of breath  Cardiovascular: Negative for chest pain and palpitations  Gastrointestinal: Negative for abdominal pain and vomiting  Genitourinary: Negative for dysuria and hematuria  Musculoskeletal: Negative for arthralgias and back pain  Skin: Negative for color change and rash  Neurological: Negative for seizures and syncope  All other systems reviewed and are negative  Physical Exam  Physical Exam  Vitals and nursing note reviewed  Constitutional:       Appearance: Normal appearance  He is well-developed  HENT:      Head: Normocephalic and atraumatic  Eyes:      Extraocular Movements: Extraocular movements intact        Conjunctiva/sclera: Conjunctivae normal       Pupils: Pupils are equal, round, and reactive to light  Cardiovascular:      Rate and Rhythm: Normal rate  Heart sounds: No murmur heard  Pulmonary:      Effort: Pulmonary effort is normal  No respiratory distress  Abdominal:      Palpations: Abdomen is soft  Tenderness: There is no abdominal tenderness  Musculoskeletal:      Cervical back: Neck supple  Skin:     General: Skin is warm and dry  Capillary Refill: Capillary refill takes less than 2 seconds  Comments: Left knee laceration which is about 4 in in size has some wound dehiscence  But it subcutaneous and not deep to muscle layers  There is no active bleeding  There is no signs of infection he has a slight cirrhosis deposit daughter slightly yellowish  But I believe this is inflammatory and not infectious  Spent some time with the patient doing some education about wet-to-dry dressing changes and wound debridement  Neurological:      Mental Status: He is alert  Psychiatric:         Mood and Affect: Mood normal          Behavior: Behavior normal          Vital Signs  ED Triage Vitals [08/03/22 1024]   Temperature Pulse Respirations Blood Pressure SpO2   98 6 °F (37 °C) 78 20 128/79 96 %      Temp Source Heart Rate Source Patient Position - Orthostatic VS BP Location FiO2 (%)   Oral Monitor Sitting Left arm --      Pain Score       --           Vitals:    08/03/22 1024   BP: 128/79   Pulse: 78   Patient Position - Orthostatic VS: Sitting         Visual Acuity      ED Medications  Medications - No data to display    Diagnostic Studies  Results Reviewed     None                 No orders to display              Procedures  Procedures         ED Course                               SBIRT 20yo+    Flowsheet Row Most Recent Value   SBIRT (25 yo +)    In order to provide better care to our patients, we are screening all of our patients for alcohol and drug use  Would it be okay to ask you these screening questions? Yes Filed at: 08/03/2022 1037   Initial Alcohol Screen: US AUDIT-C     1  How often do you have a drink containing alcohol? 0 Filed at: 08/03/2022 1037   2  How many drinks containing alcohol do you have on a typical day you are drinking? 0 Filed at: 08/03/2022 1037   3a  Male UNDER 65: How often do you have five or more drinks on one occasion? 0 Filed at: 08/03/2022 1037   3b  FEMALE Any Age, or MALE 65+: How often do you have 4 or more drinks on one occassion? 0 Filed at: 08/03/2022 1037   Audit-C Score 0 Filed at: 08/03/2022 1037   PIA: How many times in the past year have you    Used an illegal drug or used a prescription medication for non-medical reasons? Never Filed at: 08/03/2022 1037                    MDM  Number of Diagnoses or Management Options  Risk of Complications, Morbidity, and/or Mortality  General comments: Patient stable and well  Wound looks not infected    Patient Progress  Patient progress: stable      Disposition  Final diagnoses:   Visit for wound check   Wound dehiscence     Time reflects when diagnosis was documented in both MDM as applicable and the Disposition within this note     Time User Action Codes Description Comment    8/3/2022 10:51 AM Helena Philip Add [Z51 89] Visit for wound check     8/3/2022 10:51 AM Dale Miller Add [T81 30XA] Wound dehiscence       ED Disposition     ED Disposition   Discharge    Condition   Stable    Date/Time   Wed Aug 3, 2022 10:51 AM    Comment   Radha Marie discharge to home/self care  Follow-up Information     Follow up With Specialties Details Why [address], [address] Maul[address] Moses, Nurse Practitioner In 3 days If symptoms worsen [address] [place] [id number] [phone]            Patient's Medications   Discharge Prescriptions    No medications on file       No discharge procedures on file      PDMP Review       Value Time User    PDMP Reviewed  Yes 6/16/2022 10:54 AM Sola Linares [place], [place]          ED Provider  Electronically Signed by           Annie Hyatt MD  08/03/22 6942

## 2022-08-05 ENCOUNTER — VBI (OUTPATIENT)
Dept: FAMILY MEDICINE CLINIC | Facility: CLINIC | Age: 72
End: 2022-08-05

## 2022-08-05 NOTE — TELEPHONE ENCOUNTER
Gladis Rinne    ED Visit Information     Ed visit date: 8/3/22  Diagnosis Description: Visit for wound check; Wound dehiscence  In Network? Yes [facility]  Discharge status: Home  Discharged with meds ? No  Number of ED visits to date: 3  ED Severity:4     Outreach Information    Outreach successful: Yes 1  Date letter mailed:0  Date Finalized:8/5/22    Care Coordination    Follow up appointment with pcp: no declined  Transportation issues ? No    Value [employer] type: 3 Day Outreach  Emergent necessity warranted by diagnosis: No  [facility] PCP: Yes  Transportation: Self Transport  Called PCP first?: No  Feels able to call PCP for urgent problems ?: Yes  Understands what emergencies can be handled by PCP ?: Yes  Practice Contacted Patient ?: No  Pt had ED follow up with pcp/staff ?: No    Reason Patient went to ED instead of Urgent Care or PCP?: [address] ED visit -There was a Personal communication with patient regarding recent ED visit on 8/5/22  Patient stated that he is doing well, Patient declined a follow up with PCP  Patient is  aware of PCP after hour's on-call service, Patient is aware of their nearest [place] [address] urgent care facility, Hospital is closer than pcp and  locations CN education not given   Patient does not meet OPCM criteria

## 2022-09-07 DIAGNOSIS — R73.03 PREDIABETES: ICD-10-CM

## 2022-09-07 DIAGNOSIS — F41.9 ANXIETY: ICD-10-CM

## 2022-09-08 RX ORDER — ALPRAZOLAM 2 MG/1
2 TABLET ORAL DAILY
Qty: 90 TABLET | Refills: 0 | Status: SHIPPED | OUTPATIENT
Start: 2022-09-08

## 2022-09-11 DIAGNOSIS — M54.32 SCIATICA OF LEFT SIDE: ICD-10-CM

## 2022-09-12 NOTE — TELEPHONE ENCOUNTER
Requested Prescriptions     Pending Prescriptions Disp Refills    gabapentin (NEURONTIN) 300 mg capsule [Pharmacy Med Name: GABAPENTIN 300 MG CAPSULE] 60 capsule 2     Sig: TAKE 1 CAPSULE BY MOUTH TWICE A DAY     LOV 7/26/22, F/U non scheduled, Labs active

## 2022-09-13 RX ORDER — GABAPENTIN 300 MG/1
CAPSULE ORAL
Qty: 60 CAPSULE | Refills: 2 | Status: SHIPPED | OUTPATIENT
Start: 2022-09-13

## 2022-10-11 PROBLEM — S81.012D KNEE LACERATION, LEFT, SUBSEQUENT ENCOUNTER: Status: RESOLVED | Noted: 2022-07-26 | Resolved: 2022-10-11

## 2022-11-01 ENCOUNTER — VBI (OUTPATIENT)
Dept: ADMINISTRATIVE | Facility: OTHER | Age: 72
End: 2022-11-01

## 2022-11-09 LAB
ALBUMIN SERPL-MCNC: 4.3 G/DL (ref 3.6–5.1)
ALBUMIN/GLOB SERPL: 1.5 (CALC) (ref 1–2.5)
ALP SERPL-CCNC: 49 U/L (ref 35–144)
ALT SERPL-CCNC: 16 U/L (ref 9–46)
AST SERPL-CCNC: 18 U/L (ref 10–35)
BILIRUB SERPL-MCNC: 0.6 MG/DL (ref 0.2–1.2)
BUN SERPL-MCNC: 20 MG/DL (ref 7–25)
BUN/CREAT SERPL: 16 (CALC) (ref 6–22)
CALCIUM SERPL-MCNC: 9.6 MG/DL (ref 8.6–10.3)
CHLORIDE SERPL-SCNC: 105 MMOL/L (ref 98–110)
CO2 SERPL-SCNC: 30 MMOL/L (ref 20–32)
CREAT SERPL-MCNC: 1.29 MG/DL (ref 0.7–1.28)
GFR/BSA.PRED SERPLBLD CYS-BASED-ARV: 60 ML/MIN/1.73M2
GLOBULIN SER CALC-MCNC: 2.8 G/DL (CALC) (ref 1.9–3.7)
GLUCOSE SERPL-MCNC: 106 MG/DL (ref 65–99)
HBA1C MFR BLD: 5.9 % OF TOTAL HGB
POTASSIUM SERPL-SCNC: 4.8 MMOL/L (ref 3.5–5.3)
PROT SERPL-MCNC: 7.1 G/DL (ref 6.1–8.1)
SODIUM SERPL-SCNC: 138 MMOL/L (ref 135–146)

## 2022-11-21 ENCOUNTER — PATIENT MESSAGE (OUTPATIENT)
Dept: FAMILY MEDICINE CLINIC | Facility: CLINIC | Age: 72
End: 2022-11-21

## 2022-11-21 DIAGNOSIS — M54.42 ACUTE BILATERAL LOW BACK PAIN WITH BILATERAL SCIATICA: Primary | ICD-10-CM

## 2022-11-21 DIAGNOSIS — M54.41 ACUTE BILATERAL LOW BACK PAIN WITH BILATERAL SCIATICA: Primary | ICD-10-CM

## 2022-11-21 DIAGNOSIS — M25.551 RIGHT HIP PAIN: ICD-10-CM

## 2022-11-22 NOTE — TELEPHONE ENCOUNTER
From: Fernando Carrillo  To: Ernestina Mccarty  Sent: 11/21/2022 4:59 PM EST  Subject: Severe r/side hip and lower back pain     I was wondering if you can recommend a Osteo bindu id the [facility]  I'm having severe pain on my right lower back and r/side hip joint  I have a limited range of motion, steps are a pain as is continued walking  Sitting or lying down eases the pain  I am currently usings NSAIDS and Gabapentin  They are not helping much, any more   Thanks: Parth Herring

## 2022-12-16 ENCOUNTER — RA CDI HCC (OUTPATIENT)
Dept: OTHER | Facility: HOSPITAL | Age: 72
End: 2022-12-16

## 2022-12-16 NOTE — PROGRESS NOTES
Tristin RUST [address]  coding opportunities       Chart reviewed, no opportunity found:   [address]        Patients Insurance     Medicare Insurance: [employer]

## 2022-12-17 ENCOUNTER — OFFICE VISIT (OUTPATIENT)
Dept: OBGYN CLINIC | Facility: MEDICAL CENTER | Age: 72
End: 2022-12-17

## 2022-12-17 ENCOUNTER — APPOINTMENT (OUTPATIENT)
Dept: RADIOLOGY | Facility: MEDICAL CENTER | Age: 72
End: 2022-12-17

## 2022-12-17 VITALS
WEIGHT: 291 LBS | DIASTOLIC BLOOD PRESSURE: 87 MMHG | BODY MASS INDEX: 37.35 KG/M2 | SYSTOLIC BLOOD PRESSURE: 124 MMHG | HEIGHT: 74 IN | HEART RATE: 89 BPM

## 2022-12-17 DIAGNOSIS — M25.551 RIGHT HIP PAIN: ICD-10-CM

## 2022-12-17 DIAGNOSIS — M43.16 SPONDYLOLISTHESIS OF LUMBAR REGION: ICD-10-CM

## 2022-12-17 DIAGNOSIS — M54.41 ACUTE BILATERAL LOW BACK PAIN WITH BILATERAL SCIATICA: ICD-10-CM

## 2022-12-17 DIAGNOSIS — M16.0 PRIMARY OSTEOARTHRITIS OF BOTH HIPS: ICD-10-CM

## 2022-12-17 DIAGNOSIS — M47.816 FACET ARTHROPATHY, LUMBAR: Primary | ICD-10-CM

## 2022-12-17 DIAGNOSIS — M51.36 LUMBAR DEGENERATIVE DISC DISEASE: ICD-10-CM

## 2022-12-17 DIAGNOSIS — R29.898 RIGHT LEG WEAKNESS: ICD-10-CM

## 2022-12-17 DIAGNOSIS — M54.42 ACUTE BILATERAL LOW BACK PAIN WITH BILATERAL SCIATICA: ICD-10-CM

## 2022-12-17 RX ORDER — METHYLPREDNISOLONE 4 MG/1
TABLET ORAL
Qty: 1 EACH | Refills: 0 | Status: SHIPPED | OUTPATIENT
Start: 2022-12-17
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
## 2022-12-21 ENCOUNTER — TELEPHONE (OUTPATIENT)
Dept: OBGYN CLINIC | Facility: HOSPITAL | Age: 72
End: 2022-12-21

## 2022-12-21 NOTE — TELEPHONE ENCOUNTER
Caller: Deborah Buckner from Procedure Scheduling    Doctor: Dr Gabby Biggs    Reason for call:   Deborah Buckner calling to confirm that hip injection is going to be a  FL guided injection with anesthesia and steriod  Deborah Buckner asking to speak to clinical team for clarification      CB: [phone]

## 2022-12-22 NOTE — TELEPHONE ENCOUNTER
Spoke to Latosha and advised above    Reached out to patient and gave him contact number for pain mgt to schedule appt

## 2022-12-22 NOTE — TELEPHONE ENCOUNTER
Referral placed to Pain Management department for steroid injection    It is up to them if they decide to use U/S or FL

## 2022-12-22 NOTE — TELEPHONE ENCOUNTER
Caller: Jannette Soto w/ Procedure scheduling         Call regarding :  Returning call      Call was transferred to: triage nurse

## 2022-12-23 ENCOUNTER — TELEPHONE (OUTPATIENT)
Dept: PAIN MEDICINE | Facility: CLINIC | Age: 72
End: 2022-12-23

## 2022-12-23 NOTE — TELEPHONE ENCOUNTER
Caller: Kishor Cordero    Doctor: Kathyrn Kussmaul    Reason for call: Patient called to schedule right hip injection per Dr Caryn Madsen    Patient stated that he is to just have the injection only per Dr Caryn Madsen    pls advise    Call back#: [phone]

## 2022-12-27 ENCOUNTER — OFFICE VISIT (OUTPATIENT)
Dept: FAMILY MEDICINE CLINIC | Facility: CLINIC | Age: 72
End: 2022-12-27

## 2022-12-27 VITALS
DIASTOLIC BLOOD PRESSURE: 72 MMHG | SYSTOLIC BLOOD PRESSURE: 110 MMHG | HEIGHT: 74 IN | BODY MASS INDEX: 37.22 KG/M2 | WEIGHT: 290 LBS | HEART RATE: 84 BPM

## 2022-12-27 DIAGNOSIS — Z12.5 SCREENING FOR PROSTATE CANCER: ICD-10-CM

## 2022-12-27 DIAGNOSIS — Z59.9 FINANCIAL DIFFICULTIES: ICD-10-CM

## 2022-12-27 DIAGNOSIS — R73.03 PREDIABETES: ICD-10-CM

## 2022-12-27 DIAGNOSIS — M54.42 CHRONIC BILATERAL LOW BACK PAIN WITH BILATERAL SCIATICA: ICD-10-CM

## 2022-12-27 DIAGNOSIS — F33.42 RECURRENT MAJOR DEPRESSIVE DISORDER, IN FULL REMISSION (HCC): ICD-10-CM

## 2022-12-27 DIAGNOSIS — E03.9 ACQUIRED HYPOTHYROIDISM: ICD-10-CM

## 2022-12-27 DIAGNOSIS — G89.29 CHRONIC BILATERAL LOW BACK PAIN WITH BILATERAL SCIATICA: ICD-10-CM

## 2022-12-27 DIAGNOSIS — I26.99 BILATERAL PULMONARY EMBOLISM (HCC): ICD-10-CM

## 2022-12-27 DIAGNOSIS — Z00.00 MEDICARE ANNUAL WELLNESS VISIT, SUBSEQUENT: ICD-10-CM

## 2022-12-27 DIAGNOSIS — F41.9 ANXIETY: Primary | ICD-10-CM

## 2022-12-27 DIAGNOSIS — M54.41 CHRONIC BILATERAL LOW BACK PAIN WITH BILATERAL SCIATICA: ICD-10-CM

## 2022-12-27 DIAGNOSIS — F13.90 SEDATIVE, HYPNOTIC, OR ANXIOLYTIC USE, UNSPECIFIED, UNCOMPLICATED: ICD-10-CM

## 2022-12-27 RX ORDER — ALPRAZOLAM 2 MG/1
2 TABLET ORAL DAILY
Qty: 90 TABLET | Refills: 0 | Status: SHIPPED | OUTPATIENT
Start: 2022-12-27

## 2022-12-27 RX ORDER — COVID-19 ANTIGEN TEST
KIT MISCELLANEOUS
COMMUNITY
Start: 2022-12-17

## 2022-12-27 RX ORDER — GABAPENTIN 100 MG/1
100 CAPSULE ORAL 2 TIMES DAILY
Qty: 180 CAPSULE | Refills: 1 | Status: SHIPPED | OUTPATIENT
Start: 2022-12-27

## 2022-12-27 SDOH — ECONOMIC STABILITY - INCOME SECURITY: PROBLEM RELATED TO HOUSING AND ECONOMIC CIRCUMSTANCES, UNSPECIFIED: Z59.9

## 2022-12-27 NOTE — PATIENT INSTRUCTIONS
Medicare Preventive Visit Patient Instructions  Thank you for completing your Welcome to Medicare Visit or Medicare Annual Wellness Visit today  Your next wellness visit will be due in one year (12/28/2023)  The screening/preventive services that you may require over the next 5-10 years are detailed below  Some tests may not apply to you based off risk factors and/or age  Screening tests ordered at today's visit but not completed yet may show as past due  Also, please note that scanned in results may not display below  Preventive Screenings:  Service Recommendations Previous Testing/Comments   Colorectal Cancer Screening  Colonoscopy    Fecal Occult Blood Test (FOBT)/Fecal Immunochemical Test (FIT)  Fecal DNA/Cologuard Test  Flexible Sigmoidoscopy Age: 39-70 years old   Colonoscopy: every 10 years (May be performed more frequently if at higher risk)  OR  FOBT/FIT: every 1 year  OR  Cologuard: every 3 years  OR  Sigmoidoscopy: every 5 years  Screening may be recommended earlier than age 39 if at higher risk for colorectal cancer  Also, an individualized decision between you and your healthcare provider will decide whether screening between the ages of 74-80 would be appropriate   Colonoscopy: 07/22/2021  FOBT/FIT: 01/06/2020  Cologuard: 04/28/2021  Sigmoidoscopy: Not on file    Screening Current     Prostate Cancer Screening Individualized decision between patient and health care provider in men between ages of 53-78   Medicare will cover every 12 months beginning on the day after your 50th birthday PSA: 2 29 ng/mL     Screening Current     Hepatitis C Screening Once for adults born between 1945 and 1965  More frequently in patients at high risk for Hepatitis C Hep C Antibody: Not on file        Diabetes Screening 1-2 times per year if you're at risk for diabetes or have pre-diabetes Fasting glucose: 117 mg/dL (12/27/2019)  A1C: 5 9 % of total Hgb (11/8/2022)  Screening Current   Cholesterol Screening Once every 5 years if you don't have a lipid disorder  May order more often based on risk factors  Lipid panel: 05/12/2022  Screening Current      Other Preventive Screenings Covered by Medicare:  Abdominal Aortic Aneurysm (AAA) Screening: covered once if your at risk  You're considered to be at risk if you have a family history of AAA or a male between the age of 73-68 who smoking at least 100 cigarettes in your lifetime  Lung Cancer Screening: covers low dose CT scan once per year if you meet all of the following conditions: (1) Age 50-69; (2) No signs or symptoms of lung cancer; (3) Current smoker or have quit smoking within the last 15 years; (4) You have a tobacco smoking history of at least 20 pack years (packs per day x number of years you smoked); (5) You get a written order from a healthcare provider  Glaucoma Screening: covered annually if you're considered high risk: (1) You have diabetes OR (2) Family history of glaucoma OR (3)  aged 48 and older OR (3)  American aged 72 and older  Osteoporosis Screening: covered every 2 years if you meet one of the following conditions: (1) Have a vertebral abnormality; (2) On glucocorticoid therapy for more than 3 months; (3) Have primary hyperparathyroidism; (4) On osteoporosis medications and need to assess response to drug therapy  HIV Screening: covered annually if you're between the age of 12-76  Also covered annually if you are younger than 13 and older than 72 with risk factors for HIV infection  For pregnant patients, it is covered up to 3 times per pregnancy      Immunizations:  Immunization Recommendations   Influenza Vaccine Annual influenza vaccination during flu season is recommended for all persons aged >= 6 months who do not have contraindications   Pneumococcal Vaccine   * Pneumococcal conjugate vaccine = PCV13 (Prevnar 13), PCV15 (Vaxneuvance), PCV20 (Prevnar 20)  * Pneumococcal polysaccharide vaccine = PPSV23 (Pneumovax) Adults 19-64 years old: 1-3 doses may be recommended based on certain risk factors  Adults 72 years old: 1-2 doses may be recommended based off what pneumonia vaccine you previously received   Hepatitis B Vaccine 3 dose series if at intermediate or high risk (ex: diabetes, end stage renal disease, liver disease)   Tetanus (Td) Vaccine - COST NOT COVERED BY MEDICARE PART B Following completion of primary series, a booster dose should be given every 10 years to maintain immunity against tetanus  Td may also be given as tetanus wound prophylaxis  Tdap Vaccine - COST NOT COVERED BY MEDICARE PART B Recommended at least once for all adults  For pregnant patients, recommended with each pregnancy  Shingles Vaccine (Shingrix) - COST NOT COVERED BY MEDICARE PART B  2 shot series recommended in those aged 48 and above     Health Maintenance Due:      Topic Date Due    Hepatitis C Screening  Never done    Colorectal Cancer Screening  07/21/2026     Immunizations Due:      Topic Date Due    Hepatitis B Vaccine (1 of 3 - 3-dose series) Never done    COVID-19 Vaccine (3 - Booster for Pfizer series) 06/02/2021    Influenza Vaccine (1) 09/01/2022     Advance Directives   What are advance directives? Advance directives are legal documents that state your wishes and plans for medical care  These plans are made ahead of time in case you lose your ability to make decisions for yourself  Advance directives can apply to any medical decision, such as the treatments you want, and if you want to donate organs  What are the types of advance directives? There are many types of advance directives, and each state has rules about how to use them  You may choose a combination of any of the following:  Living will: This is a written record of the treatment you want  You can also choose which treatments you do not want, which to limit, and which to stop at a certain time  This includes surgery, medicine, IV fluid, and tube feedings     Durable power [employer]  for [facility]): This is a written record that states who you want to make healthcare choices for you when you are unable to make them for yourself  This person, called a proxy, is usually a family member or a friend  You may choose more than 1 proxy  Do not resuscitate (DNR) order:  A DNR order is used in case your heart stops beating or you stop breathing  It is a request not to have certain forms of treatment, such as CPR  A DNR order may be included in other types of advance directives  Medical directive: This covers the care that you want if you are in a coma, near death, or unable to make decisions for yourself  You can list the treatments you want for each condition  Treatment may include pain medicine, surgery, blood transfusions, dialysis, IV or tube feedings, and a ventilator (breathing machine)  Values history: This document has questions about your views, beliefs, and how you feel and think about life  This information can help others choose the care that you would choose  Why are advance directives important? An advance directive helps you control your care  Although spoken wishes may be used, it is better to have your wishes written down  Spoken wishes can be misunderstood, or not followed  Treatments may be given even if you do not want them  An advance directive may make it easier for your family to make difficult choices about your care  Weight Management   Why it is important to manage your weight:  Being overweight increases your risk of health conditions such as heart disease, high blood pressure, type 2 diabetes, and certain types of cancer  It can also increase your risk for osteoarthritis, sleep apnea, and other respiratory problems  Aim for a slow, steady weight loss  Even a small amount of weight loss can lower your risk of health problems  How to lose weight safely:  A safe and healthy way to lose weight is to eat fewer calories and get regular exercise   You can lose up about 1 pound a week by decreasing the number of calories you eat by 500 calories each day  Healthy meal plan for weight management:  A healthy meal plan includes a variety of foods, contains fewer calories, and helps you stay healthy  A healthy meal plan includes the following:  Eat whole-grain foods more often  A healthy meal plan should contain fiber  Fiber is the part of grains, fruits, and vegetables that is not broken down by your body  Whole-grain foods are healthy and provide extra fiber in your diet  Some examples of whole-grain foods are whole-wheat breads and pastas, oatmeal, brown rice, and bulgur  Eat a variety of vegetables every day  Include dark, leafy greens such as spinach, kale, joseph greens, and mustard greens  Eat yellow and orange vegetables such as carrots, sweet potatoes, and winter squash  Eat a variety of fruits every day  Choose fresh or canned fruit (canned in its own juice or light syrup) instead of juice  Fruit juice has very little or no fiber  Eat low-fat dairy foods  Drink fat-free (skim) milk or 1% milk  Eat fat-free yogurt and low-fat cottage cheese  Try low-fat cheeses such as mozzarella and other reduced-fat cheeses  Choose meat and other protein foods that are low in fat  Choose beans or other legumes such as split peas or lentils  Choose fish, skinless poultry (chicken or turkey), or lean cuts of red meat (beef or pork)  Before you cook meat or poultry, cut off any visible fat  Use less fat and oil  Try baking foods instead of frying them  Add less fat, such as margarine, sour cream, regular salad dressing and mayonnaise to foods  Eat fewer high-fat foods  Some examples of high-fat foods include french fries, doughnuts, ice cream, and cakes  Eat fewer sweets  Limit foods and drinks that are high in sugar  This includes candy, cookies, regular soda, and sweetened drinks  Exercise:  Exercise at least 30 minutes per day on most days of the week   Some examples of exercise include walking, biking, dancing, and swimming  You can also fit in more physical activity by taking the stairs instead of the elevator or parking farther away from stores  Ask your healthcare provider about the best exercise plan for you  © Copyright [employer] 2018 Information is for End User's use only and may not be sold, redistributed or otherwise used for commercial purposes   All illustrations and images included in CareNotes® are the copyrighted property of A D A M , Inc  or [address] [place] [address]

## 2022-12-27 NOTE — ASSESSMENT & PLAN NOTE
Contract and UDS collected today  Controlled Substance Review    [place] or [place]  reviewed: No red flags were identified; safe to proceed with prescription       Continue celexa and xanax

## 2022-12-27 NOTE — ASSESSMENT & PLAN NOTE
Will be seeing pain management  Has MRI scheduled  Currently on gabapentin can increase to 400mg  Will also be looking into physical therapy
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
## 2023-01-15 ENCOUNTER — HOSPITAL ENCOUNTER (OUTPATIENT)
Dept: MRI IMAGING | Facility: HOSPITAL | Age: 73
Discharge: HOME | End: 2023-01-15
Attending: EMERGENCY MEDICINE

## 2023-01-15 DIAGNOSIS — M43.16 SPONDYLOLISTHESIS OF LUMBAR REGION: ICD-10-CM

## 2023-01-15 DIAGNOSIS — M54.41 ACUTE BILATERAL LOW BACK PAIN WITH BILATERAL SCIATICA: ICD-10-CM

## 2023-01-15 DIAGNOSIS — M54.42 ACUTE BILATERAL LOW BACK PAIN WITH BILATERAL SCIATICA: ICD-10-CM

## 2023-01-15 DIAGNOSIS — M25.551 RIGHT HIP PAIN: ICD-10-CM

## 2023-01-15 DIAGNOSIS — M51.36 LUMBAR DEGENERATIVE DISC DISEASE: ICD-10-CM

## 2023-01-15 DIAGNOSIS — M47.816 FACET ARTHROPATHY, LUMBAR: ICD-10-CM

## 2023-01-15 DIAGNOSIS — M16.0 PRIMARY OSTEOARTHRITIS OF BOTH HIPS: ICD-10-CM

## 2023-01-31 ENCOUNTER — HOSPITAL ENCOUNTER (OUTPATIENT)
Dept: RADIOLOGY | Facility: CLINIC | Age: 73
Discharge: HOME | End: 2023-01-31

## 2023-01-31 VITALS
OXYGEN SATURATION: 95 % | DIASTOLIC BLOOD PRESSURE: 81 MMHG | HEART RATE: 71 BPM | TEMPERATURE: 97.9 F | SYSTOLIC BLOOD PRESSURE: 133 MMHG | RESPIRATION RATE: 20 BRPM

## 2023-01-31 DIAGNOSIS — M16.0 PRIMARY OSTEOARTHRITIS OF BOTH HIPS: ICD-10-CM

## 2023-01-31 RX ORDER — BUPIVACAINE HCL/PF 2.5 MG/ML
4 VIAL (ML) INJECTION ONCE
Status: COMPLETED | OUTPATIENT
Start: 2023-01-31 | End: 2023-01-31

## 2023-01-31 RX ORDER — METHYLPREDNISOLONE ACETATE 80 MG/ML
80 INJECTION, SUSPENSION INTRA-ARTICULAR; INTRALESIONAL; INTRAMUSCULAR; PARENTERAL; SOFT TISSUE ONCE
Status: COMPLETED | OUTPATIENT
Start: 2023-01-31 | End: 2023-01-31

## 2023-01-31 RX ADMIN — IOHEXOL 1 ML: 300 INJECTION, SOLUTION INTRAVENOUS at 10:34

## 2023-01-31 RX ADMIN — METHYLPREDNISOLONE ACETATE 80 MG: 80 INJECTION, SUSPENSION INTRA-ARTICULAR; INTRALESIONAL; INTRAMUSCULAR; PARENTERAL; SOFT TISSUE at 10:35

## 2023-01-31 RX ADMIN — Medication 4 ML: at 10:35

## 2023-01-31 NOTE — DISCHARGE INSTR - LAB
Steroid Joint Injection   WHAT YOU NEED TO KNOW:   A steroid joint injection is a procedure to inject steroid medicine into a joint  Steroid medicine decreases pain and inflammation  The injection may also contain an anesthetic (numbing medicine) to decrease pain  It may be done to treat conditions such as arthritis, gout, or carpal tunnel syndrome  The injections may be given in your knee, ankle, shoulder, elbow, wrist, ankle or sacroiliac joint  Do not apply heat to any area that is numb  If you have discomfort or soreness at the injection site, you may apply ice today, 20 minutes on and 20 minutes off  Tomorrow you may use ice or warm, moist heat  Do not apply ice or heat directly to the skin  You may have an increase or change in the discomfort for 36-48 hours after your treatment  Apply ice and continue with any pain medicine you have been prescribed  Do not do anything strenuous today  You may shower, but no tub baths or hot tubs today  You may resume your normal activities tomorrow, but do not “overdo it”  Resume normal activities slowly when you are feeling better  If you experience redness, drainage or swelling at the injection site, or if you develop a fever above 100 degrees, please call [facility] at [phone] or go to the Emergency Room  Continue to take all routine medicines prescribed by your primary care physician unless otherwise instructed by our staff  Most blood thinners should be started again according to your regularly scheduled dosing  If you have any questions, please give our office a call  As no general anesthesia was used in today's procedure, you should not experience any side effects related to anesthesia  If you are diabetic, the steroids used in today's injection may temporarily increase your blood sugar levels after the first few days after your injection   Please keep a close eye on your sugars and alert the doctor who manages your diabetes if your sugars are significantly high from your baseline or you are symptomatic  If you have a problem specifically related to your procedure, please call our office at [phone]  Problems not related to your procedure should be directed to your primary care physician

## 2023-01-31 NOTE — H&P
History of Present Illness: The patient is a 81 y o  male who presents with complaints of right hip pain    Past Medical History:   Diagnosis Date   • Back injury    • Chronic back pain    • Depression 9/16/2016   • Diabetes mellitus ([facility] [address] )    • Pulmonary embolism (HCC)    • SOB (shortness of breath)    • Thyroid disease        Past Surgical History:   Procedure Laterality Date   • KNEE SURGERY     • TONSILLECTOMY           Current Outpatient Medications:   •  ALPRAZolam (XANAX) 2 MG tablet, Take 1 tablet (2 mg total) by mouth in the morning, Disp: 90 tablet, Rfl: 0  •  citalopram (CeleXA) 40 mg tablet, TAKE 1 TABLET BY MOUTH EVERY DAY, Disp: 90 tablet, Rfl: 1  •  Eliquis 5 MG, TAKE 1 TABLET BY MOUTH TWICE A DAY, Disp: 180 tablet, Rfl: 1  •  Flowflex COVID-19 Ag Home Test KIT, Use as directed, Disp: , Rfl:   •  gabapentin (Neurontin) 100 mg capsule, Take 1 capsule (100 mg total) by mouth 2 (two) times a day Take with 300mg for total dose of 400mg BID , Disp: 180 capsule, Rfl: 1  •  gabapentin (NEURONTIN) 300 mg capsule, TAKE 1 CAPSULE BY MOUTH TWICE A DAY, Disp: 60 capsule, Rfl: 2  •  Influenza Virus Vacc Split PF 0 5 ML Dahiana GOLDMAN 1049-4548(PF) 45 mcg (15 mcg x 3)/0 5 mL intramuscular syringe  inject 0 5 milliliter intramuscularly, Disp: , Rfl:   •  levothyroxine 125 mcg tablet, TAKE 1 TABLET (125 MCG TOTAL) BY MOUTH DAILY IN THE EARLY MORNING, Disp: 90 tablet, Rfl: 1  •  metFORMIN (GLUCOPHAGE) 500 mg tablet, Take 1 tablet (500 mg total) by mouth daily, Disp: 90 tablet, Rfl: 0  •  methylPREDNISolone 4 MG tablet therapy pack, Use as directed on package (Patient not taking: Reported on 12/27/2022), Disp: 1 each, Rfl: 0    No Known Allergies    Physical Exam: There were no vitals filed for this visit    General: Awake, Alert, Oriented x 3, Mood and affect appropriate  Respiratory: Respirations even and unlabored  Cardiovascular: Peripheral pulses intact; no edema  Musculoskeletal Exam: pain with right hip flexion    ASA Score: 3         Assessment:   1   Primary osteoarthritis of both hips        Plan: Right hip steroid injection per Dr Caio Santos

## 2023-02-07 DIAGNOSIS — R73.03 PREDIABETES: ICD-10-CM

## 2023-02-19 DIAGNOSIS — E03.9 ACQUIRED HYPOTHYROIDISM: ICD-10-CM

## 2023-02-19 DIAGNOSIS — F41.9 ANXIETY: ICD-10-CM

## 2023-02-19 DIAGNOSIS — I26.99 BILATERAL PULMONARY EMBOLISM (HCC): ICD-10-CM

## 2023-02-20 RX ORDER — LEVOTHYROXINE SODIUM 0.12 MG/1
125 TABLET ORAL
Qty: 90 TABLET | Refills: 1 | Status: SHIPPED | OUTPATIENT
Start: 2023-02-20

## 2023-02-20 RX ORDER — CITALOPRAM 40 MG/1
TABLET ORAL
Qty: 90 TABLET | Refills: 1 | Status: SHIPPED | OUTPATIENT
Start: 2023-02-20

## 2023-02-20 RX ORDER — APIXABAN 5 MG/1
TABLET, FILM COATED ORAL
Qty: 180 TABLET | Refills: 1 | Status: SHIPPED | OUTPATIENT
Start: 2023-02-20

## 2023-03-26 ENCOUNTER — HOSPITAL ENCOUNTER (EMERGENCY)
Facility: HOSPITAL | Age: 73
Discharge: HOME | End: 2023-03-26
Attending: EMERGENCY MEDICINE

## 2023-03-26 ENCOUNTER — APPOINTMENT (EMERGENCY)
Dept: RADIOLOGY | Facility: HOSPITAL | Age: 73
End: 2023-03-26

## 2023-03-26 VITALS
WEIGHT: 290 LBS | HEART RATE: 104 BPM | BODY MASS INDEX: 37.22 KG/M2 | HEIGHT: 74 IN | TEMPERATURE: 97.8 F | SYSTOLIC BLOOD PRESSURE: 161 MMHG | RESPIRATION RATE: 20 BRPM | OXYGEN SATURATION: 97 % | DIASTOLIC BLOOD PRESSURE: 84 MMHG

## 2023-03-26 DIAGNOSIS — J06.9 VIRAL URI WITH COUGH: Primary | ICD-10-CM

## 2023-03-26 DIAGNOSIS — J20.9 ACUTE BRONCHITIS: ICD-10-CM

## 2023-03-26 LAB
ALBUMIN SERPL BCP-MCNC: 4.4 G/DL (ref 3.5–5)
ALP SERPL-CCNC: 60 U/L (ref 34–104)
ALT SERPL W P-5'-P-CCNC: 18 U/L (ref 7–52)
ANION GAP SERPL CALCULATED.3IONS-SCNC: 9 MMOL/L (ref 4–13)
APTT PPP: 29 SECONDS (ref 23–37)
AST SERPL W P-5'-P-CCNC: 19 U/L (ref 13–39)
ATRIAL RATE: 94 BPM
BASOPHILS # BLD AUTO: 0.03 THOUSANDS/ÂΜL (ref 0–0.1)
BASOPHILS NFR BLD AUTO: 0 % (ref 0–1)
BILIRUB SERPL-MCNC: 0.61 MG/DL (ref 0.2–1)
BUN SERPL-MCNC: 15 MG/DL (ref 5–25)
CALCIUM SERPL-MCNC: 9.2 MG/DL (ref 8.4–10.2)
CARDIAC TROPONIN I PNL SERPL HS: 3 NG/L
CHLORIDE SERPL-SCNC: 103 MMOL/L (ref 96–108)
CO2 SERPL-SCNC: 23 MMOL/L (ref 21–32)
CREAT SERPL-MCNC: 1.09 MG/DL (ref 0.6–1.3)
EOSINOPHIL # BLD AUTO: 0.22 THOUSAND/ÂΜL (ref 0–0.61)
EOSINOPHIL NFR BLD AUTO: 2 % (ref 0–6)
ERYTHROCYTE [DISTWIDTH] IN BLOOD BY AUTOMATED COUNT: 12.3 % (ref 11.6–15.1)
FLUAV RNA RESP QL NAA+PROBE: NEGATIVE
FLUBV RNA RESP QL NAA+PROBE: NEGATIVE
GFR SERPL CREATININE-BSD FRML MDRD: 67 ML/MIN/1.73SQ M
GLUCOSE SERPL-MCNC: 148 MG/DL (ref 65–140)
HCT VFR BLD AUTO: 43.2 % (ref 36.5–49.3)
HGB BLD-MCNC: 14.9 G/DL (ref 12–17)
IMM GRANULOCYTES # BLD AUTO: 0.02 THOUSAND/UL (ref 0–0.2)
IMM GRANULOCYTES NFR BLD AUTO: 0 % (ref 0–2)
INR PPP: 1.2 (ref 0.84–1.19)
LYMPHOCYTES # BLD AUTO: 2.4 THOUSANDS/ÂΜL (ref 0.6–4.47)
LYMPHOCYTES NFR BLD AUTO: 26 % (ref 14–44)
MCH RBC QN AUTO: 31.1 PG (ref 26.8–34.3)
MCHC RBC AUTO-ENTMCNC: 34.5 G/DL (ref 31.4–37.4)
MCV RBC AUTO: 90 FL (ref 82–98)
MONOCYTES # BLD AUTO: 0.7 THOUSAND/ÂΜL (ref 0.17–1.22)
MONOCYTES NFR BLD AUTO: 8 % (ref 4–12)
NEUTROPHILS # BLD AUTO: 5.74 THOUSANDS/ÂΜL (ref 1.85–7.62)
NEUTS SEG NFR BLD AUTO: 64 % (ref 43–75)
NRBC BLD AUTO-RTO: 0 /100 WBCS
P AXIS: 38 DEGREES
PLATELET # BLD AUTO: 241 THOUSANDS/UL (ref 149–390)
PMV BLD AUTO: 8.5 FL (ref 8.9–12.7)
POTASSIUM SERPL-SCNC: 4.1 MMOL/L (ref 3.5–5.3)
PR INTERVAL: 154 MS
PROT SERPL-MCNC: 7.6 G/DL (ref 6.4–8.4)
PROTHROMBIN TIME: 14.9 SECONDS (ref 11.6–14.5)
QRS AXIS: -33 DEGREES
QRSD INTERVAL: 80 MS
QT INTERVAL: 334 MS
QTC INTERVAL: 417 MS
RBC # BLD AUTO: 4.79 MILLION/UL (ref 3.88–5.62)
RSV RNA RESP QL NAA+PROBE: NEGATIVE
S PYO DNA THROAT QL NAA+PROBE: NOT DETECTED
SARS-COV-2 RNA RESP QL NAA+PROBE: NEGATIVE
SODIUM SERPL-SCNC: 135 MMOL/L (ref 135–147)
T WAVE AXIS: -8 DEGREES
VENTRICULAR RATE: 94 BPM
WBC # BLD AUTO: 9.11 THOUSAND/UL (ref 4.31–10.16)

## 2023-03-26 RX ORDER — IPRATROPIUM BROMIDE AND ALBUTEROL SULFATE 2.5; .5 MG/3ML; MG/3ML
3 SOLUTION RESPIRATORY (INHALATION) ONCE
Status: COMPLETED | OUTPATIENT
Start: 2023-03-26 | End: 2023-03-26

## 2023-03-26 RX ORDER — ACETAMINOPHEN 325 MG/1
650 TABLET ORAL ONCE
Status: COMPLETED | OUTPATIENT
Start: 2023-03-26 | End: 2023-03-26

## 2023-03-26 RX ORDER — GUAIFENESIN 600 MG/1
600 TABLET, EXTENDED RELEASE ORAL ONCE
Status: COMPLETED | OUTPATIENT
Start: 2023-03-26 | End: 2023-03-26

## 2023-03-26 RX ORDER — ALBUTEROL SULFATE 90 UG/1
2 AEROSOL, METERED RESPIRATORY (INHALATION) ONCE
Status: COMPLETED | OUTPATIENT
Start: 2023-03-26 | End: 2023-03-26

## 2023-03-26 RX ORDER — PREDNISONE 20 MG/1
40 TABLET ORAL DAILY
Qty: 8 TABLET | Refills: 0 | Status: SHIPPED | OUTPATIENT
Start: 2023-03-27 | End: 2023-03-31

## 2023-03-26 RX ADMIN — GUAIFENESIN 600 MG: 600 TABLET ORAL at 07:43

## 2023-03-26 RX ADMIN — PREDNISONE 50 MG: 20 TABLET ORAL at 09:03

## 2023-03-26 RX ADMIN — ALBUTEROL SULFATE 2 PUFF: 90 AEROSOL, METERED RESPIRATORY (INHALATION) at 07:43

## 2023-03-26 RX ADMIN — SODIUM CHLORIDE 500 ML: 0.9 INJECTION, SOLUTION INTRAVENOUS at 07:37

## 2023-03-26 RX ADMIN — IPRATROPIUM BROMIDE AND ALBUTEROL SULFATE 3 ML: 2.5; .5 SOLUTION RESPIRATORY (INHALATION) at 07:43

## 2023-03-26 RX ADMIN — ACETAMINOPHEN 650 MG: 325 TABLET ORAL at 07:43

## 2023-03-26 NOTE — ED PROVIDER NOTES
History  Chief Complaint   Patient presents with   • Cough     Patient c/o lingering cough x 10 days  Per patient, he had viral illness tested negative for covid last week  Denies CP/SOB  Patient is a 72-year-old male with past medical history of hypothyroidism, prediabetes, prior PE 3 years ago currently on Eliquis, chronic back pain, presents to the emergency department for a productive cough over the past 10 days  Patient states that he started with a cough about 10 days ago which was associated with fever for 2 days, Tmax 102 °F   The fever had resolved and the cough was improving up until late last week where he started to feel as though the cough was getting worse again  Cough is productive of yellow or white phlegm and he reports occasional chest tightness/shortness of breath  He reports some chest discomfort when coughing but denies chest pain otherwise  He reports during coughing fits he sometimes gets lightheaded but then it resolves and he denies any dizziness or lightheadedness when he is not having a coughing fit  Patient reports sore throat and states that his symptoms initially started with a sore throat  He also reports runny nose and nasal congestion  He denies any associated hemoptysis, recurrent fever, headache, neck pain or stiffness, difficulty swallowing, palpitations, abdominal pain, nausea, vomiting, diarrhea, urinary symptoms, skin rash or color change, leg swelling or pain, extremity weakness or paresthesia or other focal neurologic deficits  Patient states he took a home COVID test early in his illness course which was negative  He denies history of asthma or COPD and has a very remote smoking history (quit >30 years ago)        History provided by:  Patient   used: No    Cough  Associated symptoms: rhinorrhea and sore throat    Associated symptoms: no chest pain, no chills, no ear pain, no fever, no headaches, no rash, no shortness of breath and no wheezing        Prior to Admission Medications   Prescriptions Last Dose Informant Patient Reported? Taking? ALPRAZolam (XANAX) 2 MG tablet   No No   Sig: Take 1 tablet (2 mg total) by mouth in the morning   Eliquis 5 MG   No No   Sig: TAKE 1 TABLET BY MOUTH TWICE A DAY   Flowflex COVID-19 Ag Home Test KIT   Yes No   Sig: Use as directed   Influenza Virus Vacc Split PF 0 5 ML SUSI   Yes No   Sig: Afluria 3213-0577(PF) 45 mcg (15 mcg x 3)/0 5 mL intramuscular syringe   inject 0 5 milliliter intramuscularly   citalopram (CeleXA) 40 mg tablet   No No   Sig: TAKE 1 TABLET BY MOUTH EVERY DAY   gabapentin (NEURONTIN) 300 mg capsule   No No   Sig: TAKE 1 CAPSULE BY MOUTH TWICE A DAY   gabapentin (Neurontin) 100 mg capsule   No No   Sig: Take 1 capsule (100 mg total) by mouth 2 (two) times a day Take with 300mg for total dose of 400mg BID    levothyroxine 125 mcg tablet   No No   Sig: TAKE 1 TABLET (125 MCG TOTAL) BY MOUTH DAILY IN THE EARLY MORNING   metFORMIN (GLUCOPHAGE) 500 mg tablet   No No   Sig: TAKE 1 TABLET (500 MG TOTAL) BY MOUTH DAILY  methylPREDNISolone 4 MG tablet therapy pack   No No   Sig: Use as directed on package   Patient not taking: Reported on 12/27/2022      Facility-Administered Medications: None       Past Medical History:   Diagnosis Date   • Back injury    • Chronic back pain    • Depression 9/16/2016   • Diabetes mellitus ([facility] [address] )    • Pulmonary embolism (HCC)    • SOB (shortness of breath)    • Thyroid disease        Past Surgical History:   Procedure Laterality Date   • KNEE SURGERY     • TONSILLECTOMY         Family History   Problem Relation Age of Onset   • Hypothyroidism Mother    • Mental illness Mother         disorder   • Stroke Mother    • Hypothyroidism Sister    • Cancer Sister      I have reviewed and agree with the history as documented      E-Cigarette/Vaping   • E-Cigarette Use Never User      E-Cigarette/Vaping Substances   • Nicotine No    • THC No    • CBD No    • Flavoring No • Other No    • Unknown No      Social History     Tobacco Use   • Smoking status: Former     Packs/day: 0 50     Years: 35 00     Pack years: 17 50     Types: Cigarettes     Start date:      Quit date:      Years since quittin 2   • Smokeless tobacco: Never   Vaping Use   • Vaping Use: Never used   Substance Use Topics   • Alcohol use: Never     Comment: occasional  1 drink/wk    • Drug use: Not Currently     Frequency: 14 0 times per week     Types: Marijuana     Comment: medical marijuana since november       Review of Systems   Constitutional: Negative for chills and fever  HENT: Positive for congestion, rhinorrhea and sore throat  Negative for ear pain  Respiratory: Positive for cough and chest tightness  Negative for shortness of breath and wheezing  Cardiovascular: Negative for chest pain and palpitations  Gastrointestinal: Negative for abdominal pain, constipation, diarrhea, nausea and vomiting  Genitourinary: Negative for dysuria, flank pain, frequency and hematuria  Musculoskeletal: Negative for back pain, neck pain and neck stiffness  Skin: Negative for color change, pallor, rash and wound  Allergic/Immunologic: Negative for immunocompromised state  Neurological: Negative for dizziness, syncope, weakness, light-headedness, numbness and headaches  Hematological: Negative for adenopathy  Bruises/bleeds easily  Psychiatric/Behavioral: Negative for confusion and decreased concentration  All other systems reviewed and are negative  Physical Exam  Physical Exam  Vitals and nursing note reviewed  Constitutional:       General: He is not in acute distress  Appearance: Normal appearance  He is well-developed  He is not ill-appearing, toxic-appearing or diaphoretic  HENT:      Head: Normocephalic and atraumatic  Right Ear: External ear normal       Left Ear: External ear normal       Nose: Congestion present        Mouth/Throat:      Mouth: Mucous membranes are moist       Pharynx: Oropharynx is clear  Posterior oropharyngeal erythema present  Comments: No significant tonsillar hypertrophy or exudate  Posterior pharyngeal erythema noted  No palatal petechiae  Uvula midline  Eyes:      Extraocular Movements: Extraocular movements intact  Conjunctiva/sclera: Conjunctivae normal    Neck:      Vascular: No JVD  Cardiovascular:      Rate and Rhythm: Normal rate and regular rhythm  Pulses: Normal pulses  Heart sounds: Normal heart sounds  No murmur heard  No friction rub  No gallop  Pulmonary:      Effort: Pulmonary effort is normal  No respiratory distress  Breath sounds: No wheezing, rhonchi or rales  Comments: Coarse breath sounds throughout  Abdominal:      General: There is no distension  Palpations: Abdomen is soft  Tenderness: There is no abdominal tenderness  There is no guarding or rebound  Musculoskeletal:         General: No swelling or tenderness  Normal range of motion  Cervical back: Normal range of motion and neck supple  No rigidity  Skin:     General: Skin is warm and dry  Coloration: Skin is not pale  Findings: No erythema or rash  Neurological:      General: No focal deficit present  Mental Status: He is alert and oriented to person, place, and time  Sensory: No sensory deficit  Motor: No weakness     Psychiatric:         Mood and Affect: Mood normal          Behavior: Behavior normal          Vital Signs  ED Triage Vitals   Temperature Pulse Respirations Blood Pressure SpO2   03/26/23 0706 03/26/23 0706 03/26/23 0706 03/26/23 0706 03/26/23 0706   97 8 °F (36 6 °C) 104 20 161/84 97 %      Temp Source Heart Rate Source Patient Position - Orthostatic VS BP Location FiO2 (%)   03/26/23 0706 03/26/23 0706 -- 03/26/23 0706 --   Tympanic Monitor  Left arm       Pain Score       03/26/23 0743       Med Not Given for Pain - for MAR use only         Vitals:    03/26/23 0706 "  BP: 161/84   BP Location: Left arm   Pulse: 104   Resp: 20   Temp: 97 8 °F (36 6 °C)   TempSrc: Tympanic   SpO2: 97%   Weight: 132 kg (290 lb)   Height: 6' 2\" (1 88 m)       Visual Acuity      ED Medications  Medications   predniSONE tablet 50 mg (has no administration in time range)   sodium chloride 0 9 % bolus 500 mL (0 mL Intravenous Stopped 3/26/23 0842)   ipratropium-albuterol (DUO-NEB) 0 5-2 5 mg/3 mL inhalation solution 3 mL (3 mL Nebulization Given 3/26/23 0743)   albuterol (PROVENTIL HFA,VENTOLIN HFA) inhaler 2 puff (2 puffs Inhalation Given 3/26/23 0743)   guaiFENesin (MUCINEX) 12 hr tablet 600 mg (600 mg Oral Given 3/26/23 0743)   acetaminophen (TYLENOL) tablet 650 mg (650 mg Oral Given 3/26/23 0743)       Diagnostic Studies  Results Reviewed     Procedure Component Value Units Date/Time    Protime-INR [[id number]]  (Abnormal) Collected: 03/26/23 0735    Lab Status: Final result Specimen: Blood from Arm, Right Updated: 03/26/23 0825     Protime 14 9 seconds      INR 1 20    APTT [[id number]]  (Normal) Collected: 03/26/23 0735    Lab Status: Final result Specimen: Blood from Arm, Right Updated: 03/26/23 0825     PTT 29 seconds     FLU/RSV/COVID - if FLU/RSV clinically relevant [[id number]]  (Normal) Collected: 03/26/23 0735    Lab Status: Final result Specimen: Nares from Nose Updated: 03/26/23 0823     SARS-CoV-2 Negative     INFLUENZA A PCR Negative     INFLUENZA B PCR Negative     RSV PCR Negative    Narrative:      FOR PEDIATRIC PATIENTS - copy/paste COVID Guidelines URL to browser: [email or web address]    SARS-CoV-2 assay is a Nucleic Acid Amplification assay intended for the  qualitative detection of nucleic acid from SARS-CoV-2 in nasopharyngeal  swabs  Results are for the presumptive identification of SARS-CoV-2 RNA      Positive results are indicative of infection with SARS-CoV-2, the virus  causing COVID-19, but do not rule out bacterial " infection or co-infection  with other viruses  Laboratories within the [place] and its  territories are required to report all positive results to the appropriate  public health authorities  Negative results do not preclude SARS-CoV-2  infection and should not be used as the sole basis for treatment or other  patient management decisions  Negative results must be combined with  clinical observations, patient history, and epidemiological information  This test has not been FDA cleared or approved  This test has been authorized by FDA under an Emergency Use Authorization  (EUA)  This test is only authorized for the duration of time the  declaration that circumstances exist justifying the authorization of the  emergency use of an in vitro diagnostic tests for detection of SARS-CoV-2  virus and/or diagnosis of COVID-19 infection under section 564(b)(1) of  the Act, 21 U  S C  600QYB-4(R)(6), unless the authorization is terminated  or revoked sooner  The test has been validated but independent review by FDA  and CLIA is pending  Test performed using [employer] GeneXpert: This RT-PCR assay targets N2,  a region unique to SARS-CoV-2  A conserved region in the E-gene was chosen  for pan-Sarbecovirus detection which includes SARS-CoV-2  According to CMS-2020-01-R, this platform meets the definition of high-throughput technology      HS Troponin 0hr (reflex protocol) [[id number]]  (Normal) Collected: 03/26/23 0735    Lab Status: Final result Specimen: Blood from Arm, Right Updated: 03/26/23 0811     hs TnI 0hr 3 ng/L     Strep A PCR [[id number]]  (Normal) Collected: 03/26/23 0735    Lab Status: Final result Specimen: Throat Updated: 03/26/23 0810     STREP A PCR Not Detected    Comprehensive metabolic panel [[id number]]  (Abnormal) Collected: 03/26/23 0735    Lab Status: Final result Specimen: Blood from Arm, Right Updated: 03/26/23 0802     Sodium 135 mmol/L      Potassium 4 1 mmol/L      Chloride 103 mmol/L      CO2 23 mmol/L      ANION GAP 9 mmol/L      BUN 15 mg/dL      Creatinine 1 09 mg/dL      Glucose 148 mg/dL      Calcium 9 2 mg/dL      AST 19 U/L      ALT 18 U/L      Alkaline Phosphatase 60 U/L      Total Protein 7 6 g/dL      Albumin 4 4 g/dL      Total Bilirubin 0 61 mg/dL      eGFR 67 ml/min/1 73sq m     Narrative:      [place] guidelines for Chronic Kidney Disease (CKD):   •  Stage 1 with normal or high GFR (GFR > 90 mL/min/1 73 square meters)  •  Stage 2 Mild CKD (GFR = 60-89 mL/min/1 73 square meters)  •  Stage 3A Moderate CKD (GFR = 45-59 mL/min/1 73 square meters)  •  Stage 3B Moderate CKD (GFR = 30-44 mL/min/1 73 square meters)  •  Stage 4 Severe CKD (GFR = 15-29 mL/min/1 73 square meters)  •  Stage 5 End Stage CKD (GFR <15 mL/min/1 73 square meters)  Note: GFR calculation is accurate only with a steady state creatinine    CBC and differential [[id number]]  (Abnormal) Collected: 03/26/23 0735    Lab Status: Final result Specimen: Blood from Arm, Right Updated: 03/26/23 0744     WBC 9 11 Thousand/uL      RBC 4 79 Million/uL      Hemoglobin 14 9 g/dL      Hematocrit 43 2 %      MCV 90 fL      MCH 31 1 pg      MCHC 34 5 g/dL      RDW 12 3 %      MPV 8 5 fL      Platelets 587 Thousands/uL      nRBC 0 /100 WBCs      Neutrophils Relative 64 %      Immat GRANS % 0 %      Lymphocytes Relative 26 %      Monocytes Relative 8 %      Eosinophils Relative 2 %      Basophils Relative 0 %      Neutrophils Absolute 5 74 Thousands/µL      Immature Grans Absolute 0 02 Thousand/uL      Lymphocytes Absolute 2 40 Thousands/µL      Monocytes Absolute 0 70 Thousand/µL      Eosinophils Absolute 0 22 Thousand/µL      Basophils Absolute 0 03 Thousands/µL                  XR chest 2 views   Final Result by Soila Goyal MD (03/26 0827)      No acute cardiopulmonary disease                 Workstation performed: [id number]                    Procedures  ECG 12 Lead Documentation Only    Date/Time: 3/26/2023 7:30 AM  Performed by: Rosendo Galeas DO  Authorized by: Rosendo Galeas DO     ECG reviewed by me, the ED Provider: yes    Patient location:  ED  Previous ECG:     Previous ECG:  Compared to current    Comparison ECG info:  12-    Similarity:  No change  Rate:     ECG rate:  94    ECG rate assessment: normal    Rhythm:     Rhythm: sinus rhythm    Ectopy:     Ectopy: none    QRS:     QRS axis:  Indeterminate    QRS intervals:  Normal  Conduction:     Conduction: normal    ST segments:     ST segments:  Normal  T waves:     T waves: non-specific    Comments:      Low voltage QRS complex  ED Course  ED Course as of 03/26/23 0849   Blake Moreira Mar 26, 2023   0845 Patient reassessed and updated of essentially normal work-up including normal chest x-ray  Most likely patient initially had a viral syndrome and upper respiratory infection and now has bronchitis  We will start patient on a short course of prednisone  He did get relief from the DuoNeb breathing treatment so will provide an inhaler to go home with  Advised him to take over-the-counter cough medication  Advised him to return immediately if his breathing worsens or he develops new fever again or has any hemoptysis or significant chest pain  I encouraged him to follow-up with PCP  HEART score:    History 0=Slightly or non-suspicious   ECG 0=Normal   Age 2= > 65 years   Risk Factors 1= 1 or 2 risk factors   Troponin 0= Less than or equal to 12 ng/L   Total 3                          Medical Decision Making  82-year-old male presents to the ED for 10 days of productive cough which was initially associated with fever however fever has resolved  He reports the cough improved but then is coming back and getting worse  He reports chest tightness and pleuritic chest pain only when coughing  He also reports sore throat, runny nose and congestion    Most likely patient has viral URI and bronchitis however given his age, will obtain chest x-ray to rule out pneumonia or fluid overload  Will check basic labs including cardiac enzymes, EKG  Will swab for COVID/FLU/RSV and for strep  Will give DuoNeb, Mucinex, Tylenol for symptom relief in ED  Amount and/or Complexity of Data Reviewed  Labs: ordered  Decision-making details documented in ED Course  Radiology: ordered and independent interpretation performed  Decision-making details documented in ED Course  ECG/medicine tests: ordered and independent interpretation performed  Decision-making details documented in ED Course  Risk  OTC drugs  Prescription drug management  Disposition  Final diagnoses:   Viral URI with cough   Acute bronchitis     Time reflects when diagnosis was documented in both MDM as applicable and the Disposition within this note     Time User Action Codes Description Comment    3/26/2023  8:46 AM Alicia Torres E Add [J06 9] Viral URI with cough     3/26/2023  8:46 AM Alicia Torres E Add [J20 9] Acute bronchitis       ED Disposition     ED Disposition   Discharge    Condition   Stable    Date/Time   Sun Mar 26, 2023  8:46 AM    Comment   Master Acosta discharge to home/self care  Follow-up Information     Follow up With Specialties Details Why Contact Info Additional [address] [facility], [address] [place] [address], Nurse Practitioner Schedule an appointment as soon as possible for a visit   [address] [facility] [id number] [address] [facility] Emergency Medicine Go to  If symptoms worsen 34 [facility] [address] [facility] Emergency Department, [address] [place], [place], [address]          Patient's Medications   Discharge Prescriptions    PREDNISONE 20 MG TABLET    Take 2 tablets (40 mg total) by mouth daily for 4 days Do not start before March 27, 2023         Start Date: 3/27/2023 End Date: 3/31/2023       Order Dose: 40 mg       Quantity: 8 tablet    Refills: 0       No discharge procedures on file      PDMP Review       Value Time User    PDMP Reviewed  Yes 12/27/2022  9:43 AM REMY Jansen          ED Provider  Electronically Signed by           Jule Gilford, DO  03/26/23 9485

## 2023-03-26 NOTE — Clinical Note
Ascencion Love was seen and treated in our emergency department on 3/26/2023  No restrictions            Diagnosis:     Mone Dates  may return to work on return date  He may return on this date: 03/28/2023         If you have any questions or concerns, please don't hesitate to call        Altagracia Pizarro, DO    ______________________________           _______________          _______________  Hospital Representative                              Date                                Time

## 2023-05-10 LAB
ALBUMIN SERPL-MCNC: 4.4 G/DL (ref 3.6–5.1)
ALBUMIN/GLOB SERPL: 1.8 (CALC) (ref 1–2.5)
ALP SERPL-CCNC: 55 U/L (ref 35–144)
ALT SERPL-CCNC: 16 U/L (ref 9–46)
AST SERPL-CCNC: 18 U/L (ref 10–35)
BILIRUB SERPL-MCNC: 0.6 MG/DL (ref 0.2–1.2)
BUN SERPL-MCNC: 14 MG/DL (ref 7–25)
BUN/CREAT SERPL: NORMAL (CALC) (ref 6–22)
CALCIUM SERPL-MCNC: 9.9 MG/DL (ref 8.6–10.3)
CHLORIDE SERPL-SCNC: 104 MMOL/L (ref 98–110)
CHOLEST SERPL-MCNC: 157 MG/DL
CHOLEST/HDLC SERPL: 4.4 (CALC)
CO2 SERPL-SCNC: 28 MMOL/L (ref 20–32)
CREAT SERPL-MCNC: 1.19 MG/DL (ref 0.7–1.28)
GFR/BSA.PRED SERPLBLD CYS-BASED-ARV: 65 ML/MIN/1.73M2
GLOBULIN SER CALC-MCNC: 2.5 G/DL (CALC) (ref 1.9–3.7)
GLUCOSE SERPL-MCNC: 86 MG/DL (ref 65–99)
HBA1C MFR BLD: 6 % OF TOTAL HGB
HDLC SERPL-MCNC: 36 MG/DL
LDLC SERPL CALC-MCNC: 99 MG/DL (CALC)
NONHDLC SERPL-MCNC: 121 MG/DL (CALC)
POTASSIUM SERPL-SCNC: 4.8 MMOL/L (ref 3.5–5.3)
PROT SERPL-MCNC: 6.9 G/DL (ref 6.1–8.1)
PSA SERPL-MCNC: 2.3 NG/ML
SODIUM SERPL-SCNC: 138 MMOL/L (ref 135–146)
T4 FREE SERPL-MCNC: 1.2 NG/DL (ref 0.8–1.8)
TRIGL SERPL-MCNC: 122 MG/DL
TSH SERPL-ACNC: 0.14 MIU/L (ref 0.4–4.5)

## 2023-05-11 ENCOUNTER — TELEPHONE (OUTPATIENT)
Dept: PAIN MEDICINE | Facility: CLINIC | Age: 73
End: 2023-05-11

## 2023-05-11 NOTE — TELEPHONE ENCOUNTER
S/W pt and advised Dr Dilcia York gave us a referral for Dr Sonia Esposito to do his last hip injection  Advised to follow up with Dr Omar Kaplan office for next step

## 2023-05-11 NOTE — TELEPHONE ENCOUNTER
Caller: Rudi CROWLEY    Doctor: Dr William Jacobs    Reason for call: Pt would like to schedule an inj    Call back#: [phone]

## 2023-05-12 DIAGNOSIS — M25.551 RIGHT HIP PAIN: Primary | ICD-10-CM

## 2023-05-12 DIAGNOSIS — R73.03 PREDIABETES: ICD-10-CM

## 2023-05-12 DIAGNOSIS — M16.0 PRIMARY OSTEOARTHRITIS OF BOTH HIPS: ICD-10-CM

## 2023-05-16 DIAGNOSIS — E03.9 ACQUIRED HYPOTHYROIDISM: ICD-10-CM

## 2023-05-16 RX ORDER — LEVOTHYROXINE SODIUM 0.1 MG/1
100 TABLET ORAL
Qty: 90 TABLET | Refills: 0 | Status: SHIPPED | OUTPATIENT
Start: 2023-05-16

## 2023-05-16 NOTE — TELEPHONE ENCOUNTER
Caller: patient     Doctor: Jj Barkley     Reason for call: pt returning procedure schedulers call    Call back#: [phone] (please call at 10:15 am, thx)

## 2023-05-16 NOTE — TELEPHONE ENCOUNTER
S/w pt and scheduled Right hip injection for 5/23/23 9 am  Gave pre procedure instructions and /vaccine policy

## 2023-05-16 NOTE — TELEPHONE ENCOUNTER
Caller:Patient  Doctor: Elvi Martinez    Reason for call: needs to schedule a procedure    Call back#: [phone]      PLEASE CALL PT AT 12:30

## 2023-05-23 ENCOUNTER — HOSPITAL ENCOUNTER (OUTPATIENT)
Dept: RADIOLOGY | Facility: CLINIC | Age: 73
Discharge: HOME | End: 2023-05-23

## 2023-05-23 VITALS
SYSTOLIC BLOOD PRESSURE: 135 MMHG | HEART RATE: 62 BPM | OXYGEN SATURATION: 95 % | DIASTOLIC BLOOD PRESSURE: 84 MMHG | TEMPERATURE: 97 F | RESPIRATION RATE: 20 BRPM

## 2023-05-23 DIAGNOSIS — M25.551 RIGHT HIP PAIN: ICD-10-CM

## 2023-05-23 DIAGNOSIS — M16.0 PRIMARY OSTEOARTHRITIS OF BOTH HIPS: ICD-10-CM

## 2023-05-23 RX ORDER — BUPIVACAINE HCL/PF 2.5 MG/ML
4 VIAL (ML) INJECTION ONCE
Status: COMPLETED | OUTPATIENT
Start: 2023-05-23 | End: 2023-05-23

## 2023-05-23 RX ORDER — METHYLPREDNISOLONE ACETATE 80 MG/ML
80 INJECTION, SUSPENSION INTRA-ARTICULAR; INTRALESIONAL; INTRAMUSCULAR; PARENTERAL; SOFT TISSUE ONCE
Status: COMPLETED | OUTPATIENT
Start: 2023-05-23 | End: 2023-05-23

## 2023-05-23 RX ADMIN — IOHEXOL 1 ML: 300 INJECTION, SOLUTION INTRAVENOUS at 09:31

## 2023-05-23 RX ADMIN — METHYLPREDNISOLONE ACETATE 80 MG: 80 INJECTION, SUSPENSION INTRA-ARTICULAR; INTRALESIONAL; INTRAMUSCULAR; PARENTERAL; SOFT TISSUE at 09:31

## 2023-05-23 RX ADMIN — Medication 4 ML: at 09:31

## 2023-05-23 NOTE — H&P
History of Present Illness: The patient is a 72 y o  male who presents with complaints of right hip pain    Past Medical History:   Diagnosis Date   • Back injury    • Chronic back pain    • Depression 9/16/2016   • Diabetes mellitus ([facility] [address] )    • Pulmonary embolism (HCC)    • SOB (shortness of breath)    • Thyroid disease        Past Surgical History:   Procedure Laterality Date   • KNEE SURGERY     • TONSILLECTOMY           Current Outpatient Medications:   •  ALPRAZolam (XANAX) 2 MG tablet, Take 1 tablet (2 mg total) by mouth in the morning, Disp: 90 tablet, Rfl: 0  •  citalopram (CeleXA) 40 mg tablet, TAKE 1 TABLET BY MOUTH EVERY DAY, Disp: 90 tablet, Rfl: 1  •  Eliquis 5 MG, TAKE 1 TABLET BY MOUTH TWICE A DAY, Disp: 180 tablet, Rfl: 1  •  Flowflex COVID-19 Ag Home Test KIT, Use as directed, Disp: , Rfl:   •  gabapentin (Neurontin) 100 mg capsule, Take 1 capsule (100 mg total) by mouth 2 (two) times a day Take with 300mg for total dose of 400mg BID , Disp: 180 capsule, Rfl: 1  •  gabapentin (NEURONTIN) 300 mg capsule, TAKE 1 CAPSULE BY MOUTH TWICE A DAY, Disp: 60 capsule, Rfl: 2  •  Influenza Virus Vacc Split PF 0 5 ML Dahiana GOLDMAN [id number](PF) 45 mcg (15 mcg x 3)/0 5 mL intramuscular syringe  inject 0 5 milliliter intramuscularly, Disp: , Rfl:   •  levothyroxine 100 mcg tablet, Take 1 tablet (100 mcg total) by mouth daily in the early morning, Disp: 90 tablet, Rfl: 0  •  metFORMIN (GLUCOPHAGE) 500 mg tablet, TAKE 1 TABLET (500 MG TOTAL) BY MOUTH DAILY  , Disp: 90 tablet, Rfl: 0  •  methylPREDNISolone 4 MG tablet therapy pack, Use as directed on package (Patient not taking: Reported on 12/27/2022), Disp: 1 each, Rfl: 0    No Known Allergies    Physical Exam:   Vitals:    05/23/23 0911   BP: 124/80   Pulse:    Resp:    Temp:    SpO2:      General: Awake, Alert, Oriented x 3, Mood and affect appropriate  Respiratory: Respirations even and unlabored  Cardiovascular: Peripheral pulses intact; no edema  Musculoskeletal Exam: pain with right hip flexion    ASA Score: 3    Patient/Chart Verification  Patient ID Verified: Verbal  ID Band Applied: No  Consents Confirmed: To be obtained in the Pre-Procedure area  H&P( within 30 days) Verified: To be obtained in the Pre-Procedure area  Interval H&P(within 24 hr) Complete (required for Outpatients and Surgery Admit only): To be obtained in the Pre-Procedure area  Allergies Reviewed: Yes  Anticoag/NSAID held?: No  Currently on antibiotics?: No  Pregnancy denied?: NA    Assessment:   1  Right hip pain    2   Primary osteoarthritis of both hips        Plan: Right hip CSI per Dr Justo Burt

## 2023-05-23 NOTE — DISCHARGE INSTR - LAB
Steroid Joint Injection   WHAT YOU NEED TO KNOW:   A steroid joint injection is a procedure to inject steroid medicine into a joint  Steroid medicine decreases pain and inflammation  The injection may also contain an anesthetic (numbing medicine) to decrease pain  It may be done to treat conditions such as arthritis, gout, or carpal tunnel syndrome  The injections may be given in your knee, ankle, shoulder, elbow, wrist, ankle or sacroiliac joint  Do not apply heat to any area that is numb  If you have discomfort or soreness at the injection site, you may apply ice today, 20 minutes on and 20 minutes off  Tomorrow you may use ice or warm, moist heat  Do not apply ice or heat directly to the skin  You may have an increase or change in the discomfort for 36-48 hours after your treatment  Apply ice and continue with any pain medicine you have been prescribed  Do not do anything strenuous today  You may shower, but no tub baths or hot tubs today  You may resume your normal activities tomorrow, but do not “overdo it”  Resume normal activities slowly when you are feeling better  If you experience redness, drainage or swelling at the injection site, or if you develop a fever above 100 degrees, please call [facility] at [phone] or go to the Emergency Room  Continue to take all routine medicines prescribed by your primary care physician unless otherwise instructed by our staff  Most blood thinners should be started again according to your regularly scheduled dosing  If you have any questions, please give our office a call  As no general anesthesia was used in today's procedure, you should not experience any side effects related to anesthesia  If you are diabetic, the steroids used in today's injection may temporarily increase your blood sugar levels after the first few days after your injection   Please keep a close eye on your sugars and alert the doctor who manages your diabetes if your sugars are significantly high from your baseline or you are symptomatic  If you have a problem specifically related to your procedure, please call our office at [phone]  Problems not related to your procedure should be directed to your primary care physician

## 2023-05-30 ENCOUNTER — OFFICE VISIT (OUTPATIENT)
Dept: FAMILY MEDICINE CLINIC | Facility: CLINIC | Age: 73
End: 2023-05-30

## 2023-05-30 VITALS
DIASTOLIC BLOOD PRESSURE: 60 MMHG | BODY MASS INDEX: 36.5 KG/M2 | WEIGHT: 284.4 LBS | HEART RATE: 92 BPM | OXYGEN SATURATION: 95 % | SYSTOLIC BLOOD PRESSURE: 90 MMHG | HEIGHT: 74 IN

## 2023-05-30 DIAGNOSIS — R73.03 PREDIABETES: ICD-10-CM

## 2023-05-30 DIAGNOSIS — F33.42 RECURRENT MAJOR DEPRESSIVE DISORDER, IN FULL REMISSION (HCC): ICD-10-CM

## 2023-05-30 DIAGNOSIS — R91.1 LUNG NODULE: ICD-10-CM

## 2023-05-30 DIAGNOSIS — G31.84 MCI (MILD COGNITIVE IMPAIRMENT): ICD-10-CM

## 2023-05-30 DIAGNOSIS — K62.89 NODULE OF ANUS: ICD-10-CM

## 2023-05-30 DIAGNOSIS — I26.99 BILATERAL PULMONARY EMBOLISM (HCC): ICD-10-CM

## 2023-05-30 DIAGNOSIS — F41.9 ANXIETY: Primary | ICD-10-CM

## 2023-05-30 DIAGNOSIS — E03.9 ACQUIRED HYPOTHYROIDISM: ICD-10-CM

## 2023-05-30 DIAGNOSIS — E66.01 CLASS 2 SEVERE OBESITY DUE TO EXCESS CALORIES WITH SERIOUS COMORBIDITY AND BODY MASS INDEX (BMI) OF 37.0 TO 37.9 IN ADULT (HCC): ICD-10-CM

## 2023-05-30 RX ORDER — CITALOPRAM 40 MG/1
40 TABLET ORAL DAILY
Qty: 90 TABLET | Refills: 1 | Status: SHIPPED | OUTPATIENT
Start: 2023-05-30

## 2023-05-30 RX ORDER — ALPRAZOLAM 2 MG/1
2 TABLET ORAL DAILY
Qty: 90 TABLET | Refills: 0 | Status: SHIPPED | OUTPATIENT
Start: 2023-05-30

## 2023-05-30 NOTE — ASSESSMENT & PLAN NOTE
Patient continues to be stable  His last fill was in December for 90 tablets  Contract is up to date

## 2023-05-30 NOTE — ASSESSMENT & PLAN NOTE
Has not seen pulmonary in a year  Smoking risk was 18 py  Quit in 2001  Last ct showed resolved nodule and resolving ground glass

## 2023-05-30 NOTE — PROGRESS NOTES
Name: Ashley Jackson      : 1950      MRN: [id number]  Encounter Provider: REMY Paz  Encounter Date: 2023   Encounter department: [facility] PRIMARY CARE    Assessment & Plan     1  Anxiety  Assessment & Plan:  Patient continues to be stable  His last fill was in December for 90 tablets  Contract is up to date  Orders:  -     ALPRAZolam (XANAX) 2 MG tablet; Take 1 tablet (2 mg total) by mouth in the morning  -     citalopram (CeleXA) 40 mg tablet; Take 1 tablet (40 mg total) by mouth daily    2  Bilateral pulmonary embolism (HCC)  Assessment & Plan:  Presently stable on eliquis     Orders:  -     apixaban (Eliquis) 5 mg; Take 1 tablet (5 mg total) by mouth 2 (two) times a day  -     CBC and differential; Future; Expected date: 2023    3  Class 2 severe obesity due to excess calories with serious comorbidity and body mass index (BMI) of 37 0 to 37 9 in adult ([facility] [place] [address] )    4  Prediabetes  Assessment & Plan:  Patient a1c is slightly up at 6 0 he is on metformin  Continue same dose  Orders:  -     Hemoglobin A1C; Future; Expected date: 2023  -     Comprehensive metabolic panel; Future; Expected date: 2023  -     Lipid panel; Future; Expected date: 2023    5  Lung nodule  Assessment & Plan:  Has not seen pulmonary in a year  Smoking risk was 18 py  Quit in   Last ct showed resolved nodule and resolving ground glass  6  Acquired hypothyroidism  Assessment & Plan:  Patient levothyroxine was reduced to 100mcg  Recheck tsh in 6 weeks  Orders:  -     TSH, 3rd generation with Free T4 reflex; Future; Expected date: 2023  -     Lipid panel; Future; Expected date: 2023    7  Recurrent major depressive disorder, in full remission [facility])  Assessment & Plan:  Patient depression is stable  He is on celexa  Continue same medications  8  MCI (mild cognitive impairment)  Assessment & Plan:  Patient reports that he feels this is stable   Will plan to complete moca at [facility] this winter  9  Nodule of anus  Assessment & Plan:  Refer to colorectal for excision  possible cyst versus nodule versus neoplasm     Orders:  -     Ambulatory Referral to Colorectal Surgery; Future      BMI Counseling: Body mass index is 36 51 kg/m²  The BMI is above normal  Nutrition recommendations include decreasing portion sizes, moderation in carbohydrate intake, reducing intake of saturated and trans fat and reducing intake of cholesterol  Exercise recommendations include moderate physical activity 150 minutes/week and strength training exercises  Rationale for BMI follow-up plan is due to patient being overweight or obese  Subjective      Patient presents today for follow up  He has labs to review    He has been working on weight loss  Because he has plans to go to [facility]  Patient anxiety and depression is very stable  Has back pain that is more consistent and using back brace regularly which is helping  Patient has concerns about a growth at his anal sphincter that has been there for 2 months  Thought it was hemorrhoid  was treated with preparation H it has not changed in size  It did grow to the size of a pea and then stopped  It is not painful and it doesn't bleed  Patient reports his memory has been stable  He doesn't feel that there has been any progression in this can be forgetful of his medications at times but remember this  Doesn't get lost      Review of Systems   Respiratory: Negative for cough, chest tightness and shortness of breath  Cardiovascular: Negative for chest pain, palpitations and leg swelling  Musculoskeletal: Positive for back pain  Psychiatric/Behavioral: Negative for dysphoric mood and sleep disturbance  The patient is not nervous/anxious          Current Outpatient Medications on File Prior to Visit   Medication Sig   • gabapentin (Neurontin) 100 mg capsule Take 1 capsule (100 mg total) by mouth 2 (two) times a day "Take with 300mg for total dose of 400mg BID  • gabapentin (NEURONTIN) 300 mg capsule TAKE 1 CAPSULE BY MOUTH TWICE A DAY   • levothyroxine 100 mcg tablet Take 1 tablet (100 mcg total) by mouth daily in the early morning   • metFORMIN (GLUCOPHAGE) 500 mg tablet TAKE 1 TABLET (500 MG TOTAL) BY MOUTH DAILY  • [DISCONTINUED] ALPRAZolam (XANAX) 2 MG tablet Take 1 tablet (2 mg total) by mouth in the morning   • [DISCONTINUED] citalopram (CeleXA) 40 mg tablet TAKE 1 TABLET BY MOUTH EVERY DAY   • [DISCONTINUED] Eliquis 5 MG TAKE 1 TABLET BY MOUTH TWICE A DAY   • Flowflex COVID-19 Ag Home Test KIT Use as directed (Patient not taking: Reported on 5/30/2023)   • Influenza Virus Vacc Split PF 0 5 ML SUSI Afluria [id number](PF) 45 mcg (15 mcg x 3)/0 5 mL intramuscular syringe   inject 0 5 milliliter intramuscularly   • methylPREDNISolone 4 MG tablet therapy pack Use as directed on package (Patient not taking: Reported on 12/27/2022)       Objective     BP 90/60 (BP Location: Left arm, Patient Position: Sitting, Cuff Size: Standard)   Pulse 92   Ht 6' 2\" (1 88 m)   Wt 129 kg (284 lb 6 4 oz)   SpO2 95%   BMI 36 51 kg/m²     Physical Exam  Vitals and nursing note reviewed  Constitutional:       Appearance: Normal appearance  He is well-developed  He is obese  He is not ill-appearing  HENT:      Head: Normocephalic and atraumatic  Eyes:      Extraocular Movements: Extraocular movements intact  Conjunctiva/sclera: Conjunctivae normal       Pupils: Pupils are equal    Cardiovascular:      Rate and Rhythm: Normal rate and regular rhythm  Heart sounds: S1 normal and S2 normal  No murmur heard  Pulmonary:      Effort: Pulmonary effort is normal  No respiratory distress  Breath sounds: Normal breath sounds  Genitourinary:      Neurological:      Mental Status: He is alert and oriented to person, place, and time     Psychiatric:         Mood and Affect: Mood normal          Behavior: Behavior normal          " Thought Content:  Thought content normal          Judgment: Judgment normal           REMY Khalil

## 2023-08-09 DIAGNOSIS — R73.03 PREDIABETES: ICD-10-CM

## 2023-08-21 DIAGNOSIS — E03.9 ACQUIRED HYPOTHYROIDISM: ICD-10-CM

## 2023-08-21 DIAGNOSIS — M54.32 SCIATICA OF LEFT SIDE: ICD-10-CM

## 2023-08-21 RX ORDER — LEVOTHYROXINE SODIUM 0.1 MG/1
100 TABLET ORAL
Qty: 90 TABLET | Refills: 1 | Status: SHIPPED | OUTPATIENT
Start: 2023-08-21

## 2023-08-21 RX ORDER — GABAPENTIN 300 MG/1
CAPSULE ORAL
Qty: 60 CAPSULE | Refills: 2 | Status: SHIPPED | OUTPATIENT
Start: 2023-08-21

## 2023-11-10 ENCOUNTER — OFFICE VISIT (OUTPATIENT)
Age: 73
End: 2023-11-10
Payer: COMMERCIAL

## 2023-11-10 VITALS
RESPIRATION RATE: 18 BRPM | DIASTOLIC BLOOD PRESSURE: 88 MMHG | TEMPERATURE: 97.1 F | BODY MASS INDEX: 36.62 KG/M2 | SYSTOLIC BLOOD PRESSURE: 114 MMHG | HEART RATE: 83 BPM | WEIGHT: 285.2 LBS | OXYGEN SATURATION: 95 %

## 2023-11-10 DIAGNOSIS — H60.392 OTHER INFECTIVE ACUTE OTITIS EXTERNA OF LEFT EAR: ICD-10-CM

## 2023-11-10 DIAGNOSIS — J01.10 ACUTE NON-RECURRENT FRONTAL SINUSITIS: Primary | ICD-10-CM

## 2023-11-10 PROBLEM — M25.551 PAIN IN JOINT OF RIGHT HIP: Status: ACTIVE | Noted: 2022-11-11

## 2023-11-10 PROBLEM — M54.17 LUMBOSACRAL RADICULITIS: Status: ACTIVE | Noted: 2023-11-10

## 2023-11-10 PROBLEM — M47.817 LUMBOSACRAL SPONDYLOSIS WITHOUT MYELOPATHY: Status: ACTIVE | Noted: 2023-11-10

## 2023-11-10 PROBLEM — M48.061 SPINAL STENOSIS OF LUMBAR REGION: Status: ACTIVE | Noted: 2023-11-10

## 2023-11-10 PROCEDURE — 99213 OFFICE O/P EST LOW 20 MIN: CPT

## 2023-11-10 RX ORDER — AMOXICILLIN AND CLAVULANATE POTASSIUM 875; 125 MG/1; MG/1
1 TABLET, FILM COATED ORAL EVERY 12 HOURS SCHEDULED
Qty: 14 TABLET | Refills: 0 | Status: SHIPPED | OUTPATIENT
Start: 2023-11-10 | End: 2023-11-17

## 2023-11-10 RX ORDER — OFLOXACIN 3 MG/ML
10 SOLUTION AURICULAR (OTIC) DAILY
Qty: 5 ML | Refills: 0 | Status: SHIPPED | OUTPATIENT
Start: 2023-11-10 | End: 2023-11-10 | Stop reason: ALTCHOICE

## 2023-11-10 NOTE — PROGRESS NOTES
[facility]        NAME: Juan Kelly is a 72 y.o. male  : 1950    MRN: [id number]  DATE: November 10, 2023  TIME: 9:19 AM    Assessment and Plan   Acute non-recurrent frontal sinusitis [J01.10]  1. Acute non-recurrent frontal sinusitis  amoxicillin-clavulanate (AUGMENTIN) 875-125 mg per tablet      2. Other infective acute otitis externa of left ear  neomycin-polymyxin-hydrocortisone (CORTISPORIN) otic solution    DISCONTINUED: ofloxacin (FLOXIN) 0.3 % otic solution        Clinical presentation consistent with otitis externa and sinus infection, antibiotics and ear drops as directed. VSS in clinic, appears in no acute distress. Educated on use of OTC products for additional relief of symptoms. Advised close follow-up with PCP or to report to the ER if symptoms worsen. Patient verbalizes understanding and agreeable to plan. Patient Instructions     Apply drops and take antibiotic as directed for next 7 days. Avoid soaking in bath tubs or swimming for next 7 days. May take tylenol/ibuprofen as needed for pain and fever and use flonase with nasal saline for congestion. Complete entire course of antibiotics even if symptoms improve and take medication with food to avoid upset stomach. Follow-up with PCP in 3-5 days if no improvement of symptoms. Report to the ER sooner if symptoms worsen. Chief Complaint     Chief Complaint   Patient presents with   • Earache     Symptoms started about 5 days ago. Patient stated that he is unable  hear from his left ear. C/o of nasal drip that started over a week ago. History of Present Illness       72year old male presents for evaluation of left ear pain that started 5 days ago and sinus pain/pressure for the past week and a half. He denies any known sick contacts or triggers.  He relates he received his RSV vaccine on Wednesday and had "flu-like symptoms (fever/chills)" after that but denies any measurable fevers, cough, shortness of breath, abdominal pain, body ahces, or NVD. He does have a sore throat secondary to post-nasal drip and relates mucous in his nose smells bad. He relates he did have his ears flushed out by his PCP at the end of October but is currently having difficulty hearing out of his left ear. He has not tried any interventions for symptoms. Earache   There is pain in the left ear. This is a recurrent problem. The current episode started in the past 7 days. The problem occurs every few minutes. The problem has been gradually worsening. There has been no fever. The pain is at a severity of 5/10. The pain is mild. Associated symptoms include headaches, hearing loss and rhinorrhea. Pertinent negatives include no abdominal pain, coughing, diarrhea, ear discharge, neck pain, rash, sore throat or vomiting. He has tried nothing for the symptoms. The treatment provided no relief. There is no history of a chronic ear infection, hearing loss or a tympanostomy tube. Review of Systems   Review of Systems   Constitutional:  Positive for chills and fever (subjective). Negative for activity change, appetite change and fatigue. HENT:  Positive for congestion, ear pain, hearing loss, postnasal drip, rhinorrhea, sinus pressure and sinus pain. Negative for ear discharge, mouth sores, nosebleeds, sneezing, sore throat and trouble swallowing. Eyes:  Negative for visual disturbance. Respiratory:  Negative for cough and chest tightness. Cardiovascular:  Negative for chest pain and palpitations. Gastrointestinal:  Negative for abdominal pain, blood in stool, constipation, diarrhea and vomiting. Musculoskeletal:  Negative for arthralgias and neck pain. Skin:  Negative for color change, pallor and rash. Allergic/Immunologic: Negative for environmental allergies and food allergies. Neurological:  Positive for headaches. Negative for dizziness and light-headedness.          Current Medications       Current Outpatient Medications: •  ALPRAZolam (XANAX) 2 MG tablet, Take 1 tablet (2 mg total) by mouth in the morning, Disp: 90 tablet, Rfl: 0  •  amoxicillin-clavulanate (AUGMENTIN) 875-125 mg per tablet, Take 1 tablet by mouth every 12 (twelve) hours for 7 days, Disp: 14 tablet, Rfl: 0  •  apixaban (Eliquis) 5 mg, Take 1 tablet (5 mg total) by mouth 2 (two) times a day, Disp: 180 tablet, Rfl: 1  •  citalopram (CeleXA) 40 mg tablet, Take 1 tablet (40 mg total) by mouth daily, Disp: 90 tablet, Rfl: 1  •  gabapentin (Neurontin) 100 mg capsule, Take 1 capsule (100 mg total) by mouth 2 (two) times a day Take with 300mg for total dose of 400mg BID., Disp: 180 capsule, Rfl: 1  •  gabapentin (NEURONTIN) 300 mg capsule, TAKE 1 CAPSULE BY MOUTH TWICE A DAY, Disp: 60 capsule, Rfl: 2  •  Influenza Virus Vacc Split PF 0.5 ML Dahiana GOLDMAN [id number](PF) 45 mcg (15 mcg x 3)/0.5 mL intramuscular syringe  inject 0.5 milliliter intramuscularly, Disp: , Rfl:   •  levothyroxine 100 mcg tablet, TAKE 1 TABLET (100 MCG TOTAL) BY MOUTH DAILY IN THE EARLY MORNING, Disp: 90 tablet, Rfl: 1  •  metFORMIN (GLUCOPHAGE) 500 mg tablet, TAKE 1 TABLET (500 MG TOTAL) BY MOUTH DAILY. , Disp: 90 tablet, Rfl: 1  •  neomycin-polymyxin-hydrocortisone (CORTISPORIN) otic solution, Administer 4 drops into the left ear every 6 (six) hours for 7 days, Disp: 10 mL, Rfl: 0  •  Flowflex COVID-19 Ag Home Test KIT, Use as directed (Patient not taking: Reported on 5/30/2023), Disp: , Rfl:   •  methylPREDNISolone 4 MG tablet therapy pack, Use as directed on package (Patient not taking: Reported on 12/27/2022), Disp: 1 each, Rfl: 0    Current Allergies     Allergies as of 11/10/2023   • (No Known Allergies)            The following portions of the patient's history were reviewed and updated as appropriate: allergies, current medications, past family history, past medical history, past social history, past surgical history and problem list.     Past Medical History:   Diagnosis Date   • Back injury    • Chronic back pain    • Depression 9/16/2016   • Diabetes mellitus ([address])    • Pulmonary embolism (HCC)    • SOB (shortness of breath)    • Thyroid disease        Past Surgical History:   Procedure Laterality Date   • KNEE SURGERY     • TONSILLECTOMY         Family History   Problem Relation Age of Onset   • Hypothyroidism Mother    • Mental illness Mother         disorder   • Stroke Mother    • Hypothyroidism Sister    • Cancer Sister          Medications have been verified. Objective   /88   Pulse 83   Temp (!) 97.1 °F (36.2 °C)   Resp 18   Wt 129 kg (285 lb 3.2 oz)   SpO2 95%   BMI 36.62 kg/m²        Physical Exam     Physical Exam  Vitals and nursing note reviewed. Constitutional:       General: He is awake. Appearance: Normal appearance. He is overweight. HENT:      Head: Normocephalic and atraumatic. Right Ear: Hearing, tympanic membrane, ear canal and external ear normal. No decreased hearing noted. Left Ear: Decreased hearing noted. Drainage and tenderness present. There is impacted cerumen. Ears:      Comments: Tenderness upon insertion of otoscope into left ear, unable to fully insert d/t pain. Mild erythema and yellow discharge in canal of left ear. Nose: Congestion and rhinorrhea present. Rhinorrhea is purulent. Right Turbinates: Enlarged. Not swollen or pale. Left Turbinates: Enlarged. Not swollen or pale. Right Sinus: Frontal sinus tenderness present. No maxillary sinus tenderness. Left Sinus: Frontal sinus tenderness present. No maxillary sinus tenderness. Mouth/Throat:      Lips: Pink. Mouth: Mucous membranes are moist.      Pharynx: Oropharynx is clear. Uvula midline. Posterior oropharyngeal erythema and uvula swelling present. No pharyngeal swelling or oropharyngeal exudate. Tonsils: No tonsillar exudate or tonsillar abscesses. 2+ on the right. 2+ on the left.    Eyes:      Conjunctiva/sclera: Conjunctivae normal.   Cardiovascular:      Rate and Rhythm: Normal rate and regular rhythm. Pulses: Normal pulses. Heart sounds: Normal heart sounds. Pulmonary:      Effort: Pulmonary effort is normal.      Breath sounds: Normal breath sounds. Musculoskeletal:      Cervical back: Full passive range of motion without pain, normal range of motion and neck supple. Lymphadenopathy:      Head:      Left side of head: Preauricular and posterior auricular adenopathy present. Cervical: No cervical adenopathy. Skin:     General: Skin is warm and dry. Neurological:      General: No focal deficit present. Mental Status: He is alert and oriented to person, place, and time. Psychiatric:         Mood and Affect: Mood normal.         Behavior: Behavior normal. Behavior is cooperative. Thought Content:  Thought content normal.         Judgment: Judgment normal.

## 2023-11-18 ENCOUNTER — PATIENT MESSAGE (OUTPATIENT)
Dept: FAMILY MEDICINE CLINIC | Facility: CLINIC | Age: 73
End: 2023-11-18

## 2023-11-18 DIAGNOSIS — I26.99 BILATERAL PULMONARY EMBOLISM (HCC): ICD-10-CM

## 2023-11-20 DIAGNOSIS — R73.03 PREDIABETES: ICD-10-CM

## 2023-11-20 DIAGNOSIS — M54.42 CHRONIC BILATERAL LOW BACK PAIN WITH BILATERAL SCIATICA: ICD-10-CM

## 2023-11-20 DIAGNOSIS — M54.41 CHRONIC BILATERAL LOW BACK PAIN WITH BILATERAL SCIATICA: ICD-10-CM

## 2023-11-20 DIAGNOSIS — M54.32 SCIATICA OF LEFT SIDE: ICD-10-CM

## 2023-11-20 DIAGNOSIS — F41.9 ANXIETY: ICD-10-CM

## 2023-11-20 DIAGNOSIS — E03.9 ACQUIRED HYPOTHYROIDISM: ICD-10-CM

## 2023-11-20 DIAGNOSIS — G89.29 CHRONIC BILATERAL LOW BACK PAIN WITH BILATERAL SCIATICA: ICD-10-CM

## 2023-11-21 RX ORDER — LEVOTHYROXINE SODIUM 0.1 MG/1
100 TABLET ORAL
Qty: 90 TABLET | Refills: 1 | Status: SHIPPED | OUTPATIENT
Start: 2023-11-21

## 2023-11-21 RX ORDER — CITALOPRAM 40 MG/1
40 TABLET ORAL DAILY
Qty: 90 TABLET | Refills: 1 | Status: SHIPPED | OUTPATIENT
Start: 2023-11-21

## 2023-11-21 RX ORDER — GABAPENTIN 300 MG/1
300 CAPSULE ORAL 2 TIMES DAILY
Qty: 180 CAPSULE | Refills: 1 | Status: SHIPPED | OUTPATIENT
Start: 2023-11-21

## 2023-11-21 RX ORDER — GABAPENTIN 100 MG/1
100 CAPSULE ORAL 2 TIMES DAILY
Qty: 180 CAPSULE | Refills: 1 | Status: SHIPPED | OUTPATIENT
Start: 2023-11-21

## 2023-11-22 LAB
ALBUMIN SERPL-MCNC: 4.5 G/DL (ref 3.6–5.1)
ALBUMIN/GLOB SERPL: 1.6 (CALC) (ref 1–2.5)
ALP SERPL-CCNC: 58 U/L (ref 35–144)
ALT SERPL-CCNC: 19 U/L (ref 9–46)
AST SERPL-CCNC: 20 U/L (ref 10–35)
BASOPHILS # BLD AUTO: 29 CELLS/UL (ref 0–200)
BASOPHILS NFR BLD AUTO: 0.5 %
BILIRUB SERPL-MCNC: 0.6 MG/DL (ref 0.2–1.2)
BUN SERPL-MCNC: 16 MG/DL (ref 7–25)
BUN/CREAT SERPL: NORMAL (CALC) (ref 6–22)
CALCIUM SERPL-MCNC: 10 MG/DL (ref 8.6–10.3)
CHLORIDE SERPL-SCNC: 104 MMOL/L (ref 98–110)
CHOLEST SERPL-MCNC: 180 MG/DL
CHOLEST/HDLC SERPL: 4.3 (CALC)
CO2 SERPL-SCNC: 26 MMOL/L (ref 20–32)
CREAT SERPL-MCNC: 1.23 MG/DL (ref 0.7–1.28)
EOSINOPHIL # BLD AUTO: 171 CELLS/UL (ref 15–500)
EOSINOPHIL NFR BLD AUTO: 3 %
ERYTHROCYTE [DISTWIDTH] IN BLOOD BY AUTOMATED COUNT: 12.2 % (ref 11–15)
GFR/BSA.PRED SERPLBLD CYS-BASED-ARV: 63 ML/MIN/1.73M2
GLOBULIN SER CALC-MCNC: 2.9 G/DL (CALC) (ref 1.9–3.7)
GLUCOSE SERPL-MCNC: 94 MG/DL (ref 65–99)
HBA1C MFR BLD: 5.9 % OF TOTAL HGB
HCT VFR BLD AUTO: 44.6 % (ref 38.5–50)
HDLC SERPL-MCNC: 42 MG/DL
HGB BLD-MCNC: 15.3 G/DL (ref 13.2–17.1)
LDLC SERPL CALC-MCNC: 117 MG/DL (CALC)
LYMPHOCYTES # BLD AUTO: 2913 CELLS/UL (ref 850–3900)
LYMPHOCYTES NFR BLD AUTO: 51.1 %
MCH RBC QN AUTO: 31.5 PG (ref 27–33)
MCHC RBC AUTO-ENTMCNC: 34.3 G/DL (ref 32–36)
MCV RBC AUTO: 91.8 FL (ref 80–100)
MONOCYTES # BLD AUTO: 513 CELLS/UL (ref 200–950)
MONOCYTES NFR BLD AUTO: 9 %
NEUTROPHILS # BLD AUTO: 2075 CELLS/UL (ref 1500–7800)
NEUTROPHILS NFR BLD AUTO: 36.4 %
NONHDLC SERPL-MCNC: 138 MG/DL (CALC)
PLATELET # BLD AUTO: 260 THOUSAND/UL (ref 140–400)
PMV BLD REES-ECKER: 9 FL (ref 7.5–12.5)
POTASSIUM SERPL-SCNC: 4.6 MMOL/L (ref 3.5–5.3)
PROT SERPL-MCNC: 7.4 G/DL (ref 6.1–8.1)
RBC # BLD AUTO: 4.86 MILLION/UL (ref 4.2–5.8)
SODIUM SERPL-SCNC: 137 MMOL/L (ref 135–146)
TRIGL SERPL-MCNC: 99 MG/DL
TSH SERPL-ACNC: 3.9 MIU/L (ref 0.4–4.5)
WBC # BLD AUTO: 5.7 THOUSAND/UL (ref 3.8–10.8)

## 2023-12-12 ENCOUNTER — TELEPHONE (OUTPATIENT)
Dept: OBGYN CLINIC | Facility: HOSPITAL | Age: 73
End: 2023-12-12

## 2023-12-12 DIAGNOSIS — M25.551 RIGHT HIP PAIN: Primary | ICD-10-CM

## 2023-12-12 DIAGNOSIS — M16.0 PRIMARY OSTEOARTHRITIS OF BOTH HIPS: ICD-10-CM

## 2023-12-12 NOTE — TELEPHONE ENCOUNTER
Caller: Rudi     Doctor: Ilsa Jones     Reason for call: Patient calling in to schedule his next US GI hip injection. Last injection 5/23 .         Call back#: [phone]

## 2023-12-27 ENCOUNTER — RA CDI HCC (OUTPATIENT)
Dept: OTHER | Facility: HOSPITAL | Age: 73
End: 2023-12-27

## 2023-12-27 NOTE — PROGRESS NOTES
HCC coding opportunities       Chart reviewed, no opportunity found: CHART REVIEWED, NO OPPORTUNITY FOUND        Patients Insurance     Medicare Insurance: [employer]

## 2023-12-29 ENCOUNTER — OFFICE VISIT (OUTPATIENT)
Dept: FAMILY MEDICINE CLINIC | Facility: CLINIC | Age: 73
End: 2023-12-29
Payer: COMMERCIAL

## 2023-12-29 VITALS
OXYGEN SATURATION: 95 % | HEART RATE: 82 BPM | WEIGHT: 289 LBS | HEIGHT: 74 IN | BODY MASS INDEX: 37.09 KG/M2 | DIASTOLIC BLOOD PRESSURE: 70 MMHG | SYSTOLIC BLOOD PRESSURE: 122 MMHG

## 2023-12-29 DIAGNOSIS — F33.42 RECURRENT MAJOR DEPRESSIVE DISORDER, IN FULL REMISSION (HCC): ICD-10-CM

## 2023-12-29 DIAGNOSIS — Z23 NEED FOR COVID-19 VACCINE: ICD-10-CM

## 2023-12-29 DIAGNOSIS — M54.32 SCIATICA OF LEFT SIDE: ICD-10-CM

## 2023-12-29 DIAGNOSIS — Z79.899 OTHER LONG TERM (CURRENT) DRUG THERAPY: ICD-10-CM

## 2023-12-29 DIAGNOSIS — G31.84 MCI (MILD COGNITIVE IMPAIRMENT): ICD-10-CM

## 2023-12-29 DIAGNOSIS — G89.29 CHRONIC BILATERAL LOW BACK PAIN WITH BILATERAL SCIATICA: ICD-10-CM

## 2023-12-29 DIAGNOSIS — F41.9 ANXIETY: ICD-10-CM

## 2023-12-29 DIAGNOSIS — M23.92 INTERNAL DERANGEMENT OF LEFT KNEE: ICD-10-CM

## 2023-12-29 DIAGNOSIS — M54.42 CHRONIC BILATERAL LOW BACK PAIN WITH BILATERAL SCIATICA: ICD-10-CM

## 2023-12-29 DIAGNOSIS — R73.03 PREDIABETES: ICD-10-CM

## 2023-12-29 DIAGNOSIS — E03.9 ACQUIRED HYPOTHYROIDISM: Primary | ICD-10-CM

## 2023-12-29 DIAGNOSIS — Z00.00 MEDICARE ANNUAL WELLNESS VISIT, SUBSEQUENT: ICD-10-CM

## 2023-12-29 DIAGNOSIS — R91.1 LUNG NODULE: ICD-10-CM

## 2023-12-29 DIAGNOSIS — I26.99 BILATERAL PULMONARY EMBOLISM (HCC): ICD-10-CM

## 2023-12-29 DIAGNOSIS — M54.41 CHRONIC BILATERAL LOW BACK PAIN WITH BILATERAL SCIATICA: ICD-10-CM

## 2023-12-29 DIAGNOSIS — Z12.5 SCREENING FOR PROSTATE CANCER: ICD-10-CM

## 2023-12-29 PROBLEM — R03.0 ELEVATED BLOOD PRESSURE READING: Status: RESOLVED | Noted: 2020-07-16 | Resolved: 2023-12-29

## 2023-12-29 PROBLEM — H61.23 BILATERAL IMPACTED CERUMEN: Status: RESOLVED | Noted: 2020-09-16 | Resolved: 2023-12-29

## 2023-12-29 PROCEDURE — 80366 DRUG SCREENING PREGABALIN: CPT | Performed by: NURSE PRACTITIONER

## 2023-12-29 PROCEDURE — 80346 BENZODIAZEPINES1-12: CPT | Performed by: NURSE PRACTITIONER

## 2023-12-29 PROCEDURE — G0439 PPPS, SUBSEQ VISIT: HCPCS | Performed by: NURSE PRACTITIONER

## 2023-12-29 PROCEDURE — 90480 ADMN SARSCOV2 VAC 1/ONLY CMP: CPT

## 2023-12-29 PROCEDURE — G0480 DRUG TEST DEF 1-7 CLASSES: HCPCS | Performed by: NURSE PRACTITIONER

## 2023-12-29 PROCEDURE — 80355 GABAPENTIN NON-BLOOD: CPT | Performed by: NURSE PRACTITIONER

## 2023-12-29 PROCEDURE — 91320 SARSCV2 VAC 30MCG TRS-SUC IM: CPT

## 2023-12-29 PROCEDURE — 99215 OFFICE O/P EST HI 40 MIN: CPT | Performed by: NURSE PRACTITIONER

## 2023-12-29 PROCEDURE — 80307 DRUG TEST PRSMV CHEM ANLYZR: CPT | Performed by: NURSE PRACTITIONER

## 2023-12-29 RX ORDER — GABAPENTIN 300 MG/1
300 CAPSULE ORAL 2 TIMES DAILY
Qty: 180 CAPSULE | Refills: 1 | Status: SHIPPED | OUTPATIENT
Start: 2023-12-29

## 2023-12-29 RX ORDER — METHYLPREDNISOLONE 4 MG/1
TABLET ORAL
Qty: 21 EACH | Refills: 0 | Status: SHIPPED | OUTPATIENT
Start: 2023-12-29

## 2023-12-29 RX ORDER — LEVOTHYROXINE SODIUM 0.1 MG/1
100 TABLET ORAL
Qty: 90 TABLET | Refills: 1 | Status: SHIPPED | OUTPATIENT
Start: 2023-12-29

## 2023-12-29 RX ORDER — ALPRAZOLAM 2 MG/1
2 TABLET ORAL DAILY
Qty: 90 TABLET | Refills: 0 | Status: SHIPPED | OUTPATIENT
Start: 2023-12-29

## 2023-12-29 RX ORDER — CITALOPRAM 40 MG/1
40 TABLET ORAL DAILY
Qty: 90 TABLET | Refills: 1 | Status: SHIPPED | OUTPATIENT
Start: 2023-12-29

## 2023-12-29 RX ORDER — GABAPENTIN 100 MG/1
100 CAPSULE ORAL 2 TIMES DAILY
Qty: 180 CAPSULE | Refills: 1 | Status: SHIPPED | OUTPATIENT
Start: 2023-12-29
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
## 2023-12-29 NOTE — PATIENT INSTRUCTIONS
Medicare Preventive Visit Patient Instructions  Thank you for completing your Welcome to Medicare Visit or Medicare Annual Wellness Visit today. Your next wellness visit will be due in one year (12/29/2024).  The screening/preventive services that you may require over the next 5-10 years are detailed below. Some tests may not apply to you based off risk factors and/or age. Screening tests ordered at today's visit but not completed yet may show as past due. Also, please note that scanned in results may not display below.  Preventive Screenings:  Service Recommendations Previous Testing/Comments   Colorectal Cancer Screening  Colonoscopy    Fecal Occult Blood Test (FOBT)/Fecal Immunochemical Test (FIT)  Fecal DNA/Cologuard Test  Flexible Sigmoidoscopy Age: 45-75 years old   Colonoscopy: every 10 years (May be performed more frequently if at higher risk)  OR  FOBT/FIT: every 1 year  OR  Cologuard: every 3 years  OR  Sigmoidoscopy: every 5 years  Screening may be recommended earlier than age 45 if at higher risk for colorectal cancer. Also, an individualized decision between you and your healthcare provider will decide whether screening between the ages of 76-85 would be appropriate. Colonoscopy: 07/22/2021  FOBT/FIT: Not on file  Cologuard: 04/28/2021  Sigmoidoscopy: Not on file    Screening Current     Prostate Cancer Screening Individualized decision between patient and health care provider in men between ages of 55-69   Medicare will cover every 12 months beginning on the day after your 50th birthday PSA: 2.30 ng/mL     Screening Current     Hepatitis C Screening Once for adults born between 1945 and 1965  More frequently in patients at high risk for Hepatitis C Hep C Antibody: Not on file        Diabetes Screening 1-2 times per year if you're at risk for diabetes or have pre-diabetes Fasting glucose: 117 mg/dL (12/27/2019)  A1C: 5.9 % of total Hgb (11/21/2023)  Screening Current   Cholesterol Screening Once every 5  years if you don't have a lipid disorder. May order more often based on risk factors. Lipid panel: 11/21/2023  Screening Current      Other Preventive Screenings Covered by Medicare:  Abdominal Aortic Aneurysm (AAA) Screening: covered once if your at risk. You're considered to be at risk if you have a family history of AAA or a male between the age of 65-75 who smoking at least 100 cigarettes in your lifetime.  Lung Cancer Screening: covers low dose CT scan once per year if you meet all of the following conditions: (1) Age 55-77; (2) No signs or symptoms of lung cancer; (3) Current smoker or have quit smoking within the last 15 years; (4) You have a tobacco smoking history of at least 20 pack years (packs per day x number of years you smoked); (5) You get a written order from a healthcare provider.  Glaucoma Screening: covered annually if you're considered high risk: (1) You have diabetes OR (2) Family history of glaucoma OR (3)  aged 50 and older OR (4)  American aged 65 and older  Osteoporosis Screening: covered every 2 years if you meet one of the following conditions: (1) Have a vertebral abnormality; (2) On glucocorticoid therapy for more than 3 months; (3) Have primary hyperparathyroidism; (4) On osteoporosis medications and need to assess response to drug therapy.  HIV Screening: covered annually if you're between the age of 15-65. Also covered annually if you are younger than 15 and older than 65 with risk factors for HIV infection. For pregnant patients, it is covered up to 3 times per pregnancy.    Immunizations:  Immunization Recommendations   Influenza Vaccine Annual influenza vaccination during flu season is recommended for all persons aged >= 6 months who do not have contraindications   Pneumococcal Vaccine   * Pneumococcal conjugate vaccine = PCV13 (Prevnar 13), PCV15 (Vaxneuvance), PCV20 (Prevnar 20)  * Pneumococcal polysaccharide vaccine = PPSV23 (Pneumovax) Adults 19-65 yo  with certain risk factors or if 65+ yo  If never received any pneumonia vaccine: recommend Prevnar 20 (PCV20)  Give PCV20 if previously received 1 dose of PCV13 or PPSV23   Hepatitis B Vaccine 3 dose series if at intermediate or high risk (ex: diabetes, end stage renal disease, liver disease)   Respiratory syncytial virus (RSV) Vaccine - COVERED BY MEDICARE PART D  * RSVPreF3 (Arexvy) CDC recommends that adults 60 years of age and older may receive a single dose of RSV vaccine using shared clinical decision-making (SCDM)   Tetanus (Td) Vaccine - COST NOT COVERED BY MEDICARE PART B Following completion of primary series, a booster dose should be given every 10 years to maintain immunity against tetanus. Td may also be given as tetanus wound prophylaxis.   Tdap Vaccine - COST NOT COVERED BY MEDICARE PART B Recommended at least once for all adults. For pregnant patients, recommended with each pregnancy.   Shingles Vaccine (Shingrix) - COST NOT COVERED BY MEDICARE PART B  2 shot series recommended in those 19 years and older who have or will have weakened immune systems or those 50 years and older     Health Maintenance Due:      Topic Date Due    Hepatitis C Screening  Never done    Colorectal Cancer Screening  07/21/2026     Immunizations Due:      Topic Date Due    Influenza Vaccine (1) 09/01/2023    COVID-19 Vaccine (3 - 2023-24 season) 09/01/2023     Advance Directives   What are advance directives?  Advance directives are legal documents that state your wishes and plans for medical care. These plans are made ahead of time in case you lose your ability to make decisions for yourself. Advance directives can apply to any medical decision, such as the treatments you want, and if you want to donate organs.   What are the types of advance directives?  There are many types of advance directives, and each state has rules about how to use them. You may choose a combination of any of the following:  Living will:  This is a  written record of the treatment you want. You can also choose which treatments you do not want, which to limit, and which to stop at a certain time. This includes surgery, medicine, IV fluid, and tube feedings.   Durable power of  for healthcare (DPAHC):  This is a written record that states who you want to make healthcare choices for you when you are unable to make them for yourself. This person, called a proxy, is usually a family member or a friend. You may choose more than 1 proxy.  Do not resuscitate (DNR) order:  A DNR order is used in case your heart stops beating or you stop breathing. It is a request not to have certain forms of treatment, such as CPR. A DNR order may be included in other types of advance directives.  Medical directive:  This covers the care that you want if you are in a coma, near death, or unable to make decisions for yourself. You can list the treatments you want for each condition. Treatment may include pain medicine, surgery, blood transfusions, dialysis, IV or tube feedings, and a ventilator (breathing machine).  Values history:  This document has questions about your views, beliefs, and how you feel and think about life. This information can help others choose the care that you would choose.  Why are advance directives important?  An advance directive helps you control your care. Although spoken wishes may be used, it is better to have your wishes written down. Spoken wishes can be misunderstood, or not followed. Treatments may be given even if you do not want them. An advance directive may make it easier for your family to make difficult choices about your care.   Weight Management   Why it is important to manage your weight:  Being overweight increases your risk of health conditions such as heart disease, high blood pressure, type 2 diabetes, and certain types of cancer. It can also increase your risk for osteoarthritis, sleep apnea, and other respiratory problems. Aim for  a slow, steady weight loss. Even a small amount of weight loss can lower your risk of health problems.  How to lose weight safely:  A safe and healthy way to lose weight is to eat fewer calories and get regular exercise. You can lose up about 1 pound a week by decreasing the number of calories you eat by 500 calories each day.   Healthy meal plan for weight management:  A healthy meal plan includes a variety of foods, contains fewer calories, and helps you stay healthy. A healthy meal plan includes the following:  Eat whole-grain foods more often.  A healthy meal plan should contain fiber. Fiber is the part of grains, fruits, and vegetables that is not broken down by your body. Whole-grain foods are healthy and provide extra fiber in your diet. Some examples of whole-grain foods are whole-wheat breads and pastas, oatmeal, brown rice, and bulgur.  Eat a variety of vegetables every day.  Include dark, leafy greens such as spinach, kale, joseph greens, and mustard greens. Eat yellow and orange vegetables such as carrots, sweet potatoes, and winter squash.   Eat a variety of fruits every day.  Choose fresh or canned fruit (canned in its own juice or light syrup) instead of juice. Fruit juice has very little or no fiber.  Eat low-fat dairy foods.  Drink fat-free (skim) milk or 1% milk. Eat fat-free yogurt and low-fat cottage cheese. Try low-fat cheeses such as mozzarella and other reduced-fat cheeses.  Choose meat and other protein foods that are low in fat.  Choose beans or other legumes such as split peas or lentils. Choose fish, skinless poultry (chicken or turkey), or lean cuts of red meat (beef or pork). Before you cook meat or poultry, cut off any visible fat.   Use less fat and oil.  Try baking foods instead of frying them. Add less fat, such as margarine, sour cream, regular salad dressing and mayonnaise to foods. Eat fewer high-fat foods. Some examples of high-fat foods include french fries, doughnuts, ice  cream, and cakes.  Eat fewer sweets.  Limit foods and drinks that are high in sugar. This includes candy, cookies, regular soda, and sweetened drinks.  Exercise:  Exercise at least 30 minutes per day on most days of the week. Some examples of exercise include walking, biking, dancing, and swimming. You can also fit in more physical activity by taking the stairs instead of the elevator or parking farther away from stores. Ask your healthcare provider about the best exercise plan for you.    © Copyright [employer] 2018 Information is for End User's use only and may not be sold, redistributed or otherwise used for commercial purposes. All illustrations and images included in CareNotes® are the copyrighted property of [employer] Inc. or [employer]

## 2023-12-29 NOTE — ASSESSMENT & PLAN NOTE
Presently stable. On celexa and uses xanax 2mg as needed   Contract signed and UDS collected today   Pdmp reviewed no red flags

## 2023-12-29 NOTE — ASSESSMENT & PLAN NOTE
He sees pain management has gotten injections before. Last one was in may 2023.   He having worsening symptoms the last several months  He is on gabapentin  Would consider medrol dose fer.   Call pain management as well

## 2023-12-29 NOTE — ASSESSMENT & PLAN NOTE
Patient has abnormal knee exam today. Will get patient MRI ordered.   Recommend using brace  Will give medrol fer for pain

## 2024-01-02 LAB
1OH-MIDAZOLAM UR CFM-MCNC: NOT DETECTED NG/MG CREAT
6MAM UR QL SCN: NEGATIVE NG/ML
7AMINOCLONAZEPAM UR CFM-MCNC: NOT DETECTED NG/MG CREAT
A-OH ALPRAZ UR QL CFM: 247 NG/MG CREAT
ACCEPTABLE CREAT UR QL: 165 MG/DL
ALPRAZ/CREAT UR CFM: 126 NG/MG CREAT
AMPHET UR QL SCN: NEGATIVE NG/ML
BARBITURATES UR QL SCN: NEGATIVE NG/ML
BENZODIAZ UR QL SCN: ABNORMAL NG/ML
BUPRENORPHINE UR QL CFM: NEGATIVE NG/ML
CANNABINOIDS UR QL SCN: NEGATIVE NG/ML
CARISOPRODOL UR QL: NEGATIVE NG/ML
CLONAZEPAM/CREAT UR CFM: NOT DETECTED NG/MG CREAT
COCAINE+BZE UR QL SCN: NEGATIVE NG/ML
DIAZEPAM/CREAT UR: NOT DETECTED NG/MG CREAT
ETHYL GLUCURONIDE UR QL SCN: NEGATIVE NG/ML
FENTANYL UR QL SCN: NEGATIVE NG/ML
FLUNITRAZEPAM UR-MCNC: NOT DETECTED NG/MG CREAT
GABAPENTIN SERPLBLD QL SCN: ABNORMAL UG/ML
GABAPENTIN UR QL CFM: PRESENT
LORAZEPAM UR CFM-MCNC: NOT DETECTED NG/MG CREAT
METHADONE UR QL SCN: NEGATIVE NG/ML
MIDAZOLAM/CREAT UR CFM: NOT DETECTED NG/MG CREAT
NITRITE UR QL STRIP: NEGATIVE UG/ML
NORDIAZEPAM UR CFM-MCNC: NOT DETECTED NG/MG CREAT
NORFLUNITRAZEPAM UR-MCNC: NOT DETECTED NG/MG CREAT
OH-ETHYLFLURAZ UR CFM-MCNC: NOT DETECTED NG/MG CREAT
OH-TRIAZOLAM UR CFM-MCNC: NOT DETECTED NG/MG CREAT
OPIATES UR QL SCN: NEGATIVE NG/ML
OXAZEPAM CTO UR CFM-MCNC: NOT DETECTED NG/MG CREAT
OXYCODONE+OXYMORPHONE UR QL SCN: NEGATIVE NG/ML
PCP UR QL SCN: NEGATIVE NG/ML
PREGABALIN UR QL CFM: NOT DETECTED
PROPOXYPH UR QL SCN: NEGATIVE NG/ML
SPECIMEN PH ACCEPTABLE UR: 7.2 (ref 4.5–8.9)
TAPENTADOL UR QL SCN: NEGATIVE NG/ML
TEMAZEPAM UR CFM-MCNC: NOT DETECTED NG/MG CREAT
TRAMADOL UR QL SCN: NEGATIVE NG/ML

## 2024-01-03 ENCOUNTER — HOSPITAL ENCOUNTER (OUTPATIENT)
Dept: RADIOLOGY | Facility: HOSPITAL | Age: 74
Discharge: HOME | End: 2024-01-03
Payer: COMMERCIAL

## 2024-01-03 DIAGNOSIS — M23.92 INTERNAL DERANGEMENT OF LEFT KNEE: ICD-10-CM

## 2024-01-03 PROCEDURE — 73564 X-RAY EXAM KNEE 4 OR MORE: CPT

## 2024-01-05 ENCOUNTER — PATIENT MESSAGE (OUTPATIENT)
Dept: FAMILY MEDICINE CLINIC | Facility: CLINIC | Age: 74
End: 2024-01-05

## 2024-01-05 DIAGNOSIS — M54.41 CHRONIC BILATERAL LOW BACK PAIN WITH BILATERAL SCIATICA: ICD-10-CM

## 2024-01-05 DIAGNOSIS — M54.42 CHRONIC BILATERAL LOW BACK PAIN WITH BILATERAL SCIATICA: ICD-10-CM

## 2024-01-05 DIAGNOSIS — G89.29 CHRONIC BILATERAL LOW BACK PAIN WITH BILATERAL SCIATICA: ICD-10-CM
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
## 2024-01-09 RX ORDER — METHYLPREDNISOLONE 4 MG/1
TABLET ORAL
Qty: 21 EACH | Refills: 0 | Status: SHIPPED | OUTPATIENT
Start: 2024-01-09

## 2024-01-14 ENCOUNTER — HOSPITAL ENCOUNTER (OUTPATIENT)
Dept: MRI IMAGING | Facility: HOSPITAL | Age: 74
Discharge: HOME | End: 2024-01-14
Payer: COMMERCIAL

## 2024-01-14 DIAGNOSIS — M23.92 INTERNAL DERANGEMENT OF LEFT KNEE: ICD-10-CM

## 2024-01-14 PROCEDURE — A9585 GADOBUTROL INJECTION: HCPCS | Performed by: NURSE PRACTITIONER

## 2024-01-14 PROCEDURE — 73723 MRI JOINT LWR EXTR W/O&W/DYE: CPT

## 2024-01-14 PROCEDURE — G1004 CDSM NDSC: HCPCS

## 2024-01-14 RX ORDER — GADOBUTROL 604.72 MG/ML
13 INJECTION INTRAVENOUS
Status: COMPLETED | OUTPATIENT
Start: 2024-01-14 | End: 2024-01-14

## 2024-01-14 RX ADMIN — GADOBUTROL 13 ML: 604.72 INJECTION INTRAVENOUS at 12:02

## 2024-01-19 DIAGNOSIS — M23.92 INTERNAL DERANGEMENT OF LEFT KNEE: Primary | ICD-10-CM

## 2024-01-19 DIAGNOSIS — S83.242D ACUTE MEDIAL MENISCUS TEAR OF LEFT KNEE, SUBSEQUENT ENCOUNTER: ICD-10-CM

## 2024-02-21 PROBLEM — Z12.5 SCREENING FOR PROSTATE CANCER: Status: RESOLVED | Noted: 2023-12-29 | Resolved: 2024-02-21

## 2024-02-21 PROBLEM — Z00.00 MEDICARE ANNUAL WELLNESS VISIT, SUBSEQUENT: Status: RESOLVED | Noted: 2020-09-16 | Resolved: 2024-02-21

## 2024-03-15 ENCOUNTER — OFFICE VISIT (OUTPATIENT)
Dept: OBGYN CLINIC | Facility: CLINIC | Age: 74
End: 2024-03-15
Payer: COMMERCIAL

## 2024-03-15 VITALS
WEIGHT: 292 LBS | DIASTOLIC BLOOD PRESSURE: 77 MMHG | HEART RATE: 73 BPM | BODY MASS INDEX: 37.47 KG/M2 | SYSTOLIC BLOOD PRESSURE: 111 MMHG | HEIGHT: 74 IN

## 2024-03-15 DIAGNOSIS — S83.242A TEAR OF MEDIAL MENISCUS OF LEFT KNEE, UNSPECIFIED TEAR TYPE, UNSPECIFIED WHETHER OLD OR CURRENT TEAR, INITIAL ENCOUNTER: ICD-10-CM

## 2024-03-15 DIAGNOSIS — G89.29 CHRONIC PAIN OF LEFT KNEE: ICD-10-CM

## 2024-03-15 DIAGNOSIS — M17.12 PRIMARY OSTEOARTHRITIS OF LEFT KNEE: Primary | ICD-10-CM

## 2024-03-15 DIAGNOSIS — M25.562 CHRONIC PAIN OF LEFT KNEE: ICD-10-CM

## 2024-03-15 PROCEDURE — 20610 DRAIN/INJ JOINT/BURSA W/O US: CPT | Performed by: ORTHOPAEDIC SURGERY

## 2024-03-15 PROCEDURE — 99214 OFFICE O/P EST MOD 30 MIN: CPT | Performed by: ORTHOPAEDIC SURGERY

## 2024-03-15 RX ORDER — LIDOCAINE HYDROCHLORIDE 10 MG/ML
2 INJECTION, SOLUTION INFILTRATION; PERINEURAL
Status: COMPLETED | OUTPATIENT
Start: 2024-03-15 | End: 2024-03-15

## 2024-03-15 RX ORDER — BUPIVACAINE HYDROCHLORIDE 2.5 MG/ML
2 INJECTION, SOLUTION INFILTRATION; PERINEURAL
Status: COMPLETED | OUTPATIENT
Start: 2024-03-15 | End: 2024-03-15

## 2024-03-15 RX ORDER — METHYLPREDNISOLONE ACETATE 40 MG/ML
2 INJECTION, SUSPENSION INTRA-ARTICULAR; INTRALESIONAL; INTRAMUSCULAR; SOFT TISSUE
Status: COMPLETED | OUTPATIENT
Start: 2024-03-15 | End: 2024-03-15

## 2024-03-15 RX ADMIN — BUPIVACAINE HYDROCHLORIDE 2 ML: 2.5 INJECTION, SOLUTION INFILTRATION; PERINEURAL at 09:00

## 2024-03-15 RX ADMIN — METHYLPREDNISOLONE ACETATE 2 ML: 40 INJECTION, SUSPENSION INTRA-ARTICULAR; INTRALESIONAL; INTRAMUSCULAR; SOFT TISSUE at 09:00

## 2024-03-15 RX ADMIN — LIDOCAINE HYDROCHLORIDE 2 ML: 10 INJECTION, SOLUTION INFILTRATION; PERINEURAL at 09:00

## 2024-03-15 NOTE — PROGRESS NOTES
Patient Name:  Zelalem Pepe  MRN:  [id number]    Assessment & Plan     1. Primary osteoarthritis of left knee  -     Ambulatory Referral to Physical Therapy; Future  -     Large joint arthrocentesis: L knee    2. Chronic pain of left knee  -     Ambulatory Referral to Physical Therapy; Future  -     Large joint arthrocentesis: L knee    3. Tear of medial meniscus of left knee, unspecified tear type, unspecified whether old or current tear, initial encounter  -     Large joint arthrocentesis: L knee        74 y.o. male with Left knee osteoarthritis and degenerative meniscus tearing with a history of medial meniscus surgery in the 1980's, degenerative meniscus tear.   X-rays reviewed in office today with patient. Mild medial compartmental narrowing with osteophyte formation, although the X-rays are not weightbearing.   MRI results were reviewed, degenerative mensicus tearing and remanence of prior medial meniscus surgery.   Treatment options were discussed in the form of PT, OTC analgesics and a possible left knee CSI.   Risks and benefits of corticosteroid injection were discussed in detail. Risks including:  Post injection pain and discomfort, elevation in blood sugar, skin discoloration, fatty atrophy, and infection were discussed in detail.  The patient understood and elected to proceed forward.  After sterile preparation the left knee was injected with 2 cc of 1% lidocaine, 2 cc of 0.25% bupivacaine, and 2 cc of Depo-Medrol.  The patient tolerated the procedure and no immediate complications were noted.  The patient was instructed to ice and elevate the injection site, limit strenuous activity for the next 24-48 hours, and contact us if there were any questions or concerns prior to their follow-up appointment.  They were also instructed to monitor blood glucose, checking about 3 times daily for the next week, and contact their primary care physician for management of medications if indicated.   I have  "discussed it to prescribe physical therapy in an effort to decrease pain, improve strength, and improve flexibility, script was provided.  Follow up in 3-4 months time for clinical re-evaluation.     Chief Complaint     Left knee pain    History of the Present Illness     Zelalem Pepe is a 74 y.o. male with Left knee pain. Gene notes pain to the lateral aspect of his left knee. Pain started approx. 1 month ago after getting up from a weird sitting positioning and hearing a \"pop\". He notes pain will worsen with squatting or with steps. He denies any swelling. Overall symptoms have improved. He is taking OTC NSAID's as needed for pain control. He denies any catching or locking. He has a history of a medial meniscus surgery in the 1980's.      Review of Systems     Review of Systems   Constitutional:  Negative for chills, fever and unexpected weight change.   HENT:  Negative for hearing loss, nosebleeds and sore throat.    Eyes:  Negative for pain, redness and visual disturbance.   Respiratory:  Negative for cough, shortness of breath and wheezing.    Cardiovascular:  Negative for chest pain, palpitations and leg swelling.   Gastrointestinal:  Negative for abdominal pain, nausea and vomiting.   Endocrine: Negative for polydipsia and polyuria.   Genitourinary:  Negative for difficulty urinating and hematuria.   Musculoskeletal:  Positive for arthralgias. Negative for joint swelling and myalgias.   Skin:  Negative for rash and wound.   Neurological:  Negative for dizziness, numbness and headaches.   Psychiatric/Behavioral:  Negative for decreased concentration, dysphoric mood and suicidal ideas. The patient is not nervous/anxious.        Physical Exam     /77   Pulse 73   Ht 6' 1.5\" (1.867 m)   Wt 132 kg (292 lb)   BMI 38.00 kg/m²     Left Knee  Range of motion from 0 to 130.    There is no crepitus with range of motion.   There is no effusion.    There is mild tenderness over the lateral joint line.  No " medial joint line tenderness.     There is 5/5 quadriceps strength  The patient is able to perform a straight leg raise.      negative patellar grind test.  Anterior drawer tests is negative  negative Lachman Test.   Posterior drawer test is   negative   Varus stress testing reveals stable at 0 and 30 degrees   Valgus stress testing reveals stable at 0 and 30 degrees  Syeda's testing is negative   The patient is neurovascular intact distally.      Eyes:  Anicteric sclerae.  Neck:  Supple.  Lungs:  Normal respiratory effort.  Cardiovascular:  Capillary refill is less than 2 seconds.  Skin:  Intact without erythema.  Neurologic:  Sensation grossly intact to light touch.  Psychiatric:  Mood and affect are appropriate.    Data Review     I have personally reviewed pertinent films in PACS, and my interpretation follows:    X-rays taken 1/3/24 of Left knee independently reviewed by Dr. Patton and demonstrate mild medial compartmental narrowing with osteophyte formation.     MRI of the left knee taken 1/14/24 was independently reviewed by Dr. Patton and demonstrate degenerative tear medial meniscus with flipped fragment in gutter, mild medial and patellofemoral degenerative changes noted.    Past Medical History:   Diagnosis Date    Allergic 02.2020    OTC care    Anxiety 08.1978    on going care    Back injury     Chronic back pain     Depression 09/16/2016    Diabetes mellitus (HCC)     Disease of thyroid gland     Kidney stone 04.1990    passed it    Otitis media 03.1960    treated    Pulmonary embolism (HCC)     SOB (shortness of breath)     Thyroid disease        Past Surgical History:   Procedure Laterality Date    KNEE SURGERY      TONSILLECTOMY         No Known Allergies    Current Outpatient Medications on File Prior to Visit   Medication Sig Dispense Refill    ALPRAZolam (XANAX) 2 MG tablet Take 1 tablet (2 mg total) by mouth in the morning 90 tablet 0    apixaban (Eliquis) 5 mg Take 1 tablet (5 mg total)  by mouth 2 (two) times a day 180 tablet 1    citalopram (CeleXA) 40 mg tablet Take 1 tablet (40 mg total) by mouth daily 90 tablet 1    gabapentin (Neurontin) 100 mg capsule Take 1 capsule (100 mg total) by mouth 2 (two) times a day Take with 300mg for total dose of 400mg BID. 180 capsule 1    gabapentin (NEURONTIN) 300 mg capsule Take 1 capsule (300 mg total) by mouth 2 (two) times a day 180 capsule 1    Influenza Virus Vacc Split PF 0.5 ML SUSI Afluria 1551-9463(PF) 45 mcg (15 mcg x 3)/0.5 mL intramuscular syringe   inject 0.5 milliliter intramuscularly      levothyroxine 100 mcg tablet Take 1 tablet (100 mcg total) by mouth daily in the early morning 90 tablet 1    metFORMIN (GLUCOPHAGE) 500 mg tablet Take 1 tablet (500 mg total) by mouth daily 90 tablet 1    neomycin-polymyxin-hydrocortisone (CORTISPORIN) otic solution Administer 4 drops into the left ear every 6 (six) hours for 7 days 10 mL 0    [DISCONTINUED] [employer] COVID-19 Ag Home Test KIT Use as directed (Patient not taking: Reported on 2023)      [DISCONTINUED] methylPREDNISolone 4 MG tablet therapy pack Use as directed on package 21 each 0     No current facility-administered medications on file prior to visit.       Social History     Tobacco Use    Smoking status: Former     Current packs/day: 0.00     Average packs/day: 0.5 packs/day for 32.4 years (16.2 ttl pk-yrs)     Types: Cigarettes     Start date: 1968     Quit date: 2001     Years since quittin.1    Smokeless tobacco: Never    Tobacco comments:     never a heave smoker   Vaping Use    Vaping status: Never Used   Substance Use Topics    Alcohol use: Yes     Alcohol/week: 1.0 standard drink of alcohol     Types: 1 Cans of beer per week     Comment: occasional. 1 drink/wk     Drug use: Not Currently     Types: Marijuana     Comment: have not smoked since PE       Family History   Problem Relation Age of Onset    Hypothyroidism Mother     Mental illness Mother         manic  depression    Stroke Mother     Depression Mother         manic depression    Bipolar disorder Mother         complete denial    Hypothyroidism Sister     Cancer Sister     Cancer Father         prostate and bone marrow    Cancer Maternal Grandfather         pancreatic    Thyroid disease Sister     Autoimmune disease Sister     Cancer Sister         uterine    Cancer Maternal Uncle         Pancreatic             Procedures Performed     Large joint arthrocentesis: L knee  Universal Protocol:  Consent: Verbal consent obtained. Written consent not obtained.  Risks and benefits: risks, benefits and alternatives were discussed  Consent given by: patient  Patient understanding: patient states understanding of the procedure being performed  Site marked: the operative site was marked  Patient identity confirmed: verbally with patient  Supporting Documentation  Indications: pain   Procedure Details  Location: knee - L knee  Preparation: Patient was prepped and draped in the usual sterile fashion  Needle size: 22 G  Ultrasound guidance: no  Medications administered: 2 mL bupivacaine 0.25 %; 2 mL lidocaine 1 %; 2 mL methylPREDNISolone acetate 40 mg/mL    Patient tolerance: patient tolerated the procedure well with no immediate complications  Dressing:  Sterile dressing applied        Scribe Attestation      I,:  Mary Philippe am acting as a scribe while in the presence of the attending physician.:       I,:  Antolin Patton DO personally performed the services described in this documentation    as scribed in my presence.:

## 2024-04-22 ENCOUNTER — TELEPHONE (OUTPATIENT)
Dept: HEMATOLOGY ONCOLOGY | Facility: CLINIC | Age: 74
End: 2024-04-22

## 2024-04-22 NOTE — TELEPHONE ENCOUNTER
Labs came into my inbox from 1/29/2020 from Fernando Wallace (listed as [facility])  Sent him an email questioning why he sent them to me.

## 2024-05-02 ENCOUNTER — VBI (OUTPATIENT)
Dept: ADMINISTRATIVE | Facility: OTHER | Age: 74
End: 2024-05-02

## 2024-06-17 ENCOUNTER — TELEPHONE (OUTPATIENT)
Dept: PAIN MEDICINE | Facility: CLINIC | Age: 74
End: 2024-06-17

## 2024-06-17 ENCOUNTER — OFFICE VISIT (OUTPATIENT)
Dept: OBGYN CLINIC | Facility: CLINIC | Age: 74
End: 2024-06-17
Payer: COMMERCIAL

## 2024-06-17 VITALS
SYSTOLIC BLOOD PRESSURE: 126 MMHG | HEART RATE: 78 BPM | HEIGHT: 74 IN | DIASTOLIC BLOOD PRESSURE: 86 MMHG | WEIGHT: 287.2 LBS | BODY MASS INDEX: 36.86 KG/M2

## 2024-06-17 DIAGNOSIS — S83.242A TEAR OF MEDIAL MENISCUS OF LEFT KNEE, UNSPECIFIED TEAR TYPE, UNSPECIFIED WHETHER OLD OR CURRENT TEAR, INITIAL ENCOUNTER: ICD-10-CM

## 2024-06-17 DIAGNOSIS — M54.41 CHRONIC BILATERAL LOW BACK PAIN WITH BILATERAL SCIATICA: ICD-10-CM

## 2024-06-17 DIAGNOSIS — M25.562 CHRONIC PAIN OF LEFT KNEE: ICD-10-CM

## 2024-06-17 DIAGNOSIS — G89.29 CHRONIC PAIN OF LEFT KNEE: ICD-10-CM

## 2024-06-17 DIAGNOSIS — G89.29 CHRONIC BILATERAL LOW BACK PAIN WITH BILATERAL SCIATICA: ICD-10-CM

## 2024-06-17 DIAGNOSIS — M54.42 CHRONIC BILATERAL LOW BACK PAIN WITH BILATERAL SCIATICA: ICD-10-CM

## 2024-06-17 DIAGNOSIS — M17.12 PRIMARY OSTEOARTHRITIS OF LEFT KNEE: Primary | ICD-10-CM

## 2024-06-17 PROCEDURE — 99213 OFFICE O/P EST LOW 20 MIN: CPT | Performed by: ORTHOPAEDIC SURGERY

## 2024-06-17 RX ORDER — AMOXICILLIN 500 MG/1
500 CAPSULE ORAL 3 TIMES DAILY
COMMUNITY
Start: 2024-06-10 | End: 2024-06-21 | Stop reason: ALTCHOICE

## 2024-06-17 RX ORDER — OXYCODONE HYDROCHLORIDE AND ACETAMINOPHEN 5; 325 MG/1; MG/1
1 TABLET ORAL
COMMUNITY
Start: 2024-06-10 | End: 2024-06-21 | Stop reason: ALTCHOICE

## 2024-06-17 NOTE — TELEPHONE ENCOUNTER
Caller: Rudi    Doctor: Leta    Reason for call: patient being recommended by Ortho for injection with Dr. Gillette     Call back#: [phone]

## 2024-06-17 NOTE — PROGRESS NOTES
Patient Name:  Zelalem Pepe  MRN:  [id number]    Assessment & Plan     1. Primary osteoarthritis of left knee  2. Tear of medial meniscus of left knee, unspecified tear type, unspecified whether old or current tear, initial encounter  3. Chronic pain of left knee  4. Chronic bilateral low back pain with bilateral sciatica  -     Ambulatory referral to Spine & Pain Management; Future  -     Ambulatory Referral to Physical Therapy; Future      Left knee osteoarthritis and degenerative meniscus tearing with a history of medial meniscus surgery in the 1980's, degenerative meniscus tear  Overall the patient is doing well s/p CSI on 3/15/2024  Continue home exercises and staying active   OTC analgesics as needed  He was referred to Dr. Gillette to evaluate his low back pain  The patient was referred to outpatient physical therapy  He will follow up as needed      History of the Present Illness   Zelalem Pepe is a 74 y.o. male with Left knee osteoarthritis and degenerative meniscus tearing with a history of medial meniscus surgery in the 1980's, degenerative meniscus tear. He was last seen in the office on 3/15/2024 when he was provided a corticosteroid injection. He is still having benefit from the injection. The patient is not having a lot of pain today. He describes discomfort when going up and down stairs. The patient denies mechanical locking and catching. The patient admits he is having low back pain and bilateral sciatica symptoms. He numbness numbness and pain. The patient admits the pain normally does not go past his knees.        Review of Systems     Review of Systems   Constitutional:  Negative for chills and fever.   HENT:  Negative for ear pain and sore throat.    Eyes:  Negative for pain and visual disturbance.   Respiratory:  Negative for cough and shortness of breath.    Cardiovascular:  Negative for chest pain and palpitations.   Gastrointestinal:  Negative for abdominal pain and vomiting.  "  Genitourinary:  Negative for dysuria and hematuria.   Musculoskeletal:  Negative for arthralgias and back pain.   Skin:  Negative for color change and rash.   Neurological:  Negative for seizures and syncope.   All other systems reviewed and are negative.      Physical Exam     /86   Pulse 78   Ht 6' 2\" (1.88 m)   Wt 130 kg (287 lb 3.2 oz)   BMI 36.87 kg/m²     Left Knee  Range of motion from 0 to 130.    There is no effusion.    There is no tenderness over the knee.    The patient is able to perform a straight leg raise with 5/5 strength.    Varus stress testing reveals no instability at 0 and 30 degrees   Valgus stress testing reveals no instability at 0 and 30 degrees  The patient is neurovascular intact distally.    Lumbar spine  There is no midline tenderness present.    There is bilateral paraspinal tenderness.    Sensation intact to light touch left L2 through S1 dermatomes.   Sensation intact to light touch right L2 through S1 dermatomes   Left Motor: 5/5 iliopsoas, 5/5 quadriceps, 5/5 tibialis anterior, 5/5 extensor hallucis longus, and 5/5 gastrocsoleus.   Right Motor: 5/5 iliopsoas, 5/5 quadriceps, 5/5 tibialis anterior, 5/5 extensor hallucis longus, and 5/5 gastrocsoleus.   Negative clonus bilaterally.    Negative Babinski bilaterally  Straight leg raise test is negative Bilateral   The patient is well perfused distally.      Data Review     I have personally reviewed pertinent films in PACS, and my interpretation follows.    X-rays taken 1/3/24 of Left knee independently reviewed by Dr. Patton and demonstrate mild medial compartmental narrowing with osteophyte formation.      MRI of the left knee taken 1/14/24 was independently reviewed by Dr. Patton and demonstrate degenerative tear medial meniscus with flipped fragment in gutter, mild medial and patellofemoral degenerative changes noted.    Social History     Tobacco Use   • Smoking status: Former     Current packs/day: 0.00     " Average packs/day: 0.5 packs/day for 32.4 years (16.2 ttl pk-yrs)     Types: Cigarettes     Start date: 1968     Quit date: 2001     Years since quittin.3   • Smokeless tobacco: Never   • Tobacco comments:     never a heave smoker   Vaping Use   • Vaping status: Never Used   Substance Use Topics   • Alcohol use: Yes     Alcohol/week: 1.0 standard drink of alcohol     Types: 1 Cans of beer per week     Comment: occasional. 1 drink/wk    • Drug use: Not Currently     Types: Marijuana     Comment: have not smoked since PE           Procedures  None.    Damaris Finch   Scribe Attestation    I,:  Damaris Finch am acting as a scribe while in the presence of the attending physician.:       I,:  Antolin Patton, DO personally performed the services described in this documentation    as scribed in my presence.:

## 2024-06-17 NOTE — TELEPHONE ENCOUNTER
Pt was referred by Dr Patton for a consult with Dr Gillette, not injection.  Can not schedule pt for injection, needs to be scheduled for new pt/consult appt.

## 2024-06-18 NOTE — TELEPHONE ENCOUNTER
LMOM that he needs to schedule the consult with Dr Gillette first to evaluate and discuss treatment options

## 2024-06-18 NOTE — TELEPHONE ENCOUNTER
Caller: Patient    Doctor: Pooja    Reason for call: Patient called to scheduled injection with spine and pain who advised they couldn't schedule him because the referral was for a consult. They requested it be changed to injection    Call back#: [phone]

## 2024-06-19 ENCOUNTER — TELEPHONE (OUTPATIENT)
Age: 74
End: 2024-06-19

## 2024-06-19 NOTE — TELEPHONE ENCOUNTER
Caller: Pateint     Doctor: Pooja    Reason for call: Patient was referred to Dr. Gillette to evaluate his lower back. He is an established patient with Dr. Gillette and was informed he needs a referral for an injection, not a referral for a consult.    Call back#: [phone]

## 2024-06-21 ENCOUNTER — PROCEDURE VISIT (OUTPATIENT)
Dept: FAMILY MEDICINE CLINIC | Facility: CLINIC | Age: 74
End: 2024-06-21
Payer: COMMERCIAL

## 2024-06-21 VITALS
BODY MASS INDEX: 37.09 KG/M2 | HEIGHT: 74 IN | DIASTOLIC BLOOD PRESSURE: 80 MMHG | SYSTOLIC BLOOD PRESSURE: 120 MMHG | HEART RATE: 70 BPM | WEIGHT: 289 LBS | OXYGEN SATURATION: 94 %

## 2024-06-21 DIAGNOSIS — G89.29 CHRONIC BILATERAL LOW BACK PAIN WITH BILATERAL SCIATICA: ICD-10-CM

## 2024-06-21 DIAGNOSIS — M54.42 CHRONIC BILATERAL LOW BACK PAIN WITH BILATERAL SCIATICA: ICD-10-CM

## 2024-06-21 DIAGNOSIS — H61.23 BILATERAL IMPACTED CERUMEN: ICD-10-CM

## 2024-06-21 DIAGNOSIS — R73.03 PREDIABETES: ICD-10-CM

## 2024-06-21 DIAGNOSIS — F41.9 ANXIETY: Primary | ICD-10-CM

## 2024-06-21 DIAGNOSIS — M54.41 CHRONIC BILATERAL LOW BACK PAIN WITH BILATERAL SCIATICA: ICD-10-CM

## 2024-06-21 DIAGNOSIS — E03.9 ACQUIRED HYPOTHYROIDISM: ICD-10-CM

## 2024-06-21 DIAGNOSIS — Z79.899 OTHER LONG TERM (CURRENT) DRUG THERAPY: ICD-10-CM

## 2024-06-21 DIAGNOSIS — M54.32 SCIATICA OF LEFT SIDE: ICD-10-CM

## 2024-06-21 PROCEDURE — 99214 OFFICE O/P EST MOD 30 MIN: CPT | Performed by: NURSE PRACTITIONER

## 2024-06-21 PROCEDURE — 69209 REMOVE IMPACTED EAR WAX UNI: CPT | Performed by: NURSE PRACTITIONER

## 2024-06-21 RX ORDER — ALPRAZOLAM 2 MG/1
2 TABLET ORAL DAILY
Qty: 90 TABLET | Refills: 0 | Status: SHIPPED | OUTPATIENT
Start: 2024-06-21

## 2024-06-21 RX ORDER — GABAPENTIN 300 MG/1
300 CAPSULE ORAL 2 TIMES DAILY
Qty: 180 CAPSULE | Refills: 1 | Status: SHIPPED | OUTPATIENT
Start: 2024-06-21

## 2024-06-21 RX ORDER — CITALOPRAM 40 MG/1
40 TABLET ORAL DAILY
Qty: 90 TABLET | Refills: 1 | Status: SHIPPED | OUTPATIENT
Start: 2024-06-21

## 2024-06-21 RX ORDER — GABAPENTIN 100 MG/1
100 CAPSULE ORAL 2 TIMES DAILY
Qty: 180 CAPSULE | Refills: 1 | Status: SHIPPED | OUTPATIENT
Start: 2024-06-21

## 2024-06-21 RX ORDER — LEVOTHYROXINE SODIUM 0.1 MG/1
100 TABLET ORAL
Qty: 90 TABLET | Refills: 1 | Status: SHIPPED | OUTPATIENT
Start: 2024-06-21

## 2024-06-21 RX ORDER — NALOXONE HYDROCHLORIDE 4 MG/.1ML
SPRAY NASAL
Qty: 1 EACH | Refills: 1 | Status: SHIPPED | OUTPATIENT
Start: 2024-06-21 | End: 2025-06-21

## 2024-06-21 NOTE — ASSESSMENT & PLAN NOTE
Patient is on xanax daily  Contract up to date   He is also on celexa 40mg   UDS collected yesterday

## 2024-06-21 NOTE — PROGRESS NOTES
Ambulatory Visit  Name: Zelalem Pepe      : 1950      MRN: [id number]  Encounter Provider: REMY Miranda  Encounter Date: 2024   Encounter department: [facility]    Assessment & Plan   1. Anxiety  Assessment & Plan:  Patient is on xanax daily  Contract up to date   He is also on celexa 40mg   UDS collected yesterday   Orders:  -     ALPRAZolam (XANAX) 2 MG tablet; Take 1 tablet (2 mg total) by mouth in the morning  -     citalopram (CeleXA) 40 mg tablet; Take 1 tablet (40 mg total) by mouth daily  -     naloxone (NARCAN) 4 mg/0.1 mL nasal spray; Administer 1 spray into a nostril. If no response after 2-3 minutes, give another dose in the other nostril using a new spray.  2. Sciatica of left side  Assessment & Plan:  Secondary to back pain  Orders:  -     gabapentin (NEURONTIN) 300 mg capsule; Take 1 capsule (300 mg total) by mouth 2 (two) times a day  3. Acquired hypothyroidism  Assessment & Plan:  Patient is due for labs. He is on levothyroxine 100mcg   He has labs already ordered reminded to complete   Orders:  -     levothyroxine 100 mcg tablet; Take 1 tablet (100 mcg total) by mouth daily in the early morning  4. Prediabetes  Assessment & Plan:  Patient is on metformin  Patient reminded to get his labs done   Orders:  -     metFORMIN (GLUCOPHAGE) 500 mg tablet; Take 1 tablet (500 mg total) by mouth daily  5. Bilateral impacted cerumen  Assessment & Plan:  Successful irrigation of both ears with improved hearing   Orders:  -     Ear cerumen removal  6. Chronic bilateral low back pain with bilateral sciatica  Assessment & Plan:  Patient is on gabapentin 400mg BID which provides relief   Orders:  -     gabapentin (Neurontin) 100 mg capsule; Take 1 capsule (100 mg total) by mouth 2 (two) times a day Take with 300mg for total dose of 400mg BID.  7. Other long term (current) drug therapy  -     [facility] Prescribed Meds and Special Instructions  -     Amphetamines,  "Methamphetamines  -     Butalbital  -     Phenobarbital  -     Secobarbital  -     Alprazolam  -     Clonazepam  -     Diazepam, Temazepam, Oxazepam  -     Lorazepam  -     Gabapentin  -     Pregabalin  -     Cocaine  -     Heroin  -     Buprenorphine  -     Levorphanol  -     Meperidine  -     Naltrexone  -     Fentanyl  -     Methadone  -     Oxycodone  -     Tapentadol  -     THC  -     Tramadol  -     Codeine, Hydrocodone, Hydropmorphone, Morphine  -     Bath Salts  -     Ethyl Glucuronide/Ethyl Sulfate  -     Kratom  -     Spice  -     Methylphenidate  -     Phentermine  -     Validity Oxidant  -     Validity Creatinine  -     Validity pH  -     Validity Specific  -     Xylazine Definitive Test      Depression Screening and Follow-up Plan: Patient was screened for depression during today's encounter. They screened negative with a PHQ-9 score of 4.      History of Present Illness   {Disappearing Hyperlinks I Encounters * My Last Note * Since Last Visit * History :23138}  Knee pain went away   Back acts up sometimes  Following with pain and spine  Diffilcut hearing for about 3 weeks     Patient has labs in may that he did not complete    Ear Fullness   Associated symptoms include hearing loss.   Back Pain        Review of Systems   HENT:  Positive for hearing loss.    Musculoskeletal:  Positive for back pain.   Psychiatric/Behavioral:  Negative for dysphoric mood. The patient is not nervous/anxious.        Objective   {Disappearing Hyperlinks   Review Vitals * Enter New Vitals * Results Review * Labs * Imaging * Cardiology * Procedures * Lung Cancer Screening :77258}  /80 (BP Location: Left arm, Patient Position: Sitting, Cuff Size: Adult)   Pulse 70   Ht 6' 1.5\" (1.867 m)   Wt 131 kg (289 lb)   SpO2 94%   BMI 37.61 kg/m²     Physical Exam  Vitals and nursing note reviewed.   Constitutional:       Appearance: Normal appearance. He is well-developed. He is obese. He is not ill-appearing.   HENT:      " Head: Normocephalic and atraumatic.      Right Ear: There is impacted cerumen.      Left Ear: There is impacted cerumen.      Ears:      Comments: Clear TMs after wax removal  Eyes:      Extraocular Movements: Extraocular movements intact.      Conjunctiva/sclera: Conjunctivae normal.      Pupils: Pupils are equal.   Cardiovascular:      Rate and Rhythm: Normal rate and regular rhythm.      Heart sounds: S1 normal and S2 normal. No murmur heard.  Pulmonary:      Effort: Pulmonary effort is normal. No respiratory distress.      Breath sounds: Normal breath sounds.   Neurological:      Mental Status: He is alert and oriented to person, place, and time.   Psychiatric:         Mood and Affect: Mood normal.         Behavior: Behavior normal.         Thought Content: Thought content normal.         Judgment: Judgment normal.         Ear cerumen removal    Date/Time: 6/21/2024 8:00 AM    Performed by: REMY Romero  Authorized by: REMY Romero    Patient location:  Clinic  Procedure details:     Local anesthetic:  None    Location:  L ear and R ear    Procedure type: irrigation only      Approach:  Natural orifice  Post-procedure details:     Complication:  None    Hearing quality:  Improved    Patient tolerance of procedure:  Tolerated well, no immediate complications      Administrative Statements {Disappearing Hyperlinks I  Level of Service * [facility] [place]}

## 2024-06-21 NOTE — ASSESSMENT & PLAN NOTE
Patient is due for labs. He is on levothyroxine 100mcg   He has labs already ordered reminded to complete

## 2024-06-21 NOTE — ASSESSMENT & PLAN NOTE
Outpatient Psychiatry Progress Note     Provider: TINY Knox CNS  Date: 2017  Service:  Medication follow up with counseling.   Patient Identification: Sharon Baer  : 6/10/1929   MRN: [id number]    Sharon Baer is a 88 year old year old male who presents for ongoing psychiatric care.  Sharon Baer was last seen in clinic on 17.   At that time,   Assessment & Plan       Sharon Baer is seen today for follow up with his sister Josephine.  Patient reports he has been more depressed and having mild increase in intrusive thoughts.  Although Josephine reports that she sets up his medications and gives him 60mg of Fluoxetine, Reilly state that he takes one out and only takes 2 of them.  It is not clear what happens to the extra since there are not in the med keeper when Josephine sets them up.  Since prior to the increase to 60mg he was taking 50mg (two 20mg and one 10mg capsule from  to 17), it is also unclear if he had been taking this dose.  Do to possibility that he is taking 60mg and may be more anxious at this time will decrease Fluoxetine to 40mg and see if mood changes or is the same.  Possibly increase symptoms are due to changes in staff resulting in increase isolation at his care center.  With family, his mood and behavior have not changed and he continues to be more social with them.     Diagnosis  Autism, Spectrum, Anxiety,  OCD, other type, Depression Unspecified     Plan:  Medication: Decrease Fluoxetine to 40mg again, Continue current dose of Seroquel  OTC Recommendations: none  Lab Orders:  none  Referrals: none  Release of Information: none  Future Treatment Considerations:per symptom improvement or worsening  Return for Follow Up:4 weeks      ____________________________________________________________________________________________________________________________________________    2017  Today Sharon reports he is feeling about the same.  Secondary to back pain   His sister Josephine accompanies him today and reports though that he is much better.  Is talking more with his family and appears happier  Side effects of medication include: none known  Psychiatric Review of Systems:  The patient endorses symptoms of depression: In the last 2 weeks per PHQ 9 more than several days anhedonia, feeling depressed, fatigue, feelings of failure, restless/lethargy. Denies SI  He  patient endorses symptoms of anxiety : stable  He endorses symptoms of vidya including none.    He endorses symptoms of psychosis including no psychotic symptoms.       Review of Medical Systems:  Sleep: sleeps about 4 hours a day and has some difficulty falling asleep  Energy: stable  Concentration: stable  Appetite: stable  GI Concerns: no new concerns  Cardiac concerns: none  Neurological concerns: none  Other medical concerns: no new concerns  Current Substance Use:  Alcohol:denies  Other drugs:denies  Caffeine:no change  Nicotine: none  Past Medical History:   Past Medical History:   Diagnosis Date     Autism spectrum disorder 7/16/2015     GERD (gastroesophageal reflux disease)      Gout      Hearing loss      Hyperlipemia     on atorvastatin     Hypertension      Hypotestosteronism      IGT (impaired glucose tolerance)      OCD (obsessive compulsive disorder)     on cymbalta     Osteoarthritis, hip, bilateral      Vitamin D deficiency     resolved 2/2013     Patient Active Problem List   Diagnosis     Gout     Hypertension     IGT (impaired glucose tolerance)     GERD (gastroesophageal reflux disease)     Hypotestosteronism     Neoplasm of uncertain behavior of skin     AK (actinic keratosis)     History of nonmelanoma skin cancer     Osteoarthritis of right hip     OA (osteoarthritis) of hip     Fall     Facial laceration     Autism spectrum disorder     Depression, unspecified depression type     Vitamin D deficiency     Loss of weight     ACP (advance care planning)     Other obsessive-compulsive disorder  "    Other specified anxiety disorders       Allergies:   Allergies   Allergen Reactions     Penicillins Rash and Other (See Comments)     He believes he had a positive skin test for PCN allergy at the time of a dog bite, about 1960.          Current Medications     Current Outpatient Prescriptions Ordered in [facility]   Medication Sig Dispense Refill     omeprazole (PRILOSEC) 40 MG capsule Take 1 capsule (40 mg) by mouth daily Reported by family       QUEtiapine (SEROQUEL) 25 MG tablet Take one tablet by mouth every evening with supper 90 tablet 1     FLUoxetine (PROZAC) 20 MG capsule Take 2 capsules (40 mg) by mouth every morning Discontinue order for 3 capsules daily 180 capsule 1     lisinopril (PRINIVIL/ZESTRIL) 40 MG tablet Take 1 tablet (40 mg) by mouth daily 100 tablet 3     atorvastatin (LIPITOR) 20 MG tablet Take 1 tablet (20 mg) by mouth daily 100 tablet 3     allopurinol (ZYLOPRIM) 100 MG tablet Take 1 tablet (100 mg) by mouth daily 100 tablet 3     acetaminophen 500 MG CAPS Take 500 mg by mouth 2 times daily       cholecalciferol (VITAMIN D) 1000 UNIT tablet Take 2 tablets (2,000 Units) by mouth daily 100 tablet 3     furosemide (LASIX) 20 MG tablet Take 1 tablet (20 mg) by mouth daily 100 tablet 3     aspirin 325 MG EC tablet Take 1 tablet (325 mg) by mouth daily 90 tablet 3     No current Epic-ordered facility-administered medications on file.         Mental Status Exam     Appearance:  Casually dressed and Well groomed  Behavior/relationship to examiner/demeanor:  Cooperative but hard of hearing  Orientation: Oriented to person, place, time and situation  Psychomotor: slowed  Speech Rate:  Normal  Speech Spontaneity:  Mild latency and poverty  Mood:  \"About the same\"  Affect:  Appropriate/mood-congruent  Thought Process (Associations):  Goal directed and Circumstantial  Thought Content:  no overt psychosis, denies suicidal ideation, intent or thoughts and patient does not appear to be responding to " internal stimuli  Abnormal Perception:  None  Attention/Concentration:  Normal  Language:  Intact  Insight:  Good  Judgment:  Good      Results     Vital signs: /84  Pulse 84  Wt 87.5 kg (193 lb)  BMI 30.22 kg/m2    Laboratory Data:  no new data available    Assessment & Plan      Sharon Baer is seen today for follow up and reports he is feeling about the same but his sister notes he has been much better- more outgoing and talkative.  Today he appears less anxious, more verbal and happy.  Josephine reports that the his biggest concern is loneliness and is wondering about services for visiting companions.  Diagnosis  Autism, Spectrum, Anxiety,  OCD, other type, Depression Unspecified    Plan:  Medication: Continued current medication  OTC Recommendations: none  Lab Orders:  none  Referrals: will  Have Kati Montes De Oca Carlson call Josephine about information possible visting problems  Release of Information: none  Future Treatment Considerations:per symptoms  Return for Follow Up: 3 months   The risks, benefits, alternatives and side effects have been discussed and are understood by the patient. The patient understands the risks of using street drugs or alcohol. There are no medical contraindications, the patient agrees to treatment, and has the capacity to do so. The patient understands to call 911 or come to the nearest ED if life threatening or urgent symptoms present.  In addition time was spent counseling the patient and/or coordinating care regarding review of social and occupational functioning.  In addition patient was counseled on health and wellness practices to augment medication treatment of symptoms. See note for details.    TINY Knox CNS 12/6/2017

## 2024-06-25 LAB
4OH-XYLAZINE UR QL CFM: NEGATIVE NG/ML
6MAM UR QL CFM: NEGATIVE NG/ML
7AMINOCLONAZEPAM UR QL CFM: NEGATIVE NG/ML
A-OH ALPRAZ UR QL CFM: NORMAL NG/ML
ACCEPTABLE CREAT UR QL: NORMAL MG/DL
ACCEPTIBLE SP GR UR QL: NORMAL
AMPHET UR QL CFM: NEGATIVE NG/ML
BUPRENORPHINE UR QL CFM: NEGATIVE NG/ML
BUTALBITAL UR QL CFM: NEGATIVE NG/ML
BZE UR QL CFM: NEGATIVE NG/ML
CODEINE UR QL CFM: NEGATIVE NG/ML
EDDP UR QL CFM: NEGATIVE NG/ML
ETHYL GLUCURONIDE UR QL CFM: NEGATIVE NG/ML
ETHYL SULFATE UR QL SCN: NEGATIVE NG/ML
EUTYLONE UR QL: NEGATIVE NG/ML
FENTANYL UR QL CFM: NEGATIVE NG/ML
GLIADIN IGG SER IA-ACNC: NEGATIVE NG/ML
HYDROCODONE UR QL CFM: NEGATIVE NG/ML
HYDROMORPHONE UR QL CFM: NEGATIVE NG/ML
LORAZEPAM UR QL CFM: NEGATIVE NG/ML
ME-PHENIDATE UR QL CFM: NEGATIVE NG/ML
MEPERIDINE UR QL CFM: NEGATIVE NG/ML
METHADONE UR QL CFM: NEGATIVE NG/ML
METHAMPHET UR QL CFM: NEGATIVE NG/ML
MORPHINE UR QL CFM: NEGATIVE NG/ML
NALTREXONE UR QL CFM: NEGATIVE NG/ML
NITRITE UR QL: NORMAL UG/ML
NORBUPRENORPHINE UR QL CFM: NEGATIVE NG/ML
NORDIAZEPAM UR QL CFM: NEGATIVE NG/ML
NORFENTANYL UR QL CFM: NEGATIVE NG/ML
NORHYDROCODONE UR QL CFM: NEGATIVE NG/ML
NORMEPERIDINE UR QL CFM: NEGATIVE NG/ML
NOROXYCODONE UR QL CFM: NEGATIVE NG/ML
OXAZEPAM UR QL CFM: NEGATIVE NG/ML
OXYCODONE UR QL CFM: NEGATIVE NG/ML
OXYMORPHONE UR QL CFM: NEGATIVE NG/ML
PARA-FLUOROFENTANYL QUANTIFICATION: NORMAL NG/ML
PHENOBARB UR QL CFM: NEGATIVE NG/ML
RESULT ALL_PRESCRIBED MEDS AND SPECIAL INSTRUCTIONS: NORMAL
SECOBARBITAL UR QL CFM: NEGATIVE NG/ML
SL AMB 4-ANPP QUANTIFICATION: NORMAL NG/ML
SL AMB 5F-ADB-M7 METABOLITE QUANTIFICATION: NEGATIVE NG/ML
SL AMB 7-OH-MITRAGYNINE (KRATOM ALKALOID) QUANTIFICATION: NEGATIVE NG/ML
SL AMB AB-FUBINACA-M3 METABOLITE QUANTIFICATION: NEGATIVE NG/ML
SL AMB ACETYL FENTANYL QUANTIFICATION: NORMAL NG/ML
SL AMB ACETYL NORFENTANYL QUANTIFICATION: NORMAL NG/ML
SL AMB ACRYL FENTANYL QUANTIFICATION: NORMAL NG/ML
SL AMB CARFENTANIL QUANTIFICATION: NORMAL NG/ML
SL AMB CTHC (MARIJUANA METABOLITE) QUANTIFICATION: ABNORMAL NG/ML
SL AMB DEXTRORPHAN (DEXTROMETHORPHAN METABOLITE) QUANT: NEGATIVE NG/ML
SL AMB GABAPENTIN QUANTIFICATION: NORMAL NG/ML
SL AMB JWH018 METABOLITE QUANTIFICATION: NEGATIVE NG/ML
SL AMB JWH073 METABOLITE QUANTIFICATION: NEGATIVE NG/ML
SL AMB MDMB-FUBINACA-M1 METABOLITE QUANTIFICATION: NEGATIVE NG/ML
SL AMB METHYLONE QUANTIFICATION: NEGATIVE NG/ML
SL AMB N-DESMETHYL-TRAMADOL QUANTIFICATION: NEGATIVE NG/ML
SL AMB PHENTERMINE QUANTIFICATION: NEGATIVE NG/ML
SL AMB PREGABALIN QUANTIFICATION: NEGATIVE NG/ML
SL AMB RCS4 METABOLITE QUANTIFICATION: NEGATIVE NG/ML
SL AMB RITALINIC ACID QUANTIFICATION: NEGATIVE NG/ML
SMOOTH MUSCLE AB TITR SER IF: NEGATIVE NG/ML
SPECIMEN DRAWN SERPL: NEGATIVE NG/ML
SPECIMEN PH ACCEPTABLE UR: NORMAL
TAPENTADOL UR QL CFM: NEGATIVE NG/ML
TEMAZEPAM UR QL CFM: NEGATIVE NG/ML
TRAMADOL UR QL CFM: NEGATIVE NG/ML
URATE/CREAT 24H UR: NEGATIVE NG/ML
XYLAZINE UR QL CFM: NEGATIVE NG/ML

## 2024-07-16 ENCOUNTER — TELEPHONE (OUTPATIENT)
Dept: PAIN MEDICINE | Facility: CLINIC | Age: 74
End: 2024-07-16

## 2024-07-21 PROBLEM — H61.23 BILATERAL IMPACTED CERUMEN: Status: RESOLVED | Noted: 2020-09-16 | Resolved: 2024-07-21

## 2024-07-22 NOTE — PROGRESS NOTES
Assessment:  1. Spinal stenosis of lumbar region with neurogenic claudication        Plan:  Orders Placed This Encounter   Procedures    FL spine and pain procedure     Standing Status:   Future     Standing Expiration Date:   7/23/2028     Order Specific Question:   Reason for Exam:     Answer:   L5-S1 LESI     Order Specific Question:   Anticoagulant hold needed?     Answer:   eliquis       No orders of the defined types were placed in this encounter.      My impressions and treatment recommendations were discussed in detail with the patient, who verbalized understanding and had no further questions.    74-year-old male presents her office chief complaint of back and bilateral buttock and leg pain with neurogenic claudication symptoms secondary to spinal stenosis.  Has notable degenerative spinal stenosis at the L4-5 level which is likely responsible for his symptoms.  Denies bowel bladder incontinence or saddle anesthesia.  He has had epidural injections in the past with notable relief.  We discussed repeating epidural injection fluoroscopic guidance at the L5-S1 level.  If no relief, then may consider bilateral L4 TFESI as alternative approach.  This was discussed with him.  Next  If no relief with injections and conservative management, then may consider referral to spine surgery for further evaluation.    [place] Prescription Drug Monitoring Program report was reviewed and was appropriate     Complete risks and benefits including bleeding, infection, tissue reaction, nerve injury and allergic reaction were discussed. The approach was demonstrated using models and literature was provided. Verbal and written consent was obtained.     Discharge instructions were provided. I personally saw and examined the patient and I agree with the above discussed plan of care.    History of Present Illness:    Zelalem Pepe is a 74 y.o. male who presents to [facility] for initial evaluation  of the above stated pain complaints. The patient has a past medical and chronic pain history as outlined in the assessment section. He was referred by Antolin Patton, DO  [address]  [address]  [place] [address] .    Report back and buttock pain radiating into the hips and stopping at the knees.  This is a chronic issue for over 7 years.  Moderate to severe over the past month.  8 out of 10.  Nearly constant.  Worse in the morning.  Sharp, throbbing nature.    The pain is increased with standing, bending, walking.  Decreased with sitting or lying down    Reports decreasing walking tolerance.  When he walks he gets increased pain in the back and buttocks and legs along with weakness in lower extremities.  Sitting and flexion improve his symptoms.    Review of Systems:    Review of Systems   Constitutional:  Positive for unexpected weight change.   HENT:  Positive for hearing loss.    Musculoskeletal:  Positive for arthralgias.   Psychiatric/Behavioral:  The patient is nervous/anxious.            Past Medical History:   Diagnosis Date    Allergic 02.2020    OTC care    Anxiety 08.1978    on going care    Back injury     Chronic back pain     Depression 09/16/2016    Diabetes mellitus (HCC)     Disease of thyroid gland     Kidney stone 04.1990    passed it    Otitis media 03.1960    treated    Pulmonary embolism (HCC)     SOB (shortness of breath)     Thyroid disease        Past Surgical History:   Procedure Laterality Date    KNEE SURGERY      TONSILLECTOMY         Family History   Problem Relation Age of Onset    Hypothyroidism Mother     Mental illness Mother         manic depression    Stroke Mother     Depression Mother         manic depression    Bipolar disorder Mother         complete denial    Hypothyroidism Sister     Cancer Sister     Cancer Father         prostate and bone marrow    Cancer Maternal Grandfather         pancreatic    Thyroid disease Sister     Autoimmune disease Sister      Cancer Sister         uterine    Cancer Maternal Uncle         Pancreatic       Social History     Occupational History    Occupation: employed     Comment: employed    Tobacco Use    Smoking status: Former     Current packs/day: 0.00     Average packs/day: 0.5 packs/day for 32.4 years (16.2 ttl pk-yrs)     Types: Cigarettes     Start date: 1968     Quit date: 2001     Years since quittin.4    Smokeless tobacco: Never    Tobacco comments:     never a heave smoker   Vaping Use    Vaping status: Never Used   Substance and Sexual Activity    Alcohol use: Yes     Alcohol/week: 1.0 standard drink of alcohol     Types: 1 Cans of beer per week     Comment: occasional. 1 drink/wk     Drug use: Not Currently     Types: Marijuana     Comment: have not smoked since PE    Sexual activity: Not Currently     Partners: Female     Birth control/protection: Spermicide, Other     Comment: the Pill         Current Outpatient Medications:     ALPRAZolam (XANAX) 2 MG tablet, Take 1 tablet (2 mg total) by mouth in the morning, Disp: 90 tablet, Rfl: 0    apixaban (Eliquis) 5 mg, Take 1 tablet (5 mg total) by mouth 2 (two) times a day, Disp: 180 tablet, Rfl: 1    citalopram (CeleXA) 40 mg tablet, Take 1 tablet (40 mg total) by mouth daily, Disp: 90 tablet, Rfl: 1    gabapentin (Neurontin) 100 mg capsule, Take 1 capsule (100 mg total) by mouth 2 (two) times a day Take with 300mg for total dose of 400mg BID., Disp: 180 capsule, Rfl: 1    gabapentin (NEURONTIN) 300 mg capsule, Take 1 capsule (300 mg total) by mouth 2 (two) times a day, Disp: 180 capsule, Rfl: 1    levothyroxine 100 mcg tablet, Take 1 tablet (100 mcg total) by mouth daily in the early morning, Disp: 90 tablet, Rfl: 1    metFORMIN (GLUCOPHAGE) 500 mg tablet, Take 1 tablet (500 mg total) by mouth daily, Disp: 90 tablet, Rfl: 1    naloxone (NARCAN) 4 mg/0.1 mL nasal spray, Administer 1 spray into a nostril. If no response after 2-3 minutes, give another dose in the  "other nostril using a new spray., Disp: 1 each, Rfl: 1    neomycin-polymyxin-hydrocortisone (CORTISPORIN) otic solution, Administer 4 drops into the left ear every 6 (six) hours for 7 days, Disp: 10 mL, Rfl: 0    No Known Allergies    Physical Exam:    /83   Pulse 69   Ht 6' 1.5\" (1.867 m)   Wt 131 kg (289 lb 3.2 oz)   BMI 37.64 kg/m²     Constitutional: normal, well developed, well nourished, alert, in no distress and non-toxic and no overt pain behavior.  Eyes: anicteric  HEENT: grossly intact  Neck: supple, symmetric, trachea midline and no masses   Pulmonary:even and unlabored  Cardiovascular:No edema or pitting edema present  Skin:Normal without rashes or lesions and well hydrated  Psychiatric:Mood and affect appropriate  Neurologic:Cranial Nerves II-XII grossly intact  Musculoskeletal:normal    Lumbar Spine Exam    Appearance:  Normal lordosis  Palpation/Tenderness:  no tenderness or spasm  Sensory:  no sensory deficits noted  Range of Motion:  Full range of motion with no pain or limitations in flexion, extension, lateral flexion and rotation  Motor Strength:  Left hip flexion:  5/5  Left hip extension:  5/5  Right hip flexion:  5/5  Right hip extension:  5/5  Left knee flexion:  5/5  Left knee extension:  5/5  Right knee flexion:  5/5  Right knee extension:  5/5  Left foot dorsiflexion:  5/5  Left foot plantar flexion:  5/5  Right foot dorsiflexion:  5/5  Right foot plantar flexion:  5/5  Reflexes:  Left Patellar:  2+   Right Patellar:  2+   Left Achilles:  2+   Right Achilles:  2+   Special Tests:  Left Straight Leg Test:  negative  Right Straight Leg Test:  negative        Imaging    MRI LUMBAR SPINE WITHOUT CONTRAST  1/15/23     INDICATION: M54.42: Lumbago with sciatica, left side  M54.41: Lumbago with sciatica, right side  M25.551: Pain in right hip  M47.816: Spondylosis without myelopathy or radiculopathy, lumbar region  M43.16: Spondylolisthesis, lumbar region  M51.36: Other intervertebral " disc degeneration, lumbar region  M16.0: Bilateral primary osteoarthritis of hip.     COMPARISON:  X-ray lumbar spine 12/17/2022     TECHNIQUE:  Multiplanar, multisequence imaging of the lumbar spine was performed. .          IMAGE QUALITY:  Diagnostic     FINDINGS:     VERTEBRAL BODIES:  There are 5 lumbar type vertebral bodies.  Grade 1 anterolisthesis at L4-5.  Mild retrolisthesis at L1-2.     No abnormal bone marrow signal or suspicious discrete lesion. .     SACRUM:  Unremarkable.  No evidence of insufficiency or stress fracture.     DISTAL CORD AND CONUS:  Normal size and signal within the distal cord and conus.     PARASPINAL SOFT TISSUES:  Paraspinal soft tissues are unremarkable.     LOWER THORACIC DISC SPACES:  Mild noncompressive lower thoracic degenerative change.     LUMBAR DISC SPACES:     L1-L2:  Disc bulge.  Moderate bilateral facet arthropathy.  Mild canal stenosis.  Mild bilateral foraminal narrowing.     L2-L3:  Minor disc bulge.  Moderate facet arthropathy.  No significant canal or foraminal stenosis.     L3-L4:  Minor disc bulge.  Moderate bilateral facet arthropathy.  No significant canal stenosis.  Mild bilateral foraminal narrowing.     L4-L5:  Grade 1 anterolisthesis.  Severe bilateral facet arthropathy and ligamentum flavum thickening.  Severe canal stenosis.  Mild bilateral foraminal narrowing.     L5-S1:  Mild disc bulge contacting bilateral exiting L5 nerve roots (series 6 image 23).  Severe bilateral facet arthropathy.  No significant canal stenosis.  Mild bilateral foraminal narrowing.     OTHER FINDINGS:  There is 2.7 cm suprarenal abdominal aortic ectasia, stable from CT 1/28/2021.     Left renal benign-appearing cyst.     IMPRESSION:     1.  Degenerative change as detailed worse at level L4-5 with severe canal stenosis.     2.  Multilevel mild foraminal narrowing.    LUMBAR SPINE  12/17/22     INDICATION:   M54.42: Lumbago with sciatica, left side  M54.41: Lumbago with sciatica,  right side.     COMPARISON:  None     VIEWS:  XR SPINE LUMBAR 2 OR 3 VIEWS INJURY  Images: 2     FINDINGS:     There are 5 non rib bearing lumbar vertebral bodies.      There is no evidence of acute fracture or destructive osseous lesion.     Grade 1 degenerative anterolisthesis of L4 on L5.  Alignment otherwise normal.     There are multilevel degenerative changes, most pronounced in the lower lumbar facet joints and upper lumbar discs although the L5-4-5 disc is also moderately narrowed.     The pedicles appear intact. SI joints appear normal.     Soft tissues are unremarkable.     IMPRESSION:     No acute osseous abnormality.       Degenerative changes as described.     Grade 1 degenerative anterolisthesis of L4 on L5.  FL spine and pain procedure    (Results Pending)       Orders Placed This Encounter   Procedures    FL spine and pain procedure

## 2024-07-23 ENCOUNTER — PATIENT MESSAGE (OUTPATIENT)
Dept: RADIOLOGY | Facility: CLINIC | Age: 74
End: 2024-07-23

## 2024-07-23 ENCOUNTER — CONSULT (OUTPATIENT)
Dept: PAIN MEDICINE | Facility: CLINIC | Age: 74
End: 2024-07-23
Payer: COMMERCIAL

## 2024-07-23 VITALS
BODY MASS INDEX: 37.12 KG/M2 | HEIGHT: 74 IN | DIASTOLIC BLOOD PRESSURE: 83 MMHG | SYSTOLIC BLOOD PRESSURE: 130 MMHG | HEART RATE: 69 BPM | WEIGHT: 289.2 LBS

## 2024-07-23 DIAGNOSIS — M48.062 SPINAL STENOSIS OF LUMBAR REGION WITH NEUROGENIC CLAUDICATION: ICD-10-CM

## 2024-07-23 PROCEDURE — 99204 OFFICE O/P NEW MOD 45 MIN: CPT | Performed by: STUDENT IN AN ORGANIZED HEALTH CARE EDUCATION/TRAINING PROGRAM

## 2024-07-23 NOTE — PATIENT COMMUNICATION
Pt is scheduled for LESI with Dr Gillette on 8/22/24    Pt takes Eliquis, prescribed by his pcp, REMY Miranda.  Pt is aware he will need to hold this med in advance of his injection and that he will receive a call closer to his procedure date with more details.    Pt is not diabetic    Pt was given instructions in office and via myc message    Have you completed PT/HEP/Chiro in the past 6 months for dedicated area? no  If yes, how long did you complete?  What was the frequency?  Did it provide relief?  If no, reason therapy was not completed?

## 2024-07-23 NOTE — PATIENT INSTRUCTIONS
"Patient Education     Epidural injection   The Basics   Written by the doctors and editors at [facility]   What is an epidural injection? -- An epidural injection can be used to treat a condition called \"radiculopathy.\" This is the medical term for the pain, weakness, numbness, or tingling that happens when nerves coming from the spinal cord get pinched or damaged.  The doctor injects medicines into the space outside the covering of the spinal cord (figure 1). This is similar to an \"epidural\" that is used for pain relief during labor and childbirth.  Epidural injections can be given into different parts of your back:   Cervical epidural injection - Used to help with pain in the head or arms.   Thoracic epidural injection - Used to help with pain in the upper or middle back.   Lumbar epidural injection - Used to help with pain in the lower back or legs.  How do I prepare for an epidural injection? -- The doctor or nurse will tell you if you need to do anything special to prepare. Before your procedure, your doctor will do an exam. They might send you to get tests, such as:   X-ray, ultrasound, or other imaging tests - Imaging tests create pictures of the inside of the body.  Your doctor will also ask you about your \"health history.\" This involves asking you questions about any health problems you have or had in the past, past surgeries, and any medicines you take. Tell them about:   Any medicines you are taking - This includes any prescription or \"over-the-counter\" medicines you use, plus any herbal supplements you take. It helps to write down and bring a list of any medicines you take, or bring a bag with all of your medicines with you.   Any allergies you have   Any bleeding problems you have - Certain medicines, including some herbs and supplements, can increase the risk of bleeding. Some health conditions also increase this risk.  You will also get information about:   Eating and drinking before your procedure - In " "some cases, you might need to \"fast\" before surgery. This means not eating or drinking anything for a period of time. In other cases, you might be allowed to have liquids until a short time before the procedure. Whether you need to fast, and for how long, depends on the procedure you are having.   What help you will need when you go home - For example, you might need to have someone else bring you home or stay with you for some time while you recover.  Ask the doctor or nurse if you have questions or if there is anything you do not understand.  What happens during an epidural injection? -- When it is time for the procedure:   You might get an \"IV,\" which is a thin tube that goes into a vein. This can be used to give you fluids and medicines.   You will get anesthesia medicines to numb the area where the doctor will give the injection. This is to make sure that you do not feel pain during the procedure. You might also get medicines to make you relax and feel sleepy, called \"sedatives.\"   The doctors and nurses will monitor your breathing, blood pressure, and heart rate during the procedure.   The doctor might use a continuous X-ray called \"fluoroscopy.\" This is to help make sure that the medicines are injected into the right place. The doctor might also inject a dye to see where to give the medicine.   The doctor will place a needle through your skin and inject the medicine into a space near your spine. Then, they will remove the needle and cover the area with a clean bandage.   The procedure takes 15 to 30 minutes.  What happens after an epidural injection? -- After your procedure, the staff will watch you closely for a short time. It might take a few days before you feel the effects of the epidural injection.  Before you go home, make sure that you know what problems to look out for and when to call the doctor. Make sure that you understand your doctor's or nurse's instructions. Ask questions about anything you do " "not understand.  For the rest of the day after your procedure:   Try to rest. Limit activities like exercise or driving.   The doctor might recommend an over-the-counter pain medicine. These include acetaminophen (sample brand name: Tylenol), ibuprofen (sample brand names: Advil, Motrin), and naproxen (sample brand name: Aleve).   Ice can help with pain and swelling. Put a cold gel pack, bag of ice, or bag of frozen vegetables on the injection site area every 1 to 2 hours, for 15 minutes each time. Put a thin towel between the ice (or other cold object) and the skin.  What are the risks of an epidural injection? -- Your doctor will talk to you about all of the possible risks, and answer your questions. Possible risks include:   Bleeding   Infection   Headache   Nerve injury  When should I call the doctor? -- Call for emergency help right away (in the [place] and [place], call 9-1-1) if:   You can't move your arms or legs.  Call for advice if:   You have a fever of 100.4°F (38°C) or higher, or chills.   You have redness or swelling around the injection site.   You have a headache.   Your arms or legs are numb, weak, or tingly.  All topics are updated as new evidence becomes available and our peer review process is complete.  This topic retrieved from [employer] on: May 15, 2024.  Topic 976965 Version 1.0  Release: 32.4.3 - C32.134  © 2024 UpToDate, Inc. and/or its affiliates. All rights reserved.  figure 1: Epidural injection     Duringan epidural injection, the doctor inserts a needle between 2 of the bones thatmake up the spine. Then, they inject medicines into the area around the spinalcord. This is an illustration of a \"lumbar\" epidural injection, which is given into the low back. The doctor can place the needle in other areas to treat other types of pain.  Graphic 624216 Version 1.0  Consumer Information Use and Disclaimer   Disclaimer: This generalized information is a limited summary of diagnosis, treatment, and/or " medication information. It is not meant to be comprehensive and should be used as a tool to help the user understand and/or assess potential diagnostic and treatment options. It does NOT include all information about conditions, treatments, medications, side effects, or risks that may apply to a specific patient. It is not intended to be medical advice or a substitute for the medical advice, diagnosis, or treatment of a health care provider based on the health care provider's examination and assessment of a patient's specific and unique circumstances. Patients must speak with a health care provider for complete information about their health, medical questions, and treatment options, including any risks or benefits regarding use of medications. This information does not endorse any treatments or medications as safe, effective, or approved for treating a specific patient. UpToDate, Inc. and its affiliates disclaim any warranty or liability relating to this information or the use thereof.The use of this information is governed by the Terms of Use, available at https://www.[employer].com/en/know/clinical-effectiveness-terms. 2024© UpToDate, Inc. and its affiliates and/or licensors. All rights reserved.  Copyright   © 2024 UpToDate, Inc. and/or its affiliates. All rights reserved.

## 2024-07-29 ENCOUNTER — OFFICE VISIT (OUTPATIENT)
Age: 74
End: 2024-07-29
Payer: COMMERCIAL

## 2024-07-29 VITALS
HEART RATE: 78 BPM | BODY MASS INDEX: 37.48 KG/M2 | OXYGEN SATURATION: 94 % | DIASTOLIC BLOOD PRESSURE: 80 MMHG | TEMPERATURE: 97.8 F | RESPIRATION RATE: 18 BRPM | SYSTOLIC BLOOD PRESSURE: 112 MMHG | WEIGHT: 288 LBS

## 2024-07-29 DIAGNOSIS — H60.391 OTHER INFECTIVE ACUTE OTITIS EXTERNA OF RIGHT EAR: Primary | ICD-10-CM

## 2024-07-29 DIAGNOSIS — H66.91 RIGHT OTITIS MEDIA, UNSPECIFIED OTITIS MEDIA TYPE: ICD-10-CM

## 2024-07-29 DIAGNOSIS — H61.22 EXCESSIVE CERUMEN IN EAR CANAL, LEFT: ICD-10-CM

## 2024-07-29 PROCEDURE — 99213 OFFICE O/P EST LOW 20 MIN: CPT | Performed by: NURSE PRACTITIONER

## 2024-07-29 RX ORDER — AMOXICILLIN AND CLAVULANATE POTASSIUM 875; 125 MG/1; MG/1
1 TABLET, FILM COATED ORAL EVERY 12 HOURS SCHEDULED
Qty: 14 TABLET | Refills: 0 | Status: SHIPPED | OUTPATIENT
Start: 2024-07-29 | End: 2024-08-02

## 2024-07-29 NOTE — PROGRESS NOTES
Assessment/Plan    Other infective acute otitis externa of right ear [H60.391]  1. Other infective acute otitis externa of right ear  neomycin-polymyxin-hydrocortisone (CORTISPORIN) otic solution      2. Right otitis media, unspecified otitis media type  amoxicillin-clavulanate (AUGMENTIN) 875-125 mg per tablet      3. Excessive cerumen in ear canal, left          Start augmentin today BID for 7 days  Start ear drops today, apply to right ear as discussed   Trial debrox to left ear for partial cerumen impaction   F/u with PCP within 1-2 weeks  F/u with ENT if this becomes recurrent   Proceed to ER should your symptoms worsen        Patient ID: Zelalem Pepe is a 74 y.o. male.      Reason For Visit / Chief Complaint  Chief Complaint   Patient presents with    Earache     R earache, blockage X 2 weeks. Has chronic issues with ears         75 y/o male presents for hearing loss and ear pain in the right ear for 2 weeks. Patient has a hx of cerumen impaction of the right ear. + for chills and fevers last night. Patient tried to use hydrogen peroxide in his ear and took some left over amoxicillin due to worsening of his symptoms.    Earache   There is pain in the right ear. This is a new problem. The current episode started 1 to 4 weeks ago. The problem occurs constantly. The problem has been gradually worsening. The fever has been present for Less than 1 day. The pain is at a severity of 10/10. The pain is severe. Associated symptoms include ear discharge and hearing loss. Pertinent negatives include no abdominal pain, coughing, diarrhea, headaches, neck pain, rash, rhinorrhea, sore throat or vomiting. He has tried antibiotics (hydrogen peroxide) for the symptoms. The treatment provided no relief. His past medical history is significant for a chronic ear infection.       Past Medical History:   Diagnosis Date    Allergic 02.2020    OTC care    Anxiety 08.1978    on going care    Back injury     Chronic back pain      Depression 09/16/2016    Diabetes mellitus (HCC)     Disease of thyroid gland     Kidney stone 04.1990    passed it    Otitis media 03.1960    treated    Pulmonary embolism (HCC)     SOB (shortness of breath)     Thyroid disease        Past Surgical History:   Procedure Laterality Date    KNEE SURGERY      TONSILLECTOMY         Family History   Problem Relation Age of Onset    Hypothyroidism Mother     Mental illness Mother         manic depression    Stroke Mother     Depression Mother         manic depression    Bipolar disorder Mother         complete denial    Hypothyroidism Sister     Cancer Sister     Cancer Father         prostate and bone marrow    Cancer Maternal Grandfather         pancreatic    Thyroid disease Sister     Autoimmune disease Sister     Cancer Sister         uterine    Cancer Maternal Uncle         Pancreatic       Review of Systems   Constitutional:  Positive for chills and fever.   HENT:  Positive for ear discharge, ear pain and hearing loss. Negative for rhinorrhea and sore throat.    Eyes: Negative.    Respiratory: Negative.  Negative for cough.    Cardiovascular: Negative.    Gastrointestinal: Negative.  Negative for abdominal pain, diarrhea and vomiting.   Endocrine: Negative.    Genitourinary: Negative.    Musculoskeletal: Negative.  Negative for neck pain.   Skin:  Negative for rash.   Allergic/Immunologic: Negative.    Neurological: Negative.  Negative for headaches.   Hematological: Negative.    Psychiatric/Behavioral: Negative.         Objective:    /80 (BP Location: Left arm, Patient Position: Sitting, Cuff Size: Adult)   Pulse 78   Temp 97.8 °F (36.6 °C) (Tympanic)   Resp 18   Wt 131 kg (288 lb)   SpO2 94%   BMI 37.48 kg/m²     Physical Exam  Constitutional:       Appearance: Normal appearance.   HENT:      Head: Normocephalic and atraumatic.      Right Ear: Decreased hearing noted. Drainage, swelling and tenderness present.      Left Ear: Hearing, tympanic  membrane and external ear normal. There is impacted cerumen (partial).      Ears:      Comments: Unable to visualize right TM due to swelling and narrowing of ear canal      Nose: Nose normal.      Mouth/Throat:      Mouth: Mucous membranes are moist.      Pharynx: Oropharynx is clear.   Eyes:      Conjunctiva/sclera: Conjunctivae normal.      Pupils: Pupils are equal, round, and reactive to light.   Cardiovascular:      Rate and Rhythm: Normal rate and regular rhythm.      Pulses: Normal pulses.      Heart sounds: Normal heart sounds.   Pulmonary:      Effort: Pulmonary effort is normal.      Breath sounds: Normal breath sounds.   Abdominal:      Palpations: Abdomen is soft.   Musculoskeletal:         General: Normal range of motion.      Cervical back: Normal range of motion.   Lymphadenopathy:      Cervical: No cervical adenopathy.   Skin:     General: Skin is warm and dry.      Capillary Refill: Capillary refill takes less than 2 seconds.   Neurological:      General: No focal deficit present.      Mental Status: He is alert and oriented to person, place, and time.   Psychiatric:         Behavior: Behavior normal.         Thought Content: Thought content normal.

## 2024-07-29 NOTE — PATIENT INSTRUCTIONS
Start augmentin today BID for 7 days  Start ear drops today, apply to right ear as discussed   Trial debrox to left ear for partial cerumen impaction   F/u with PCP within 1-2 weeks  F/u with ENT if this becomes recurrent   Proceed to ER should your symptoms worsen

## 2024-08-01 ENCOUNTER — TELEPHONE (OUTPATIENT)
Dept: RADIOLOGY | Facility: CLINIC | Age: 74
End: 2024-08-01

## 2024-08-01 NOTE — TELEPHONE ENCOUNTER
REMY Miranda,    This mutual patient is to undergo a Lumbar Epidural Steroid Injection with Dr Gillette  For this procedure he will need to hold his Eliquis x 3 days and resume post-op day of.    Do you give permission for this hold?  If granted, permission will be valid for only 60 days    Please reroute back to [facility]

## 2024-08-02 ENCOUNTER — OFFICE VISIT (OUTPATIENT)
Age: 74
End: 2024-08-02
Payer: COMMERCIAL

## 2024-08-02 VITALS
BODY MASS INDEX: 37.48 KG/M2 | SYSTOLIC BLOOD PRESSURE: 137 MMHG | TEMPERATURE: 97.2 F | WEIGHT: 288 LBS | RESPIRATION RATE: 18 BRPM | OXYGEN SATURATION: 96 % | HEART RATE: 60 BPM | DIASTOLIC BLOOD PRESSURE: 84 MMHG

## 2024-08-02 DIAGNOSIS — H92.01 RIGHT EAR PAIN: ICD-10-CM

## 2024-08-02 DIAGNOSIS — H60.391 OTHER INFECTIVE ACUTE OTITIS EXTERNA OF RIGHT EAR: Primary | ICD-10-CM

## 2024-08-02 DIAGNOSIS — H66.91 ACUTE OTITIS MEDIA, RIGHT: ICD-10-CM

## 2024-08-02 PROCEDURE — 99214 OFFICE O/P EST MOD 30 MIN: CPT | Performed by: PHYSICIAN ASSISTANT

## 2024-08-02 PROCEDURE — 69209 REMOVE IMPACTED EAR WAX UNI: CPT | Performed by: PHYSICIAN ASSISTANT

## 2024-08-02 RX ORDER — METHYLPREDNISOLONE 4 MG/1
TABLET ORAL
Qty: 1 EACH | Refills: 0 | Status: SHIPPED | OUTPATIENT
Start: 2024-08-02

## 2024-08-02 RX ORDER — CEFDINIR 300 MG/1
300 CAPSULE ORAL EVERY 12 HOURS SCHEDULED
Qty: 10 CAPSULE | Refills: 0 | Status: SHIPPED | OUTPATIENT
Start: 2024-08-02 | End: 2024-08-07

## 2024-08-02 RX ORDER — CIPROFLOXACIN AND DEXAMETHASONE 3; 1 MG/ML; MG/ML
4 SUSPENSION/ DROPS AURICULAR (OTIC) 2 TIMES DAILY
Qty: 7.5 ML | Refills: 0 | Status: SHIPPED | OUTPATIENT
Start: 2024-08-02 | End: 2024-08-09

## 2024-08-02 NOTE — PROGRESS NOTES
[facility]        NAME: Zelalem Pepe is a 74 y.o. male  : 1950    MRN: [id number]  DATE: 2024  TIME: 7:49 PM    Assessment and Plan   Other infective acute otitis externa of right ear [H60.391]  1. Other infective acute otitis externa of right ear  ciprofloxacin-dexamethasone (CIPRODEX) otic suspension    Ear cerumen removal      2. Right ear pain  methylPREDNISolone 4 MG tablet therapy pack    Ear cerumen removal      3. Acute otitis media, right  cefdinir (OMNICEF) 300 mg capsule    Ear cerumen removal          Ears irrigated today moderate amount of cerumen was removed.  He will follow-up PCP if no improvements.  He was switched to Ciprodex drops as well as Omnicef due to this incomplete resolution of symptoms    The patient and/or parent/legal guardian verbalized understanding of exam findings and   Treatment plan. We engaged in the shared decision-making process and treatment options were   discussed at length with the patient.  All questions, concerns and complaints were answered and   addressed to the patient's' and/or parent/legal guardians's satisfaction.    Patient Instructions   There are no Patient Instructions on file for this visit.    Follow up with PCP in 3-5 days.  Proceed to  ER if symptoms worsen.    If tests are performed, our office will contact you with results only if   changes need to made to the care plan discussed with you at the visit.   [facility]     Chief Complaint     Chief Complaint   Patient presents with   • Earache     Pt states he has a right sided earache. Pt was seen on Monday and the ear was swollen shut so he was told to return today          History of Present Illness       HPI  Pt reports the ear drops from Monday he couldn't get them in, took prednisone he had at home which gave him some relief. Today pain is about 3/10 vs Monday 10/10. Extends from below to slightly above the ear. No fever or  chills. Decreased hearing in the R ear. No other changes since  Monday, no discharge.     Review of Systems   Review of Systems  All other related systems reviewed and are negative except as noted in HPI    Current Medications       Current Outpatient Medications:   •  ALPRAZolam (XANAX) 2 MG tablet, Take 1 tablet (2 mg total) by mouth in the morning, Disp: 90 tablet, Rfl: 0  •  apixaban (Eliquis) 5 mg, Take 1 tablet (5 mg total) by mouth 2 (two) times a day, Disp: 180 tablet, Rfl: 1  •  cefdinir (OMNICEF) 300 mg capsule, Take 1 capsule (300 mg total) by mouth every 12 (twelve) hours for 5 days, Disp: 10 capsule, Rfl: 0  •  ciprofloxacin-dexamethasone (CIPRODEX) otic suspension, Administer 4 drops to the right ear 2 (two) times a day for 7 days, Disp: 7.5 mL, Rfl: 0  •  citalopram (CeleXA) 40 mg tablet, Take 1 tablet (40 mg total) by mouth daily, Disp: 90 tablet, Rfl: 1  •  gabapentin (Neurontin) 100 mg capsule, Take 1 capsule (100 mg total) by mouth 2 (two) times a day Take with 300mg for total dose of 400mg BID., Disp: 180 capsule, Rfl: 1  •  gabapentin (NEURONTIN) 300 mg capsule, Take 1 capsule (300 mg total) by mouth 2 (two) times a day, Disp: 180 capsule, Rfl: 1  •  levothyroxine 100 mcg tablet, Take 1 tablet (100 mcg total) by mouth daily in the early morning, Disp: 90 tablet, Rfl: 1  •  metFORMIN (GLUCOPHAGE) 500 mg tablet, Take 1 tablet (500 mg total) by mouth daily, Disp: 90 tablet, Rfl: 1  •  methylPREDNISolone 4 MG tablet therapy pack, Use as directed on package, Disp: 1 each, Rfl: 0  •  naloxone (NARCAN) 4 mg/0.1 mL nasal spray, Administer 1 spray into a nostril. If no response after 2-3 minutes, give another dose in the other nostril using a new spray., Disp: 1 each, Rfl: 1    Current Allergies     Allergies as of 08/02/2024   • (No Known Allergies)            The following portions of the patient's history were reviewed and updated as appropriate: allergies, current medications, past family history,  past medical history, past social history, past surgical history and problem list.     Past Medical History:   Diagnosis Date   • Allergic 02.2020    OTC care   • Anxiety 08.1978    on going care   • Back injury    • Chronic back pain    • Depression 09/16/2016   • Diabetes mellitus (HCC)    • Disease of thyroid gland    • Kidney stone 04.1990    passed it   • Otitis media 03.1960    treated   • Pulmonary embolism (HCC)    • SOB (shortness of breath)    • Thyroid disease        Past Surgical History:   Procedure Laterality Date   • KNEE SURGERY     • TONSILLECTOMY         Family History   Problem Relation Age of Onset   • Hypothyroidism Mother    • Mental illness Mother         manic depression   • Stroke Mother    • Depression Mother         manic depression   • Bipolar disorder Mother         complete denial   • Hypothyroidism Sister    • Cancer Sister    • Cancer Father         prostate and bone marrow   • Cancer Maternal Grandfather         pancreatic   • Thyroid disease Sister    • Autoimmune disease Sister    • Cancer Sister         uterine   • Cancer Maternal Uncle         Pancreatic         Medications have been verified.        Objective   /84   Pulse 60   Temp (!) 97.2 °F (36.2 °C)   Resp 18   Wt 131 kg (288 lb)   SpO2 96%   BMI 37.48 kg/m²   No LMP for male patient.       Physical Exam     Physical Exam  Constitutional:       General: He is not in acute distress.     Appearance: He is well-developed.   HENT:      Head: Normocephalic and atraumatic.      Right Ear: There is impacted cerumen.      Ears:      Comments: Right TM is intact does appear erythematous there is still some swelling in the ear canal as well as some discharge present this was improved after irrigation  Eyes:      General: No scleral icterus.     Conjunctiva/sclera: Conjunctivae normal.   Pulmonary:      Effort: Pulmonary effort is normal. No respiratory distress.      Breath sounds: No stridor.   Musculoskeletal:       "Cervical back: Normal range of motion and neck supple.   Skin:     General: Skin is warm and dry.   Neurological:      Mental Status: He is alert and oriented to person, place, and time.   Psychiatric:         Behavior: Behavior normal.         Ortho Exam        Ear cerumen removal    Date/Time: 8/2/2024 11:00 AM    Performed by: Ladarius Shannon PA-C  Authorized by: Ladarius Shannon PA-C  Universal Protocol:  Consent: Verbal consent obtained.  Risks and benefits: risks, benefits and alternatives were discussed  Consent given by: patient  Patient identity confirmed: verbally with patient    Patient location:  Clinic  Procedure details:     Location:  R ear    Procedure type: irrigation only      Approach:  Natural orifice  Post-procedure details:     Complication:  None    Hearing quality:  Improved    Patient tolerance of procedure:  Tolerated well, no immediate complications            Note: Portions of this record may have been created with voice recognition software. Occasional wrong word or \"sound a like\" substitutions may have occurred due to the inherent limitations of voice recognition software. Please read the chart carefully and recognize, using context, where substitutions have occurred.*      "

## 2024-08-02 NOTE — TELEPHONE ENCOUNTER
S/w pt and advised last dose of eliquis 8/18 and then may resume post procedure 8/22.  Pt asking for update on auth for procedure

## 2024-08-06 NOTE — TELEPHONE ENCOUNTER
Dr Gillette,    Please see staff message sent regarding pt -- injection would require peer to peer.    Please advise

## 2024-08-22 ENCOUNTER — HOSPITAL ENCOUNTER (OUTPATIENT)
Dept: RADIOLOGY | Facility: CLINIC | Age: 74
End: 2024-08-22
Payer: COMMERCIAL

## 2024-08-22 VITALS
OXYGEN SATURATION: 96 % | RESPIRATION RATE: 18 BRPM | DIASTOLIC BLOOD PRESSURE: 92 MMHG | HEART RATE: 59 BPM | SYSTOLIC BLOOD PRESSURE: 139 MMHG

## 2024-08-22 DIAGNOSIS — M48.062 SPINAL STENOSIS OF LUMBAR REGION WITH NEUROGENIC CLAUDICATION: ICD-10-CM

## 2024-08-22 PROCEDURE — 62323 NJX INTERLAMINAR LMBR/SAC: CPT | Performed by: STUDENT IN AN ORGANIZED HEALTH CARE EDUCATION/TRAINING PROGRAM

## 2024-08-22 RX ORDER — METHYLPREDNISOLONE ACETATE 80 MG/ML
80 INJECTION, SUSPENSION INTRA-ARTICULAR; INTRALESIONAL; INTRAMUSCULAR; PARENTERAL; SOFT TISSUE ONCE
Status: COMPLETED | OUTPATIENT
Start: 2024-08-22 | End: 2024-08-22

## 2024-08-22 RX ADMIN — METHYLPREDNISOLONE ACETATE 80 MG: 80 INJECTION, SUSPENSION INTRA-ARTICULAR; INTRALESIONAL; INTRAMUSCULAR; PARENTERAL; SOFT TISSUE at 09:53

## 2024-08-22 RX ADMIN — IOHEXOL 1 ML: 300 INJECTION, SOLUTION INTRAVENOUS at 09:53

## 2024-08-22 NOTE — H&P
History of Present Illness: The patient is a 74 y.o. male who presents with complaints of back and buttock pain    Past Medical History:   Diagnosis Date    Allergic 02.2020    OTC care    Anxiety 08.1978    on going care    Back injury     Chronic back pain     Depression 09/16/2016    Diabetes mellitus (HCC)     Disease of thyroid gland     Kidney stone 04.1990    passed it    Otitis media 03.1960    treated    Pulmonary embolism (HCC)     SOB (shortness of breath)     Thyroid disease        Past Surgical History:   Procedure Laterality Date    KNEE SURGERY      TONSILLECTOMY           Current Outpatient Medications:     ALPRAZolam (XANAX) 2 MG tablet, Take 1 tablet (2 mg total) by mouth in the morning, Disp: 90 tablet, Rfl: 0    apixaban (Eliquis) 5 mg, Take 1 tablet (5 mg total) by mouth 2 (two) times a day, Disp: 180 tablet, Rfl: 1    ciprofloxacin-dexamethasone (CIPRODEX) otic suspension, Administer 4 drops to the right ear 2 (two) times a day for 7 days, Disp: 7.5 mL, Rfl: 0    citalopram (CeleXA) 40 mg tablet, Take 1 tablet (40 mg total) by mouth daily, Disp: 90 tablet, Rfl: 1    gabapentin (Neurontin) 100 mg capsule, Take 1 capsule (100 mg total) by mouth 2 (two) times a day Take with 300mg for total dose of 400mg BID., Disp: 180 capsule, Rfl: 1    gabapentin (NEURONTIN) 300 mg capsule, Take 1 capsule (300 mg total) by mouth 2 (two) times a day, Disp: 180 capsule, Rfl: 1    levothyroxine 100 mcg tablet, Take 1 tablet (100 mcg total) by mouth daily in the early morning, Disp: 90 tablet, Rfl: 1    metFORMIN (GLUCOPHAGE) 500 mg tablet, Take 1 tablet (500 mg total) by mouth daily, Disp: 90 tablet, Rfl: 1    methylPREDNISolone 4 MG tablet therapy pack, Use as directed on package, Disp: 1 each, Rfl: 0    naloxone (NARCAN) 4 mg/0.1 mL nasal spray, Administer 1 spray into a nostril. If no response after 2-3 minutes, give another dose in the other nostril using a new spray., Disp: 1 each, Rfl: 1    No Known  Allergies    Physical Exam:   Vitals:    08/22/24 0937   BP: 143/83   Pulse: (!) 54   Resp: 20   SpO2: 96%     General: Awake, Alert, Oriented x 3, Mood and affect appropriate  Respiratory: Respirations even and unlabored  Cardiovascular: Peripheral pulses intact; no edema  Musculoskeletal Exam: back non erythematous no lesions    ASA Score: 3    Patient/Chart Verification  Patient ID Verified: Verbal  ID Band Applied: No  Consents Confirmed: To be obtained in the Pre-Procedure area  H&P( within 30 days) Verified: To be obtained in the Pre-Procedure area  Interval H&P(within 24 hr) Complete (required for Outpatients and Surgery Admit only): To be obtained in the Pre-Procedure area  Allergies Reviewed: Yes  Anticoag/NSAID held?: No (eliquis x3 days)  Currently on antibiotics?: No  Pregnancy denied?: NA    Assessment:   1. Spinal stenosis of lumbar region with neurogenic claudication        Plan: L5-S1 LESI

## 2024-08-22 NOTE — DISCHARGE INSTR - LAB
Epidural Steroid Injection   WHAT YOU NEED TO KNOW:   An epidural steroid injection (ISAIAS) is a procedure to inject steroid medicine into the epidural space. The epidural space is between your spinal cord and vertebrae. Steroids reduce inflammation and fluid buildup in your spine that may be causing pain. You may be given pain medicine along with the steroids.          ACTIVITY  Do not drive or operate machinery today.  No strenuous activity today - bending, lifting, etc.  You may resume normal activites starting tomorrow - start slowly and as tolerated.  You may shower today, but no tub baths or hot tubs.  You may have numbness for several hours from the local anesthetic. Please use caution and common sense, especially with weight-bearing activities.    CARE OF THE INJECTION SITE  If you have soreness or pain, apply ice to the area today (20 minutes on/20 minutes off).  Starting tomorrow, you may use warm, moist heat or ice if needed.  You may have an increase or change in your discomfort for 36-48 hours after your treatment.  Apply ice and continue with any pain medication you have been prescribed.  Notify the [facility] if you have any of the following: redness, drainage, swelling, headache, stiff neck or fever above 100°F.    SPECIAL INSTRUCTIONS  Our office will contact you in approximately 14 days for a progress report.    MEDICATIONS  Continue to take all routine medications.  Our office may have instructed you to hold some medications.    As no general anesthesia was used in today's procedure, you should not experience any side effects related to anesthesia.     If you are diabetic, the steroids used in today's injection may temporarily increase your blood sugar levels after the first few days after your injection. Please keep a close eye on your sugars and alert the doctor who manages your diabetes if your sugars are significantly high from your baseline or you are symptomatic.     If you have a  problem specifically related to your procedure, please call our office at [phone].  Problems not related to your procedure should be directed to your primary care physician.

## 2024-09-16 ENCOUNTER — VBI (OUTPATIENT)
Dept: ADMINISTRATIVE | Facility: OTHER | Age: 74
End: 2024-09-16

## 2024-09-16 NOTE — TELEPHONE ENCOUNTER
09/16/24 12:17 PM     Chart reviewed for Diabetic Eye Exam ; nothing is submitted to the patient's insurance at this time.     Jaun Patel MA   [facility]

## 2024-10-11 ENCOUNTER — PATIENT MESSAGE (OUTPATIENT)
Dept: FAMILY MEDICINE CLINIC | Facility: CLINIC | Age: 74
End: 2024-10-11

## 2024-10-15 ENCOUNTER — RA CDI HCC (OUTPATIENT)
Dept: OTHER | Facility: HOSPITAL | Age: 74
End: 2024-10-15

## 2024-10-21 ENCOUNTER — OFFICE VISIT (OUTPATIENT)
Dept: FAMILY MEDICINE CLINIC | Facility: CLINIC | Age: 74
End: 2024-10-21
Payer: COMMERCIAL

## 2024-10-21 VITALS
HEIGHT: 73 IN | OXYGEN SATURATION: 97 % | WEIGHT: 289 LBS | RESPIRATION RATE: 18 BRPM | BODY MASS INDEX: 38.3 KG/M2 | DIASTOLIC BLOOD PRESSURE: 86 MMHG | SYSTOLIC BLOOD PRESSURE: 132 MMHG | HEART RATE: 88 BPM

## 2024-10-21 DIAGNOSIS — Z23 NEED FOR COVID-19 VACCINE: ICD-10-CM

## 2024-10-21 DIAGNOSIS — Z12.5 SCREENING FOR PROSTATE CANCER: ICD-10-CM

## 2024-10-21 DIAGNOSIS — F33.42 RECURRENT MAJOR DEPRESSIVE DISORDER, IN FULL REMISSION (HCC): Primary | ICD-10-CM

## 2024-10-21 DIAGNOSIS — E66.01 CLASS 2 SEVERE OBESITY DUE TO EXCESS CALORIES WITH SERIOUS COMORBIDITY AND BODY MASS INDEX (BMI) OF 37.0 TO 37.9 IN ADULT (HCC): ICD-10-CM

## 2024-10-21 DIAGNOSIS — F41.9 ANXIETY: ICD-10-CM

## 2024-10-21 DIAGNOSIS — Z23 NEEDS FLU SHOT: ICD-10-CM

## 2024-10-21 DIAGNOSIS — R73.03 PREDIABETES: ICD-10-CM

## 2024-10-21 DIAGNOSIS — E66.812 CLASS 2 SEVERE OBESITY DUE TO EXCESS CALORIES WITH SERIOUS COMORBIDITY AND BODY MASS INDEX (BMI) OF 37.0 TO 37.9 IN ADULT (HCC): ICD-10-CM

## 2024-10-21 DIAGNOSIS — I26.99 BILATERAL PULMONARY EMBOLISM (HCC): ICD-10-CM

## 2024-10-21 DIAGNOSIS — E03.9 ACQUIRED HYPOTHYROIDISM: ICD-10-CM

## 2024-10-21 PROCEDURE — 99214 OFFICE O/P EST MOD 30 MIN: CPT | Performed by: NURSE PRACTITIONER

## 2024-10-21 PROCEDURE — 90662 IIV NO PRSV INCREASED AG IM: CPT

## 2024-10-21 PROCEDURE — 90471 IMMUNIZATION ADMIN: CPT

## 2024-10-21 PROCEDURE — 91320 SARSCV2 VAC 30MCG TRS-SUC IM: CPT

## 2024-10-21 PROCEDURE — 90480 ADMN SARSCOV2 VAC 1/ONLY CMP: CPT

## 2024-10-21 RX ORDER — ALPRAZOLAM 2 MG/1
2 TABLET ORAL DAILY
Qty: 90 TABLET | Refills: 0 | Status: SHIPPED | OUTPATIENT
Start: 2024-10-21

## 2024-10-21 NOTE — ASSESSMENT & PLAN NOTE
New A1c ordered  Patient to work on diet and weight loss  A1C last done 11/202 was 5.9%  Orders:    Lipid Panel with Direct LDL reflex; Future    Hemoglobin A1c (w/out EAG); Future    Lipid Panel with Direct LDL reflex    Hemoglobin A1c (w/out EAG)

## 2024-10-21 NOTE — ASSESSMENT & PLAN NOTE
Patient reports he is doing well   He on celexa and xanax. Xanax refilled today. Pdmp reviewed   No red flags  Orders:    ALPRAZolam (XANAX) 2 MG tablet; Take 1 tablet (2 mg total) by mouth in the morning

## 2024-10-21 NOTE — PROGRESS NOTES
Ambulatory Visit  Name: Zelalem Pepe      : 1950      MRN: [id number]  Encounter Provider: REMY Miranda  Encounter Date: 10/21/2024   Encounter department: [facility]    Assessment & Plan  Anxiety  Patient reports he is doing well   He on celexa and xanax. Xanax refilled today. Pdmp reviewed   No red flags  Orders:    ALPRAZolam (XANAX) 2 MG tablet; Take 1 tablet (2 mg total) by mouth in the morning    Recurrent major depressive disorder, in full remission (HCC)    Patient depression is in remission. Continue on celexa  No SI       Bilateral pulmonary embolism (HCC)  Presently stable  On eliquis lifelong       Class 2 severe obesity due to excess calories with serious comorbidity and body mass index (BMI) of 37.0 to 37.9 in adult (HCC)  Recommend patient try to lose weight         Needs flu shot    Orders:    influenza vaccine, high-dose, PF 0.5 mL (Fluzone High Dose)    Need for COVID-19 vaccine    Orders:    COVID-19 Pfizer mRNA vaccine 12 yr and older (Comirnaty pre-filled syringe)    Screening for prostate cancer    Orders:    PSA, Total Screen; Future    PSA, Total Screen    Acquired hypothyroidism  Patient tsh was normal  Due for follow up labs  Currently taking levothyroxine 100mcg  Orders:    TSH, 3rd generation with Free T4 reflex; Future    Lipid Panel with Direct LDL reflex; Future    Comprehensive metabolic panel    TSH, 3rd generation with Free T4 reflex    Lipid Panel with Direct LDL reflex    Prediabetes  New A1c ordered  Patient to work on diet and weight loss  A1C last done  was 5.9%  Orders:    Lipid Panel with Direct LDL reflex; Future    Hemoglobin A1c (w/out EAG); Future    Lipid Panel with Direct LDL reflex    Hemoglobin A1c (w/out EAG)       History of Present Illness     Patient presents today for follow up  He is doing well except for his back     Patient states that he feeling some back pain again   He has injection done in July wondering  "when to have this again     He missed his lab work for may          Review of Systems   Constitutional: Negative.    Respiratory: Negative.     Cardiovascular: Negative.    Musculoskeletal:  Positive for arthralgias.   Neurological:  Negative for dizziness and headaches.   Psychiatric/Behavioral:  Negative for dysphoric mood and sleep disturbance. The patient is nervous/anxious.            Objective     /86 (BP Location: Left arm, Patient Position: Sitting, Cuff Size: Large)   Pulse 88   Resp 18   Ht 6' 1\" (1.854 m)   Wt 131 kg (289 lb)   SpO2 97%   BMI 38.13 kg/m²     Physical Exam  Vitals and nursing note reviewed.   Constitutional:       Appearance: Normal appearance. He is well-developed. He is morbidly obese. He is not ill-appearing.   HENT:      Head: Normocephalic and atraumatic.   Eyes:      Extraocular Movements: Extraocular movements intact.      Conjunctiva/sclera: Conjunctivae normal.      Pupils: Pupils are equal.   Cardiovascular:      Rate and Rhythm: Normal rate and regular rhythm.      Heart sounds: S1 normal and S2 normal. No murmur heard.  Pulmonary:      Effort: Pulmonary effort is normal. No respiratory distress.      Breath sounds: Normal breath sounds.   Neurological:      Mental Status: He is alert and oriented to person, place, and time.   Psychiatric:         Mood and Affect: Mood normal.         Thought Content: Thought content normal.         "

## 2024-10-21 NOTE — ASSESSMENT & PLAN NOTE
Patient tsh was normal  Due for follow up labs  Currently taking levothyroxine 100mcg  Orders:    TSH, 3rd generation with Free T4 reflex; Future    Lipid Panel with Direct LDL reflex; Future    Comprehensive metabolic panel    TSH, 3rd generation with Free T4 reflex    Lipid Panel with Direct LDL reflex

## 2024-11-06 DIAGNOSIS — R73.03 PREDIABETES: ICD-10-CM

## 2024-11-06 DIAGNOSIS — E03.9 ACQUIRED HYPOTHYROIDISM: Primary | ICD-10-CM

## 2024-11-06 LAB
ALBUMIN SERPL-MCNC: 4.4 G/DL (ref 3.6–5.1)
ALBUMIN/GLOB SERPL: 1.8 (CALC) (ref 1–2.5)
ALP SERPL-CCNC: 54 U/L (ref 35–144)
ALT SERPL-CCNC: 18 U/L (ref 9–46)
AST SERPL-CCNC: 27 U/L (ref 10–35)
BILIRUB SERPL-MCNC: 0.6 MG/DL (ref 0.2–1.2)
BUN SERPL-MCNC: 14 MG/DL (ref 7–25)
BUN/CREAT SERPL: 11 (CALC) (ref 6–22)
CALCIUM SERPL-MCNC: 9.4 MG/DL (ref 8.6–10.3)
CHLORIDE SERPL-SCNC: 103 MMOL/L (ref 98–110)
CHOLEST SERPL-MCNC: 169 MG/DL
CHOLEST/HDLC SERPL: 4.7 (CALC)
CO2 SERPL-SCNC: 29 MMOL/L (ref 20–32)
CREAT SERPL-MCNC: 1.3 MG/DL (ref 0.7–1.28)
GFR/BSA.PRED SERPLBLD CYS-BASED-ARV: 58 ML/MIN/1.73M2
GLOBULIN SER CALC-MCNC: 2.5 G/DL (CALC) (ref 1.9–3.7)
GLUCOSE SERPL-MCNC: 94 MG/DL (ref 65–99)
HBA1C MFR BLD: 6.2 % OF TOTAL HGB
HDLC SERPL-MCNC: 36 MG/DL
LDLC SERPL CALC-MCNC: 112 MG/DL (CALC)
NONHDLC SERPL-MCNC: 133 MG/DL (CALC)
POTASSIUM SERPL-SCNC: 4.7 MMOL/L (ref 3.5–5.3)
PROT SERPL-MCNC: 6.9 G/DL (ref 6.1–8.1)
PSA SERPL-MCNC: 2.95 NG/ML
SODIUM SERPL-SCNC: 138 MMOL/L (ref 135–146)
T4 FREE SERPL-MCNC: 0.7 NG/DL (ref 0.8–1.8)
TRIGL SERPL-MCNC: 107 MG/DL
TSH SERPL-ACNC: 8.77 MIU/L (ref 0.4–4.5)

## 2024-11-06 RX ORDER — LEVOTHYROXINE SODIUM 125 UG/1
125 TABLET ORAL
Qty: 100 TABLET | Refills: 3 | Status: SHIPPED | OUTPATIENT
Start: 2024-11-06

## 2024-11-07 ENCOUNTER — TELEPHONE (OUTPATIENT)
Age: 74
End: 2024-11-07

## 2024-11-07 DIAGNOSIS — M48.062 SPINAL STENOSIS OF LUMBAR REGION WITH NEUROGENIC CLAUDICATION: Primary | ICD-10-CM

## 2024-11-07 NOTE — TELEPHONE ENCOUNTER
Please review below  Would like to repeat  Taking Advil without much help    Injection type: L5-S1 LESI  Last injection date: 8/22  Last office visit: Consult 7/23  Where is pain located: LB to back of legs  Is the pain the same area as before: Yes  Current pain level: 7-8/10  How much % of relief did the last injection provide: 80  Duration of relief from last injection: 2 months  When did pain return:2 weeks  Is the pain effecting your ADLs: Yes    Blood thinners/NSAIDS? Yes Eliquis  Diabetes? Yes/No                   CGM? Yes/No

## 2024-11-07 NOTE — TELEPHONE ENCOUNTER
Caller: leslie Gene     Doctor: Leta     Reason for call: pt would like to be scheduled for another injection. Please Advise     Call back#: [phone]

## 2024-11-08 ENCOUNTER — PATIENT MESSAGE (OUTPATIENT)
Dept: RADIOLOGY | Facility: CLINIC | Age: 74
End: 2024-11-08

## 2024-11-08 NOTE — PATIENT COMMUNICATION
Pt is scheduled for LESI with Dr Gillette on 12/10/24     Pt takes Eliquis, prescribed by his pcp, REMY Miranda.  Pt is aware he will need to hold this med in advance of his injection and that he will receive a call closer to his procedure date with more details.     Pt is not diabetic     Pt was given instructions in office and via myc message     Have you completed PT/HEP/Chiro in the past 6 months for dedicated area? no  If yes, how long did you complete?  What was the frequency?  Did it provide relief?  If no, reason therapy was not completed?    - pt has had previous injections for pain relief     Spoke with pt -- pt reports he did PT approx 8 years ago for his lower back -- reports it did not help his pain -- does not recall where it was done, just that it was at a facility in [facility].

## 2024-11-18 ENCOUNTER — TELEPHONE (OUTPATIENT)
Dept: RADIOLOGY | Facility: CLINIC | Age: 74
End: 2024-11-18

## 2024-11-18 NOTE — TELEPHONE ENCOUNTER
[place] patient of REMY Miranda    Pt is scheduled for lumbar epidural steroid injection with Dr Gillette on 12/10/24     Pt would need to hold his Eliquis for 3 days prior to procedure and resume day of procedure.    Please advise if provider is ok with this hold.

## 2024-12-10 ENCOUNTER — HOSPITAL ENCOUNTER (OUTPATIENT)
Dept: RADIOLOGY | Facility: CLINIC | Age: 74
Discharge: HOME | End: 2024-12-10
Payer: COMMERCIAL

## 2024-12-10 VITALS
DIASTOLIC BLOOD PRESSURE: 85 MMHG | RESPIRATION RATE: 18 BRPM | TEMPERATURE: 97.4 F | HEART RATE: 69 BPM | OXYGEN SATURATION: 97 % | SYSTOLIC BLOOD PRESSURE: 140 MMHG

## 2024-12-10 DIAGNOSIS — M48.062 SPINAL STENOSIS OF LUMBAR REGION WITH NEUROGENIC CLAUDICATION: ICD-10-CM

## 2024-12-10 PROCEDURE — 62323 NJX INTERLAMINAR LMBR/SAC: CPT | Performed by: STUDENT IN AN ORGANIZED HEALTH CARE EDUCATION/TRAINING PROGRAM

## 2024-12-10 RX ORDER — METHYLPREDNISOLONE ACETATE 80 MG/ML
80 INJECTION, SUSPENSION INTRA-ARTICULAR; INTRALESIONAL; INTRAMUSCULAR; PARENTERAL; SOFT TISSUE ONCE
Status: COMPLETED | OUTPATIENT
Start: 2024-12-10 | End: 2024-12-10

## 2024-12-10 RX ADMIN — IOHEXOL 1 ML: 300 INJECTION, SOLUTION INTRAVENOUS at 11:08

## 2024-12-10 RX ADMIN — METHYLPREDNISOLONE ACETATE 80 MG: 80 INJECTION, SUSPENSION INTRA-ARTICULAR; INTRALESIONAL; INTRAMUSCULAR; SOFT TISSUE at 11:08

## 2024-12-10 NOTE — H&P
History of Present Illness: The patient is a 74 y.o. male who presents with complaints of back and buttock pain    Past Medical History:   Diagnosis Date    Allergic 02.2020    OTC care    Anxiety 08.1978    on going care    Back injury     Chronic back pain     Depression 09/16/2016    Diabetes mellitus (HCC)     Disease of thyroid gland     Kidney stone 04.1990    passed it    Otitis media 03.1960    treated    Pulmonary embolism (HCC)     SOB (shortness of breath)     Thyroid disease        Past Surgical History:   Procedure Laterality Date    KNEE SURGERY      TONSILLECTOMY           Current Outpatient Medications:     ALPRAZolam (XANAX) 2 MG tablet, Take 1 tablet (2 mg total) by mouth in the morning, Disp: 90 tablet, Rfl: 0    apixaban (Eliquis) 5 mg, Take 1 tablet (5 mg total) by mouth 2 (two) times a day, Disp: 180 tablet, Rfl: 1    ciprofloxacin-dexamethasone (CIPRODEX) otic suspension, Administer 4 drops to the right ear 2 (two) times a day for 7 days, Disp: 7.5 mL, Rfl: 0    citalopram (CeleXA) 40 mg tablet, Take 1 tablet (40 mg total) by mouth daily, Disp: 90 tablet, Rfl: 1    gabapentin (Neurontin) 100 mg capsule, Take 1 capsule (100 mg total) by mouth 2 (two) times a day Take with 300mg for total dose of 400mg BID., Disp: 180 capsule, Rfl: 1    gabapentin (NEURONTIN) 300 mg capsule, Take 1 capsule (300 mg total) by mouth 2 (two) times a day, Disp: 180 capsule, Rfl: 1    levothyroxine 125 mcg tablet, Take 1 tablet (125 mcg total) by mouth daily in the early morning, Disp: 100 tablet, Rfl: 3    metFORMIN (GLUCOPHAGE) 1000 MG tablet, Take 1 tablet (1,000 mg total) by mouth daily, Disp: 90 tablet, Rfl: 3    naloxone (NARCAN) 4 mg/0.1 mL nasal spray, Administer 1 spray into a nostril. If no response after 2-3 minutes, give another dose in the other nostril using a new spray., Disp: 1 each, Rfl: 1    No Known Allergies    Physical Exam:   Vitals:    12/10/24 1051   BP: 129/74   Pulse: 74   Resp: 18   Temp:  (!) 97.4 °F (36.3 °C)   SpO2: 95%     General: Awake, Alert, Oriented x 3, Mood and affect appropriate  Respiratory: Respirations even and unlabored  Cardiovascular: Peripheral pulses intact; no edema  Musculoskeletal Exam: back non erythematous no lesions    ASA Score: 3    Patient/Chart Verification  Patient ID Verified: Verbal  ID Band Applied: No  Consents Confirmed: Procedural, To be obtained in the Pre-Procedure area  H&P( within 30 days) Verified: To be obtained in the Procedural area  Interval H&P(within 24 hr) Complete (required for Outpatients and Surgery Admit only): To be obtained in the Procedural area  Anticoag/NSAID held?: Yes (Eliquis x 3 days)  Currently on antibiotics?: No    Assessment:   1. Spinal stenosis of lumbar region with neurogenic claudication        Plan: L5-S1 [place]

## 2024-12-10 NOTE — DISCHARGE INSTR - LAB
Epidural Steroid Injection   WHAT YOU NEED TO KNOW:   An epidural steroid injection (ISAIAS) is a procedure to inject steroid medicine into the epidural space. The epidural space is between your spinal cord and vertebrae. Steroids reduce inflammation and fluid buildup in your spine that may be causing pain. You may be given pain medicine along with the steroids.          ACTIVITY  Do not drive or operate machinery today.  No strenuous activity today - bending, lifting, etc.  You may resume normal activites starting tomorrow - start slowly and as tolerated.  You may shower today, but no tub baths or hot tubs.  You may have numbness for several hours from the local anesthetic. Please use caution and common sense, especially with weight-bearing activities.    CARE OF THE INJECTION SITE  If you have soreness or pain, apply ice to the area today (20 minutes on/20 minutes off).  Starting tomorrow, you may use warm, moist heat or ice if needed.  You may have an increase or change in your discomfort for 36-48 hours after your treatment.  Apply ice and continue with any pain medication you have been prescribed.  Notify the [facility] if you have any of the following: redness, drainage, swelling, headache, stiff neck or fever above 100°F.    SPECIAL INSTRUCTIONS  Our office will contact you in approximately 14 days for a progress report.    MEDICATIONS  Continue to take all routine medications.  Our office may have instructed you to hold some medications.    As no general anesthesia was used in today's procedure, you should not experience any side effects related to anesthesia.     If you are diabetic, the steroids used in today's injection may temporarily increase your blood sugar levels after the first few days after your injection. Please keep a close eye on your sugars and alert the doctor who manages your diabetes if your sugars are significantly high from your baseline or you are symptomatic.     If you have a  problem specifically related to your procedure, please call our office at [phone].  Problems not related to your procedure should be directed to your primary care physician.

## 2025-01-30 ENCOUNTER — APPOINTMENT (OUTPATIENT)
Dept: LAB | Facility: CLINIC | Age: 75
End: 2025-01-30
Payer: COMMERCIAL

## 2025-01-30 DIAGNOSIS — E03.9 ACQUIRED HYPOTHYROIDISM: ICD-10-CM

## 2025-01-30 DIAGNOSIS — R73.03 PREDIABETES: ICD-10-CM

## 2025-01-30 LAB
ALBUMIN SERPL BCG-MCNC: 4.6 G/DL (ref 3.5–5)
ALP SERPL-CCNC: 57 U/L (ref 34–104)
ALT SERPL W P-5'-P-CCNC: 15 U/L (ref 7–52)
ANION GAP SERPL CALCULATED.3IONS-SCNC: 6 MMOL/L (ref 4–13)
AST SERPL W P-5'-P-CCNC: 20 U/L (ref 13–39)
BILIRUB SERPL-MCNC: 0.62 MG/DL (ref 0.2–1)
BUN SERPL-MCNC: 13 MG/DL (ref 5–25)
CALCIUM SERPL-MCNC: 10.1 MG/DL (ref 8.4–10.2)
CHLORIDE SERPL-SCNC: 101 MMOL/L (ref 96–108)
CO2 SERPL-SCNC: 29 MMOL/L (ref 21–32)
CREAT SERPL-MCNC: 1.17 MG/DL (ref 0.6–1.3)
GFR SERPL CREATININE-BSD FRML MDRD: 61 ML/MIN/1.73SQ M
GLUCOSE P FAST SERPL-MCNC: 102 MG/DL (ref 65–99)
POTASSIUM SERPL-SCNC: 4.6 MMOL/L (ref 3.5–5.3)
PROT SERPL-MCNC: 7.2 G/DL (ref 6.4–8.4)
SODIUM SERPL-SCNC: 136 MMOL/L (ref 135–147)

## 2025-01-30 PROCEDURE — 80053 COMPREHEN METABOLIC PANEL: CPT

## 2025-01-30 PROCEDURE — 36415 COLL VENOUS BLD VENIPUNCTURE: CPT

## 2025-01-30 PROCEDURE — 84443 ASSAY THYROID STIM HORMONE: CPT

## 2025-01-30 PROCEDURE — 83036 HEMOGLOBIN GLYCOSYLATED A1C: CPT

## 2025-01-31 LAB
EST. AVERAGE GLUCOSE BLD GHB EST-MCNC: 126 MG/DL
HBA1C MFR BLD: 6 %
TSH SERPL DL<=0.05 MIU/L-ACNC: 2.06 UIU/ML (ref 0.45–4.5)

## 2025-02-17 ENCOUNTER — OFFICE VISIT (OUTPATIENT)
Dept: FAMILY MEDICINE CLINIC | Facility: CLINIC | Age: 75
End: 2025-02-17
Payer: COMMERCIAL

## 2025-02-17 VITALS
BODY MASS INDEX: 37.46 KG/M2 | OXYGEN SATURATION: 97 % | SYSTOLIC BLOOD PRESSURE: 132 MMHG | WEIGHT: 282.6 LBS | HEIGHT: 73 IN | DIASTOLIC BLOOD PRESSURE: 88 MMHG | HEART RATE: 71 BPM

## 2025-02-17 DIAGNOSIS — F41.9 ANXIETY: ICD-10-CM

## 2025-02-17 DIAGNOSIS — R73.03 PREDIABETES: ICD-10-CM

## 2025-02-17 DIAGNOSIS — I26.99 BILATERAL PULMONARY EMBOLISM (HCC): Primary | ICD-10-CM

## 2025-02-17 DIAGNOSIS — E66.812 CLASS 2 SEVERE OBESITY DUE TO EXCESS CALORIES WITH SERIOUS COMORBIDITY AND BODY MASS INDEX (BMI) OF 37.0 TO 37.9 IN ADULT (HCC): ICD-10-CM

## 2025-02-17 DIAGNOSIS — E03.9 ACQUIRED HYPOTHYROIDISM: ICD-10-CM

## 2025-02-17 DIAGNOSIS — F33.42 RECURRENT MAJOR DEPRESSIVE DISORDER, IN FULL REMISSION (HCC): ICD-10-CM

## 2025-02-17 DIAGNOSIS — E66.01 CLASS 2 SEVERE OBESITY DUE TO EXCESS CALORIES WITH SERIOUS COMORBIDITY AND BODY MASS INDEX (BMI) OF 37.0 TO 37.9 IN ADULT (HCC): ICD-10-CM

## 2025-02-17 PROCEDURE — 99214 OFFICE O/P EST MOD 30 MIN: CPT | Performed by: NURSE PRACTITIONER

## 2025-02-17 RX ORDER — ALPRAZOLAM 2 MG/1
2 TABLET ORAL DAILY
Qty: 90 TABLET | Refills: 0 | Status: SHIPPED | OUTPATIENT
Start: 2025-02-17

## 2025-02-17 RX ORDER — CITALOPRAM HYDROBROMIDE 40 MG/1
40 TABLET ORAL DAILY
Qty: 90 TABLET | Refills: 0 | Status: SHIPPED | OUTPATIENT
Start: 2025-02-17

## 2025-02-17 NOTE — ASSESSMENT & PLAN NOTE
Patient A1c down from 6.2% to 6.0%  He is on metformin 1k daily       Tremfya Counseling: I discussed with the patient the risks of guselkumab including but not limited to immunosuppression, serious infections, worsening of inflammatory bowel disease and drug reactions.  The patient understands that monitoring is required including a PPD at baseline and must alert us or the primary physician if symptoms of infection or other concerning signs are noted.

## 2025-02-17 NOTE — ASSESSMENT & PLAN NOTE
Depression Screening Follow-up Plan: Patient's depression screening was positive with a PHQ-9 score of 5. Patient with underlying depression and was advised to continue current medications as prescribed.  He is overall doing well

## 2025-02-17 NOTE — ASSESSMENT & PLAN NOTE
Patient continues to do well  Contract signed today   PDMP reviewed no red flags   Orders:  •  citalopram (CeleXA) 40 mg tablet; Take 1 tablet (40 mg total) by mouth daily  •  ALPRAZolam (XANAX) 2 MG tablet; Take 1 tablet (2 mg total) by mouth in the morning

## 2025-02-17 NOTE — PROGRESS NOTES
Name: Zelalem Pepe      : 1950      MRN: [id number]  Encounter Provider: REMY Miranda  Encounter Date: 2025   Encounter department: [facility]  :  Assessment & Plan  Anxiety  Patient continues to do well  Contract signed today   PDMP reviewed no red flags   Orders:  •  citalopram (CeleXA) 40 mg tablet; Take 1 tablet (40 mg total) by mouth daily  •  ALPRAZolam (XANAX) 2 MG tablet; Take 1 tablet (2 mg total) by mouth in the morning    Bilateral pulmonary embolism (HCC)  Patient remains stable on eliquis        Acquired hypothyroidism  Recent TSH 2.06  Stable on levothyroxine 125mcg          Recurrent major depressive disorder, in full remission ([facility])  Depression Screening Follow-up Plan: Patient's depression screening was positive with a PHQ-9 score of 5. Patient with underlying depression and was advised to continue current medications as prescribed.  He is overall doing well            Prediabetes  Patient A1c down from 6.2% to 6.0%  He is on metformin 1k daily        Class 2 severe obesity due to excess calories with serious comorbidity and body mass index (BMI) of 37.0 to 37.9 in adult (HCC)  Patient has lost weight  Continue dietary changes and weight loss efforts              Depression Screening and Follow-up Plan: Patient's depression screening was positive with a PHQ-9 score of 5.   Patient with underlying depression and was advised to continue current medications as prescribed.     Falls Plan of Care: balance, strength, and gait training instructions were provided.       History of Present Illness   Patient presents today for follow up  He is doing well no concerns today     Patient had a fall this morning down the driveway. 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         Review of Systems   Constitutional:  Negative for activity change.  "  Respiratory: Negative.     Cardiovascular: Negative.    Musculoskeletal:  Positive for arthralgias and myalgias.   Neurological:  Negative for dizziness and headaches.   Psychiatric/Behavioral:  Negative for dysphoric mood and sleep disturbance.        Objective   /88 (BP Location: Left arm, Patient Position: Sitting, Cuff Size: Standard)   Pulse 71   Ht 6' 1\" (1.854 m)   Wt 128 kg (282 lb 9.6 oz)   SpO2 97%   BMI 37.28 kg/m²      Physical Exam  Vitals and nursing note reviewed.   Constitutional:       General: He is not in acute distress.     Appearance: Normal appearance. He is obese. He is not ill-appearing, toxic-appearing or diaphoretic.   HENT:      Head: Normocephalic and atraumatic.      Right Ear: External ear normal.      Left Ear: External ear normal.   Cardiovascular:      Rate and Rhythm: Normal rate and regular rhythm.      Pulses:           Carotid pulses are 2+ on the right side and 2+ on the left side.     Heart sounds: Normal heart sounds, S1 normal and S2 normal. No murmur heard.  Pulmonary:      Effort: Pulmonary effort is normal. No respiratory distress.      Breath sounds: Normal breath sounds. No wheezing.   Musculoskeletal:      Right lower leg: No edema.      Left lower leg: No edema.   Neurological:      Mental Status: He is alert and oriented to person, place, and time.   Psychiatric:         Mood and Affect: Mood normal.         Behavior: Behavior normal.         Thought Content: Thought content normal. Thought content does not include suicidal ideation.         Judgment: Judgment normal.         "

## 2025-02-22 ENCOUNTER — OFFICE VISIT (OUTPATIENT)
Age: 75
End: 2025-02-22
Payer: COMMERCIAL

## 2025-02-22 VITALS
BODY MASS INDEX: 37.23 KG/M2 | TEMPERATURE: 101.6 F | OXYGEN SATURATION: 100 % | DIASTOLIC BLOOD PRESSURE: 67 MMHG | HEART RATE: 59 BPM | SYSTOLIC BLOOD PRESSURE: 104 MMHG | WEIGHT: 282.19 LBS | RESPIRATION RATE: 16 BRPM

## 2025-02-22 DIAGNOSIS — R68.89 FLU-LIKE SYMPTOMS: Primary | ICD-10-CM

## 2025-02-22 LAB — S PYO AG THROAT QL: NEGATIVE

## 2025-02-22 PROCEDURE — 99213 OFFICE O/P EST LOW 20 MIN: CPT | Performed by: PHYSICIAN ASSISTANT

## 2025-02-22 PROCEDURE — 87636 SARSCOV2 & INF A&B AMP PRB: CPT | Performed by: PHYSICIAN ASSISTANT

## 2025-02-22 PROCEDURE — 87880 STREP A ASSAY W/OPTIC: CPT | Performed by: PHYSICIAN ASSISTANT

## 2025-02-22 RX ORDER — OSELTAMIVIR PHOSPHATE 75 MG/1
75 CAPSULE ORAL EVERY 12 HOURS SCHEDULED
Qty: 10 CAPSULE | Refills: 0 | Status: SHIPPED | OUTPATIENT
Start: 2025-02-22 | End: 2025-02-27

## 2025-02-22 RX ORDER — BENZONATATE 200 MG/1
200 CAPSULE ORAL 3 TIMES DAILY PRN
Qty: 20 CAPSULE | Refills: 0 | Status: SHIPPED | OUTPATIENT
Start: 2025-02-22

## 2025-02-22 NOTE — PROGRESS NOTES
[facility]        NAME: Zelalem Pepe is a 74 y.o. male  : 1950    MRN: [id number]  DATE: 2025  TIME: 11:29 AM    Assessment and Plan   Flu-like symptoms [R68.89]  1. Flu-like symptoms  oseltamivir (TAMIFLU) 75 mg capsule    benzonatate (TESSALON) 200 MG capsule    Throat culture    POCT rapid strepA    Covid/Flu-Office Collect          Advised the patient not to take ibuprofen or other NSAIDs since he is on Eliquis    The patient and/or parent/legal guardian verbalized understanding of exam findings and   Treatment plan. We engaged in the shared decision-making process and treatment options were   discussed at length with the patient.  All questions, concerns and complaints were answered and   addressed to the patient's' and/or parent/legal guardians's satisfaction.    Patient Instructions   There are no Patient Instructions on file for this visit.    Follow up with PCP in 3-5 days.  Proceed to  ER if symptoms worsen.    If tests are performed, our office will contact you with results only if   changes need to made to the care plan discussed with you at the visit.   [facility]     Chief Complaint     Chief Complaint   Patient presents with   • Cold Like Symptoms     Patient states he has a severe headache, bodyaches, fever of 103 since Thursday night.. Patient states he has a dry cough,          History of Present Illness       HPI  Patient presents complaining of severe headache body aches fever 103 since Thursday night he also is endorsing a dry cough.  No sore throat.  Denies any other symptoms.  Has taken ibuprofen.     Review of Systems   Review of Systems  All other related systems reviewed with patient or accompanying historian and are negative except as noted in HPI    Current Medications       Current Outpatient Medications:   •  benzonatate (TESSALON) 200 MG capsule, Take 1 capsule (200 mg total) by mouth 3 (three) times a day  as needed for cough, Disp: 20 capsule, Rfl: 0  •  oseltamivir (TAMIFLU) 75 mg capsule, Take 1 capsule (75 mg total) by mouth every 12 (twelve) hours for 5 days, Disp: 10 capsule, Rfl: 0  •  ALPRAZolam (XANAX) 2 MG tablet, Take 1 tablet (2 mg total) by mouth in the morning, Disp: 90 tablet, Rfl: 0  •  apixaban (Eliquis) 5 mg, Take 1 tablet (5 mg total) by mouth 2 (two) times a day, Disp: 180 tablet, Rfl: 1  •  ciprofloxacin-dexamethasone (CIPRODEX) otic suspension, Administer 4 drops to the right ear 2 (two) times a day for 7 days, Disp: 7.5 mL, Rfl: 0  •  citalopram (CeleXA) 40 mg tablet, Take 1 tablet (40 mg total) by mouth daily, Disp: 90 tablet, Rfl: 0  •  gabapentin (Neurontin) 100 mg capsule, Take 1 capsule (100 mg total) by mouth 2 (two) times a day Take with 300mg for total dose of 400mg BID., Disp: 180 capsule, Rfl: 1  •  gabapentin (NEURONTIN) 300 mg capsule, Take 1 capsule (300 mg total) by mouth 2 (two) times a day, Disp: 180 capsule, Rfl: 1  •  levothyroxine 125 mcg tablet, Take 1 tablet (125 mcg total) by mouth daily in the early morning, Disp: 100 tablet, Rfl: 3  •  metFORMIN (GLUCOPHAGE) 1000 MG tablet, Take 1 tablet (1,000 mg total) by mouth daily, Disp: 90 tablet, Rfl: 3  •  naloxone (NARCAN) 4 mg/0.1 mL nasal spray, Administer 1 spray into a nostril. If no response after 2-3 minutes, give another dose in the other nostril using a new spray., Disp: 1 each, Rfl: 1    Current Allergies     Allergies as of 02/22/2025   • (No Known Allergies)            The following portions of the patient's history were reviewed and updated as appropriate: allergies, current medications, past family history, past medical history, past social history, past surgical history and problem list.     Past Medical History:   Diagnosis Date   • Allergic 02.2020    OTC care   • Anxiety 08.1978    on going care   • Back injury    • Chronic back pain    • Depression 09/16/2016   • Diabetes mellitus (HCC)    • Disease of thyroid  gland    • Kidney stone 04.1990    passed it   • Otitis media 03.1960    treated   • Pulmonary embolism (HCC)    • SOB (shortness of breath)    • Thyroid disease        Past Surgical History:   Procedure Laterality Date   • KNEE SURGERY     • TONSILLECTOMY         Family History   Problem Relation Age of Onset   • Hypothyroidism Mother    • Mental illness Mother         manic depression   • Stroke Mother    • Depression Mother         manic depression   • Bipolar disorder Mother         complete denial   • Hypothyroidism Sister    • Cancer Sister    • Cancer Father         prostate and bone marrow   • Cancer Maternal Grandfather         pancreatic   • Thyroid disease Sister    • Autoimmune disease Sister    • Cancer Sister         uterine   • Cancer Maternal Uncle         Pancreatic         Medications have been verified.        Objective   /67   Pulse 59   Temp (!) 101.6 °F (38.7 °C)   Resp 16   Wt 128 kg (282 lb 3 oz)   SpO2 100%   BMI 37.23 kg/m²   No LMP for male patient.       Physical Exam     Physical Exam  Constitutional:       General: He is not in acute distress.     Appearance: He is well-developed.   HENT:      Head: Normocephalic and atraumatic.      Mouth/Throat:      Mouth: Mucous membranes are moist.      Pharynx: Posterior oropharyngeal erythema present.   Eyes:      General: No scleral icterus.     Conjunctiva/sclera: Conjunctivae normal.   Cardiovascular:      Rate and Rhythm: Normal rate and regular rhythm.      Heart sounds: Normal heart sounds. No murmur heard.  Pulmonary:      Effort: Pulmonary effort is normal. No respiratory distress.      Breath sounds: No stridor. No wheezing, rhonchi or rales.   Musculoskeletal:      Cervical back: Normal range of motion and neck supple.   Lymphadenopathy:      Cervical: No cervical adenopathy.   Skin:     General: Skin is warm and dry.   Neurological:      Mental Status: He is alert and oriented to person, place, and time.   Psychiatric:     "     Behavior: Behavior normal.         Ortho Exam        Procedures  No Procedures performed today        Note: Portions of this record may have been created with voice recognition software. Occasional wrong word or \"sound a like\" substitutions may have occurred due to the inherent limitations of voice recognition software. Please read the chart carefully and recognize, using context, where substitutions have occurred.*      "

## 2025-02-22 NOTE — LETTER
February 22, 2025     Patient: Zelalem Pepe   YOB: 1950   Date of Visit: 2/22/2025       To Whom It May Concern:    It is my medical opinion that Zelalem Pepe may return to work on date that he has been fever free for 24 hours and symptoms improving for 24 hours. .    If you have any questions or concerns, please don't hesitate to call.         Sincerely,        Ladarius Shannon PA-C    CC: No Recipients

## 2025-02-24 LAB
FLUAV RNA RESP QL NAA+PROBE: NEGATIVE
FLUBV RNA RESP QL NAA+PROBE: NEGATIVE
SARS-COV-2 RNA RESP QL NAA+PROBE: POSITIVE

## 2025-03-12 DIAGNOSIS — I26.99 BILATERAL PULMONARY EMBOLISM (HCC): ICD-10-CM

## 2025-05-26 DIAGNOSIS — I26.99 BILATERAL PULMONARY EMBOLISM (HCC): ICD-10-CM

## 2025-05-26 DIAGNOSIS — F41.9 ANXIETY: ICD-10-CM

## 2025-05-26 DIAGNOSIS — E03.9 ACQUIRED HYPOTHYROIDISM: ICD-10-CM

## 2025-05-27 RX ORDER — CITALOPRAM HYDROBROMIDE 40 MG/1
40 TABLET ORAL DAILY
Qty: 90 TABLET | Refills: 1 | Status: SHIPPED | OUTPATIENT
Start: 2025-05-27

## 2025-05-29 RX ORDER — APIXABAN 5 MG/1
5 TABLET, FILM COATED ORAL 2 TIMES DAILY
Qty: 180 TABLET | Refills: 3 | Status: SHIPPED | OUTPATIENT
Start: 2025-05-29

## 2025-05-29 RX ORDER — LEVOTHYROXINE SODIUM 100 UG/1
100 TABLET ORAL
Qty: 90 TABLET | Refills: 3 | OUTPATIENT
Start: 2025-05-29

## 2025-05-29 RX ORDER — LEVOTHYROXINE SODIUM 125 UG/1
125 TABLET ORAL
Qty: 100 TABLET | Refills: 3 | Status: SHIPPED | OUTPATIENT
Start: 2025-05-29

## 2025-05-29 RX ORDER — ALPRAZOLAM 2 MG/1
2 TABLET ORAL EVERY MORNING
Qty: 90 TABLET | Refills: 0 | Status: SHIPPED | OUTPATIENT
Start: 2025-05-29

## 2025-06-02 DIAGNOSIS — M54.32 SCIATICA OF LEFT SIDE: ICD-10-CM

## 2025-06-04 RX ORDER — GABAPENTIN 300 MG/1
300 CAPSULE ORAL 2 TIMES DAILY
Qty: 180 CAPSULE | Refills: 1 | Status: SHIPPED | OUTPATIENT
Start: 2025-06-04

## 2025-06-06 DIAGNOSIS — M54.42 CHRONIC BILATERAL LOW BACK PAIN WITH BILATERAL SCIATICA: Primary | ICD-10-CM

## 2025-06-06 DIAGNOSIS — G89.29 CHRONIC BILATERAL LOW BACK PAIN WITH BILATERAL SCIATICA: Primary | ICD-10-CM

## 2025-06-06 DIAGNOSIS — M54.41 CHRONIC BILATERAL LOW BACK PAIN WITH BILATERAL SCIATICA: Primary | ICD-10-CM

## 2025-06-09 ENCOUNTER — TELEPHONE (OUTPATIENT)
Dept: OBGYN CLINIC | Facility: CLINIC | Age: 75
End: 2025-06-09

## 2025-06-09 NOTE — TELEPHONE ENCOUNTER
Called pt, CHARLENE, to reschedule appt for 6/9/25 as Dr. Patton is out of the office today. Asked pt to call back, [phone]

## 2025-06-10 ENCOUNTER — OFFICE VISIT (OUTPATIENT)
Dept: PAIN MEDICINE | Facility: CLINIC | Age: 75
End: 2025-06-10
Payer: COMMERCIAL

## 2025-06-10 ENCOUNTER — TELEPHONE (OUTPATIENT)
Dept: PAIN MEDICINE | Facility: CLINIC | Age: 75
End: 2025-06-10

## 2025-06-10 VITALS — WEIGHT: 282 LBS | HEIGHT: 73 IN | BODY MASS INDEX: 37.37 KG/M2

## 2025-06-10 DIAGNOSIS — M54.41 CHRONIC BILATERAL LOW BACK PAIN WITH BILATERAL SCIATICA: ICD-10-CM

## 2025-06-10 DIAGNOSIS — M48.062 SPINAL STENOSIS OF LUMBAR REGION WITH NEUROGENIC CLAUDICATION: Primary | ICD-10-CM

## 2025-06-10 DIAGNOSIS — M54.16 LUMBAR RADICULOPATHY: ICD-10-CM

## 2025-06-10 DIAGNOSIS — G89.29 CHRONIC BILATERAL LOW BACK PAIN WITH BILATERAL SCIATICA: ICD-10-CM

## 2025-06-10 DIAGNOSIS — M54.42 CHRONIC BILATERAL LOW BACK PAIN WITH BILATERAL SCIATICA: ICD-10-CM

## 2025-06-10 PROCEDURE — 99214 OFFICE O/P EST MOD 30 MIN: CPT

## 2025-06-10 RX ORDER — SENNOSIDES 8.6 MG
650 CAPSULE ORAL EVERY 8 HOURS PRN
COMMUNITY

## 2025-06-10 NOTE — TELEPHONE ENCOUNTER
Pt is scheduled for LESI with Dr Gillette on 6/12/25     Pt takes Eliquis -- separate message sent to clinical on same date regarding hold    Pt is not diabetic     Pt was given instructions in office and via [facility] message     Have you completed PT/HEP/Chiro in the past 6 months for dedicated area? no  If yes, how long did you complete?  What was the frequency?  Did it provide relief?  If no, reason therapy was not completed?     - pt has had previous injections for pain relief     - as of Nov 2024: pt reports he did PT approx 8 years ago for his lower back -- reports it did not help his pain -- does not recall where it was done, just that it was at a facility in [facility].

## 2025-06-10 NOTE — PROGRESS NOTES
Name: Zelalem Pepe      : 1950      MRN: [id number]  Encounter Provider: REMY Reina  Encounter Date: 6/10/2025   Encounter department: [facility] [place]  :  Assessment & Plan  Spinal stenosis of lumbar region with neurogenic claudication    Orders:  •  FL spine and pain procedure; Future    Lumbar radiculopathy    Orders:  •  FL spine and pain procedure; Future    Chronic bilateral low back pain with bilateral sciatica           Patient returns to the office with worsening bilateral low back pain that radiates down his posterior legs.  In the past the patient has benefited greatly from a lumbar epidural steroid injection at L5-S1.  Patient states that he received approximately 90 days of 75% reduction of pain which is now returning is now returning and wishes to have a repeat lumbar epidural steroid injection at L5-S1.  I recommend patient have a repeat LESI at L5-S1. Complete risks and benefits including bleeding, infection, tissue reaction, nerve injury and allergic reaction were discussed. The approach was demonstrated using models and literature was provided. Verbal and written consent was obtained.    My impressions and treatment recommendations were discussed in detail with the patient who verbalized understanding and had no further questions.  Discharge instructions were provided. I personally saw and examined the patient and I agree with the above discussed plan of care.    History of Present Illness {?Quick Links Encounters * My Last Note * Last Note in Specialty * Snapshot * Since Last Visit * History :15175}    Zelalem Pepe is a 75 y.o. male who presents for a follow up office visit in regards to Back Pain (B/L lower back pain down to buttocks and posterior upper legs follow up). At today's visit patient states that their pain symptoms are worse with a pain score of 8/10 on the verbal numeric pain scale.  The patient's pain is worse in the morning,  "evening, and at night.  The patient's pain is constant in nature.  And the quality of the patient's pain is described as shooting.  The patient's pain is located in the bilateral low back radiating down his posterior legs to his mid calf.  Patient is currently using gabapentin which is prescribed through his PCP.    Review of Systems   Respiratory:  Negative for shortness of breath.    Cardiovascular:  Negative for chest pain.   Gastrointestinal:  Negative for constipation, diarrhea, nausea and vomiting.   Musculoskeletal:  Positive for back pain and gait problem. Negative for arthralgias, joint swelling and myalgias.        DROM   Skin:  Negative for rash.   Neurological:  Positive for weakness. Negative for dizziness and seizures.   All other systems reviewed and are negative.      Medical History Reviewed by provider this encounter:  Tobacco  Allergies  Meds  Problems  Med Hx  Surg Hx  Fam Hx     .     Objective {?Quick Links Trend Vitals * Enter New Vitals * Results Review * Timeline (Adult) * Labs * Imaging * Cardiology * Procedures * Lung Cancer Screening * Surgical eConsent :80688}  Ht 6' 1\" (1.854 m)   Wt 128 kg (282 lb)   BMI 37.21 kg/m²      Pain Score:   8    Constitutional:normal, well developed, well nourished, alert, in no distress and non-toxic and no overt pain behavior. and obese  Eyes:anicteric  HEENT:grossly intact  Neck:supple, symmetric, trachea midline and no masses   Pulmonary:even and unlabored  Cardiovascular:No edema or pitting edema present  Skin:Normal without rashes or lesions and well hydrated  Psychiatric:Mood and affect appropriate  Neurologic:Cranial Nerves II-XII grossly intact  Musculoskeletal: normal gait    This document was created using speech voice recognition software.   Grammatical errors, random word insertions, pronoun errors, and incomplete sentences are an occasional consequence of this system due to software limitations, ambient noise, and hardware issues. "   Any formal questions or concerns about content, text, or information contained within the body of this dictation should be directly addressed to the provider for clarification.

## 2025-06-10 NOTE — TELEPHONE ENCOUNTER
Pt is scheduled for LESI with Dr Gillette on 6/12/25    Pt takes Eliquis, prescribed by pcp Kaylie Eli.    Is aware of need to hold med and that he will receive call from nurses regarding hold.

## 2025-06-10 NOTE — PATIENT INSTRUCTIONS
Epidural Steroid Injection, Ambulatory Care   GENERAL INFORMATION:   What do I need to know about an epidural steroid injection?  An epidural steroid injection (ISAIAS) is a procedure to inject steroid medicine into the epidural space. The epidural space is between your spinal cord and vertebrae. Steroids reduce inflammation and fluid buildup in your spine that may be causing pain. You may be given pain medicine along with the steroids.   How do I prepare for an ISAIAS?  Your healthcare provider will talk to you about how to prepare for your procedure. He will tell you what medicines to take or not take on the day of your procedure. You may need to stop taking blood thinners or other medicines several days before your procedure. You may need to adjust any diabetes medicine you take on the day of your procedure. Steroid medicine can increase your blood sugar level.   What will happen during an ISAIAS?   You will be given medicine to numb the procedure area. You will be awake for the procedure, but you will not feel pain. You may also be given medicine to help you relax during the procedure. Contrast liquid will be used to help your healthcare provider see the area better. Tell the healthcare provider if you have ever had an allergic reaction to contrast liquid.    Your healthcare provider may place the needle into your neck area, middle of your back, or tailbone area. He may inject the medicine next to the nerves that are causing your pain. He may instead inject the medicine into a larger area of the epidural space. This helps the medicine spread to more nerves. Your healthcare provider will use a fluoroscope to help guide the needle to the right place. A fluoroscope is a type of x-ray. After the procedure, a bandage will be placed over the injection site to prevent infection.  What are the risks of an ISAIAS?  You may have temporary or permanent nerve damage or paralysis. You may have bleeding or develop a serious infection,  such as meningitis (swelling of the brain coverings). An abscess may also develop. You may need surgery to fix the abscess. You may have a seizure, anxiety, or trouble sleeping. If you are a man, you may have temporary erectile dysfunction (not able to have an erection).   CARE AGREEMENT:   You have the right to help plan your care. Learn about your health condition and how it may be treated. Discuss treatment options with your caregivers to decide what care you want to receive. You always have the right to refuse treatment. The above information is an  only. It is not intended as medical advice for individual conditions or treatments. Talk to your doctor, nurse or pharmacist before following any medical regimen to see if it is safe and effective for you.  © 2014 [employer] Information is for End User's use only and may not be sold, redistributed or otherwise used for commercial purposes. All illustrations and images included in CareNotes® are the copyrighted property of A.D.A[employer]M., Inc. or [employer].

## 2025-06-10 NOTE — H&P (VIEW-ONLY)
Name: Zelalem Pepe      : 1950      MRN: [id number]  Encounter Provider: REMY Reina  Encounter Date: 6/10/2025   Encounter department: [facility] [place]  :  Assessment & Plan  Spinal stenosis of lumbar region with neurogenic claudication    Orders:  •  FL spine and pain procedure; Future    Lumbar radiculopathy    Orders:  •  FL spine and pain procedure; Future    Chronic bilateral low back pain with bilateral sciatica           Patient returns to the office with worsening bilateral low back pain that radiates down his posterior legs.  In the past the patient has benefited greatly from a lumbar epidural steroid injection at L5-S1.  Patient states that he received approximately 90 days of 75% reduction of pain which is now returning is now returning and wishes to have a repeat lumbar epidural steroid injection at L5-S1.  I recommend patient have a repeat LESI at L5-S1. Complete risks and benefits including bleeding, infection, tissue reaction, nerve injury and allergic reaction were discussed. The approach was demonstrated using models and literature was provided. Verbal and written consent was obtained.    My impressions and treatment recommendations were discussed in detail with the patient who verbalized understanding and had no further questions.  Discharge instructions were provided. I personally saw and examined the patient and I agree with the above discussed plan of care.    History of Present Illness     Zelalem Pepe is a 75 y.o. male who presents for a follow up office visit in regards to Back Pain (B/L lower back pain down to buttocks and posterior upper legs follow up). At today's visit patient states that their pain symptoms are worse with a pain score of 8/10 on the verbal numeric pain scale.  The patient's pain is worse in the morning, evening, and at night.  The patient's pain is constant in nature.  And the quality of the patient's pain is  "described as shooting.  The patient's pain is located in the bilateral low back radiating down his posterior legs to his mid calf.  Patient is currently using gabapentin which is prescribed through his PCP.    Review of Systems   Respiratory:  Negative for shortness of breath.    Cardiovascular:  Negative for chest pain.   Gastrointestinal:  Negative for constipation, diarrhea, nausea and vomiting.   Musculoskeletal:  Positive for back pain and gait problem. Negative for arthralgias, joint swelling and myalgias.        DROM   Skin:  Negative for rash.   Neurological:  Positive for weakness. Negative for dizziness and seizures.   All other systems reviewed and are negative.      Medical History Reviewed by provider this encounter:  Tobacco  Allergies  Meds  Problems  Med Hx  Surg Hx  Fam Hx     .     Objective   Ht 6' 1\" (1.854 m)   Wt 128 kg (282 lb)   BMI 37.21 kg/m²      Pain Score:   8    Constitutional:normal, well developed, well nourished, alert, in no distress and non-toxic and no overt pain behavior. and obese  Eyes:anicteric  HEENT:grossly intact  Neck:supple, symmetric, trachea midline and no masses   Pulmonary:even and unlabored  Cardiovascular:No edema or pitting edema present  Skin:Normal without rashes or lesions and well hydrated  Psychiatric:Mood and affect appropriate  Neurologic:Cranial Nerves II-XII grossly intact  Musculoskeletal: normal gait    This document was created using speech voice recognition software.   Grammatical errors, random word insertions, pronoun errors, and incomplete sentences are an occasional consequence of this system due to software limitations, ambient noise, and hardware issues.   Any formal questions or concerns about content, text, or information contained within the body of this dictation should be directly addressed to the provider for clarification.      "

## 2025-06-10 NOTE — TELEPHONE ENCOUNTER
Good morning,  This mutual patient is scheduled for an upcoming lumbar epidural steroid injection. He takes Eliquis per your orders, patient reported this morning that he stopped his Eliquis on his own without instruction per our office on Saturday. Our guidelines require a 3 day hold, is it ok for patient to continue to hold for his procedure on Thursday? (Total of 5 days)

## 2025-06-11 NOTE — TELEPHONE ENCOUNTER
S/W wife Hannah and advised the same  Confirmed pt has been holding Eliquis and will not take today or tomorrow

## 2025-06-11 NOTE — TELEPHONE ENCOUNTER
Please have a Provider advise on prior RN's message regarding pt holding Eliquis.  He is currently scheduled tomorrow for his Epidural and has been holding on his own since Saturday

## 2025-06-12 ENCOUNTER — HOSPITAL ENCOUNTER (OUTPATIENT)
Dept: RADIOLOGY | Facility: CLINIC | Age: 75
Discharge: HOME | End: 2025-06-12
Payer: COMMERCIAL

## 2025-06-12 VITALS
TEMPERATURE: 96.8 F | DIASTOLIC BLOOD PRESSURE: 86 MMHG | SYSTOLIC BLOOD PRESSURE: 146 MMHG | RESPIRATION RATE: 18 BRPM | HEART RATE: 75 BPM | OXYGEN SATURATION: 95 %

## 2025-06-12 DIAGNOSIS — M54.16 LUMBAR RADICULOPATHY: ICD-10-CM

## 2025-06-12 DIAGNOSIS — M48.062 SPINAL STENOSIS OF LUMBAR REGION WITH NEUROGENIC CLAUDICATION: ICD-10-CM

## 2025-06-12 PROCEDURE — 62323 NJX INTERLAMINAR LMBR/SAC: CPT | Performed by: STUDENT IN AN ORGANIZED HEALTH CARE EDUCATION/TRAINING PROGRAM

## 2025-06-12 RX ORDER — PAPAVERINE HCL 150 MG
20 CAPSULE, EXTENDED RELEASE ORAL ONCE
Status: COMPLETED | OUTPATIENT
Start: 2025-06-12 | End: 2025-06-12

## 2025-06-12 RX ADMIN — DEXAMETHASONE SODIUM PHOSPHATE 20 MG: 10 INJECTION, SOLUTION INTRAMUSCULAR; INTRAVENOUS at 11:54

## 2025-06-12 RX ADMIN — IOHEXOL 1 ML: 300 INJECTION, SOLUTION INTRAVENOUS at 11:54

## 2025-06-12 NOTE — DISCHARGE INSTR - LAB
Epidural Steroid Injection   WHAT YOU NEED TO KNOW:   An epidural steroid injection (ISAIAS) is a procedure to inject steroid medicine into the epidural space. The epidural space is between your spinal cord and vertebrae. Steroids reduce inflammation and fluid buildup in your spine that may be causing pain. You may be given pain medicine along with the steroids.          ACTIVITY  Do not drive or operate machinery today.  No strenuous activity today - bending, lifting, etc.  You may resume normal activites starting tomorrow - start slowly and as tolerated.  You may shower today, but no tub baths or hot tubs.  You may have numbness for several hours from the local anesthetic. Please use caution and common sense, especially with weight-bearing activities.    CARE OF THE INJECTION SITE  If you have soreness or pain, apply ice to the area today (20 minutes on/20 minutes off).  Starting tomorrow, you may use warm, moist heat or ice if needed.  You may have an increase or change in your discomfort for 36-48 hours after your treatment.  Apply ice and continue with any pain medication you have been prescribed.  Notify the [facility] if you have any of the following: redness, drainage, swelling, headache, stiff neck or fever above 100°F.    SPECIAL INSTRUCTIONS  Our office will contact you in approximately 14 days for a progress report.    MEDICATIONS  Continue to take all routine medications.  Our office may have instructed you to hold some medications.    As no general anesthesia was used in today's procedure, you should not experience any side effects related to anesthesia.     If you are diabetic, the steroids used in today's injection may temporarily increase your blood sugar levels after the first few days after your injection. Please keep a close eye on your sugars and alert the doctor who manages your diabetes if your sugars are significantly high from your baseline or you are symptomatic.     If you have a  problem specifically related to your procedure, please call our office at [phone].  Problems not related to your procedure should be directed to your primary care physician.

## 2025-06-12 NOTE — INTERVAL H&P NOTE
Update: (This section must be completed if the H&P was completed greater than 24 hrs to procedure or admission)    H&P reviewed. After examining the patient, I find no changed to the H&P since it had been written.    Patient re-evaluated. Accept as history and physical.    Dennis Gillette MD/June 12, 2025/11:43 AM

## 2025-06-23 ENCOUNTER — APPOINTMENT (OUTPATIENT)
Dept: RADIOLOGY | Facility: CLINIC | Age: 75
End: 2025-06-23
Attending: ORTHOPAEDIC SURGERY
Payer: COMMERCIAL

## 2025-06-23 ENCOUNTER — OFFICE VISIT (OUTPATIENT)
Dept: OBGYN CLINIC | Facility: CLINIC | Age: 75
End: 2025-06-23
Payer: COMMERCIAL

## 2025-06-23 VITALS — WEIGHT: 279 LBS | BODY MASS INDEX: 36.98 KG/M2 | HEIGHT: 73 IN

## 2025-06-23 DIAGNOSIS — M25.521 ELBOW PAIN, RIGHT: ICD-10-CM

## 2025-06-23 DIAGNOSIS — M70.21 OLECRANON BURSITIS, RIGHT ELBOW: ICD-10-CM

## 2025-06-23 DIAGNOSIS — M25.521 ELBOW PAIN, RIGHT: Primary | ICD-10-CM

## 2025-06-23 PROCEDURE — 99213 OFFICE O/P EST LOW 20 MIN: CPT | Performed by: ORTHOPAEDIC SURGERY

## 2025-06-23 PROCEDURE — 73080 X-RAY EXAM OF ELBOW: CPT

## 2025-06-23 NOTE — PROGRESS NOTES
Name: Zelalem Pepe      : 1950      MRN: [id number]  Encounter Provider: Antolin Patton DO  Encounter Date: 2025   Encounter department: [facility] [place]  :  Assessment & Plan  Elbow pain, right    Orders:    XR elbow 3+ vw right; Future    Olecranon bursitis, right elbow    Right Elbow olecranon bursitis from trauma 4 months ago, near completely resolved.  X-rays were reviewed in office today with patient  Discussed with patient his diagnosis and ways to avoid further irritation. Advised to avoid WB to the elbow, recommended elbow pad if planning on increased activities and avoid bumping or leaning on elbow.  May use Ace bandage or compression sleeve and ice if swelling reoccurs.  If pain or swelling reoccurs, advised to follow up will consider aspiration and injection.  Follow up on an as needed basis.     Chief Complaint     Right shoulder pain    History of the Present Illness   History of Present Illness   HPI   Zelalem Pepe is a RHD 75 y.o. male with Right elbow pain that started 4 months ago when he fell on the ice landing directly on his right elbow to avoid hitting his head or back as he notes baseline lumbar pathology. He admits to instant swelling and pain, denies seeking treatment at the time which resolved on its own. Patient notes recent flare up one month ago with an achy sensation that recently started improving by taking occasional nsaids. Patient admits to taking Eliquis for previous PE so limits his nsaids. He has minimal complaints of pain at the time, occasional weakness when working on cars for his hobby. He denies numbness or tingling at the time.     Review of Systems     Review of Systems   Constitutional:  Negative for chills and fever.   HENT:  Negative for ear pain and sore throat.    Eyes:  Negative for pain and visual disturbance.   Respiratory:  Negative for cough and shortness of breath.    Cardiovascular:  Negative for  "chest pain and palpitations.   Gastrointestinal:  Negative for abdominal pain and vomiting.   Genitourinary:  Negative for dysuria and hematuria.   Musculoskeletal:  Positive for arthralgias. Negative for back pain.   Skin:  Negative for color change and rash.   Neurological:  Negative for seizures and syncope.   All other systems reviewed and are negative.      Physical Exam   Objective   Ht 6' 1\" (1.854 m)   Wt 127 kg (279 lb)   BMI 36.81 kg/m²      Right Elbow    No Fluctuance   Active range of motion 0 to 150 degrees  Full composite fist  Non tender over medial lateral epicondyle or olecranon  Wrist ROM full  Varus and valgus stress stable  Neurovascularly intact distally         Eyes:  Anicteric sclerae.  Neck:  Supple.  Lungs:  Normal respiratory effort.  Cardiovascular:  Capillary refill is less than 2 seconds.  Skin:  Intact without erythema.  Neurologic:  Sensation grossly intact to light touch.  Psychiatric:  Mood and affect are appropriate.    Data Review     I have personally reviewed pertinent films in PACS, and my interpretation follows:    X-rays taken 6/23/2025 of Right elbow independently reviewed and demonstrate no evidence of a fracture or dislocation. Minimal soft tissue swelling.     Lab Results   Component Value Date/Time    HGBA1C 6.0 (H) 01/30/2025 09:03 AM    HGBA1C 6.2 (H) 11/05/2024 08:57 AM       Past Medical History[1]    Past Surgical History[2]    Allergies[3]    Medications Ordered Prior to Encounter[4]    Social History[5]    Family History[6]      Procedures Performed     Procedures  No procedures were performed today     Scribe Attestation      I,:  Misa Resendiz am acting as a scribe while in the presence of the attending physician.:       I,:  Antolin Patton DO personally performed the services described in this documentation    as scribed in my presence.:                 [1]   Past Medical History:  Diagnosis Date    Allergic 02.2020    OTC care    Anxiety 08.1978    " on going care    Back injury     Chronic back pain     Depression 09/16/2016    Diabetes mellitus (HCC)     Disease of thyroid gland     Kidney stone 04.1990    passed it    Otitis media 03.1960    treated    Pulmonary embolism (HCC)     SOB (shortness of breath)     Thyroid disease    [2]   Past Surgical History:  Procedure Laterality Date    KNEE SURGERY      TONSILLECTOMY     [3] No Known Allergies  [4]   Current Outpatient Medications on File Prior to Visit   Medication Sig Dispense Refill    acetaminophen (TYLENOL) 650 mg CR tablet Take 650 mg by mouth every 8 (eight) hours as needed for mild pain      ALPRAZolam (XANAX) 2 MG tablet TAKE 1 TABLET IN THE MORNING 90 tablet 0    citalopram (CeleXA) 40 mg tablet TAKE 1 TABLET DAILY 90 tablet 1    Eliquis 5 MG TAKE 1 TABLET TWICE A  tablet 3    gabapentin (Neurontin) 100 mg capsule Take 1 capsule (100 mg total) by mouth 2 (two) times a day Take with 300mg for total dose of 400mg BID. 180 capsule 1    gabapentin (NEURONTIN) 300 mg capsule Take 1 capsule (300 mg total) by mouth 2 (two) times a day 180 capsule 1    levothyroxine 125 mcg tablet Take 1 tablet (125 mcg total) by mouth daily in the early morning 100 tablet 3    metFORMIN (GLUCOPHAGE) 1000 MG tablet Take 1 tablet (1,000 mg total) by mouth daily 90 tablet 3    [DISCONTINUED] benzonatate (TESSALON) 200 MG capsule Take 1 capsule (200 mg total) by mouth 3 (three) times a day as needed for cough 20 capsule 0    [DISCONTINUED] ciprofloxacin-dexamethasone (CIPRODEX) otic suspension Administer 4 drops to the right ear 2 (two) times a day for 7 days 7.5 mL 0    [DISCONTINUED] naloxone (NARCAN) 4 mg/0.1 mL nasal spray Administer 1 spray into a nostril. If no response after 2-3 minutes, give another dose in the other nostril using a new spray. 1 each 1     No current facility-administered medications on file prior to visit.   [5]   Social History  Tobacco Use    Smoking status: Former     Current packs/day:  0.00     Average packs/day: 0.5 packs/day for 32.4 years (16.2 ttl pk-yrs)     Types: Cigarettes     Start date: 1968     Quit date: 2001     Years since quittin.4    Smokeless tobacco: Never    Tobacco comments:     never a heave smoker   Vaping Use    Vaping status: Never Used   Substance Use Topics    Alcohol use: Yes     Alcohol/week: 1.0 standard drink of alcohol     Types: 1 Cans of beer per week     Comment: occasional. 1 drink/wk     Drug use: Not Currently     Types: Marijuana     Comment: have not smoked since PE   [6]   Family History  Problem Relation Name Age of Onset    Hypothyroidism Mother Nguyen     Mental illness Mother Nguyen         manic depression    Stroke Mother Nguyen     Depression Mother Nguyen         manic depression    Bipolar disorder Mother Nguyen         complete denial    Hypothyroidism Sister      Cancer Sister      Cancer Father Zelalem I         prostate and bone marrow    Cancer Maternal Grandfather Jeffery         pancreatic    Thyroid disease Sister Zeina     Autoimmune disease Sister Zeina     Cancer Sister Zeina         uterine    Cancer Maternal Uncle Reggie         Pancreatic

## 2025-07-01 ENCOUNTER — OFFICE VISIT (OUTPATIENT)
Dept: FAMILY MEDICINE CLINIC | Facility: CLINIC | Age: 75
End: 2025-07-01
Payer: COMMERCIAL

## 2025-07-01 VITALS
DIASTOLIC BLOOD PRESSURE: 84 MMHG | HEART RATE: 61 BPM | SYSTOLIC BLOOD PRESSURE: 120 MMHG | HEIGHT: 73 IN | BODY MASS INDEX: 37.24 KG/M2 | WEIGHT: 281 LBS | OXYGEN SATURATION: 97 %

## 2025-07-01 DIAGNOSIS — R73.03 PREDIABETES: ICD-10-CM

## 2025-07-01 DIAGNOSIS — G89.29 CHRONIC BILATERAL LOW BACK PAIN WITH BILATERAL SCIATICA: ICD-10-CM

## 2025-07-01 DIAGNOSIS — E03.9 ACQUIRED HYPOTHYROIDISM: ICD-10-CM

## 2025-07-01 DIAGNOSIS — M54.42 CHRONIC BILATERAL LOW BACK PAIN WITH BILATERAL SCIATICA: ICD-10-CM

## 2025-07-01 DIAGNOSIS — Z00.00 ANNUAL PHYSICAL EXAM: Primary | ICD-10-CM

## 2025-07-01 DIAGNOSIS — M54.41 CHRONIC BILATERAL LOW BACK PAIN WITH BILATERAL SCIATICA: ICD-10-CM

## 2025-07-01 DIAGNOSIS — Z79.899 OTHER LONG TERM (CURRENT) DRUG THERAPY: ICD-10-CM

## 2025-07-01 DIAGNOSIS — F33.42 RECURRENT MAJOR DEPRESSIVE DISORDER, IN FULL REMISSION (HCC): ICD-10-CM

## 2025-07-01 DIAGNOSIS — M54.32 SCIATICA OF LEFT SIDE: ICD-10-CM

## 2025-07-01 DIAGNOSIS — I26.99 BILATERAL PULMONARY EMBOLISM (HCC): ICD-10-CM

## 2025-07-01 DIAGNOSIS — Z12.5 SCREENING FOR PROSTATE CANCER: ICD-10-CM

## 2025-07-01 DIAGNOSIS — F41.9 ANXIETY: ICD-10-CM

## 2025-07-01 DIAGNOSIS — M47.817 LUMBOSACRAL SPONDYLOSIS WITHOUT MYELOPATHY: ICD-10-CM

## 2025-07-01 DIAGNOSIS — M23.92 INTERNAL DERANGEMENT OF LEFT KNEE: ICD-10-CM

## 2025-07-01 PROCEDURE — 99214 OFFICE O/P EST MOD 30 MIN: CPT | Performed by: NURSE PRACTITIONER

## 2025-07-01 PROCEDURE — 99397 PER PM REEVAL EST PAT 65+ YR: CPT | Performed by: NURSE PRACTITIONER

## 2025-07-01 RX ORDER — CYCLOBENZAPRINE HCL 10 MG
10 TABLET ORAL 3 TIMES DAILY PRN
Qty: 21 TABLET | Refills: 3 | Status: SHIPPED | OUTPATIENT
Start: 2025-07-01

## 2025-07-01 NOTE — ASSESSMENT & PLAN NOTE
Eliquis is ordered as 5 mg BID but he stated he only takes it once a day.   Continue Eliquis 5 mg once daily.

## 2025-07-01 NOTE — PROGRESS NOTES
Adult Annual Physical  Name: Zelalem Pepe      : 1950      MRN: [id number]  Encounter Provider: REMY Miranda  Encounter Date: 2025   Encounter department: [facility]    :  Assessment & Plan  Annual physical exam    Orders:  •  Lipid Panel with Direct LDL reflex; Future    Anxiety         Prediabetes    Orders:  •  Comprehensive metabolic panel; Future  •  Hemoglobin A1C With EAG; Future    Bilateral pulmonary embolism (HCC)         Acquired hypothyroidism    Orders:  •  TSH, 3rd generation with Free T4 reflex; Future    Sciatica of left side    Orders:  •  cyclobenzaprine (FLEXERIL) 10 mg tablet; Take 1 tablet (10 mg total) by mouth 3 (three) times a day as needed for muscle spasms    Chronic bilateral low back pain with bilateral sciatica    Orders:  •  cyclobenzaprine (FLEXERIL) 10 mg tablet; Take 1 tablet (10 mg total) by mouth 3 (three) times a day as needed for muscle spasms    Lumbosacral spondylosis without myelopathy    Orders:  •  cyclobenzaprine (FLEXERIL) 10 mg tablet; Take 1 tablet (10 mg total) by mouth 3 (three) times a day as needed for muscle spasms    Recurrent major depressive disorder, in full remission (HCC)           Internal derangement of left knee         Other long term (current) drug therapy    Orders:  •  [facility] All Prescribed Meds and Special Instructions  •  Amphetamines, Methamphetamines  •  Butalbital  •  Phenobarbital  •  Secobarbital  •  Alprazolam  •  Clonazepam  •  Diazepam  •  Lorazepam  •  Gabapentin  •  Pregabalin  •  Cocaine  •  Heroin  •  Buprenorphine  •  Levorphanol  •  Meperidine  •  Naltrexone  •  Fentanyl  •  Methadone  •  Oxycodone  •  Tapentadol  •  THC  •  Tramadol  •  Codeine, Hydrocodone, Hydropmorphone, Morphine  •  Bath Salts  •  Ethyl Glucuronide/Ethyl Sulfate  •  Kratom  •  Spice  •  Methylphenidate  •  Phentermine  •  Validity Oxidant  •  Validity Creatinine  •  Validity pH  •  Validity Specific  •  Xylazine  "Definitive Test    Screening for prostate cancer    Orders:  •  PSA, Total Screen; Future        Preventive Screenings:    - Prostate cancer screening: screening up-to-date     Immunizations:  - Immunizations due: Zoster (Shingrix)         History of Present Illness   {?Quick Links Encounters * My Last Note * Last Note in Specialty * Snapshot * Since Last Visit * History :12915}  Adult Annual Physical:  Patient presents for annual physical.     Diet and Physical Activity:  - Diet/Nutrition: no special diet, portion control and limited junk food.  - Exercise: no formal exercise.    Depression Screening:    - PHQ-9 Score: 4    General Health:  - Sleep: sleeps well and 7-8 hours of sleep on average.  - Hearing:. ears/hearing effected by wax  - Vision: most recent eye exam > 1 year ago and wears glasses.  - Dental: regular dental visits and brushes teeth once daily. use water-pik because of partial and bridges    /GYN Health:  - Follows with GYN: no.   - History of STDs: no     Health:  - History of STDs: no.   - Urinary symptoms: erectile dysfunction.     Advanced Care Planning:  - Has an advanced directive?: no    - Has a durable medical POA?: no      Review of Systems  {Select to Display PM (Optional):16780}    Objective {?Quick Links Trend Vitals * Enter New Vitals * Results Review * Timeline (Adult) * Labs * Imaging * Cardiology * Procedures * Lung Cancer Screening * Surgical eConsent :35748}  /84 (BP Location: Left arm, Patient Position: Sitting, Cuff Size: Standard)   Pulse 61   Ht 6' 1\" (1.854 m)   Wt 127 kg (281 lb)   SpO2 97%   BMI 37.07 kg/m²     Physical Exam  {Administrative / Billing Section (Optional):61049}  "

## 2025-07-01 NOTE — PROGRESS NOTES
Adult Annual Physical  Name: Zelalem Pepe      : 1950      MRN: [id number]  Encounter Provider: REMY Miranda  Encounter Date: 2025   Encounter department: [facility]    :  Assessment & Plan  Annual physical exam  Orders placed to recheck lipid panel and HgbA1C.   Pt has a longstanding history of back pain and is a pt of the spine center.   Currently he has acute back pain after an injury.   Start Flexeril 10 mg TID for 7 days.  Follow up with spine center.  Orders:  •  Lipid Panel with Direct LDL reflex; Future    Anxiety  Xanax 2 mg daily.   Celexa 40 mg daily.  These medications are working well for him.            Prediabetes  Metformin 1000 mg daily.  Continue Metformin.  Recheck HgbA1C. Last result from 25 was 6.0.   Continue healthy lifestyle changes.   Orders:  •  Comprehensive metabolic panel; Future  •  Hemoglobin A1C With EAG; Future    Bilateral pulmonary embolism (HCC)  Eliquis is ordered as 5 mg BID but he stated he only takes it once a day.   Continue Eliquis 5 mg once daily.       Acquired hypothyroidism  Continue Levothyroxine 125 mcg daily.  Orders:  •  TSH, 3rd generation with Free T4 reflex; Future    Sciatica of left side  Follow up with spine center.   Orders:  •  cyclobenzaprine (FLEXERIL) 10 mg tablet; Take 1 tablet (10 mg total) by mouth 3 (three) times a day as needed for muscle spasms    Chronic bilateral low back pain with bilateral sciatica  Follow up with spine center.   Orders:  •  cyclobenzaprine (FLEXERIL) 10 mg tablet; Take 1 tablet (10 mg total) by mouth 3 (three) times a day as needed for muscle spasms    Lumbosacral spondylosis without myelopathy  Follow up with spine center.   Orders:  •  cyclobenzaprine (FLEXERIL) 10 mg tablet; Take 1 tablet (10 mg total) by mouth 3 (three) times a day as needed for muscle spasms    Recurrent major depressive disorder, in full remission (HCC)  Xanax 2 mg daily.   Celexa 40 mg daily.  These  medications are working well for him.          Internal derangement of left knee         Other long term (current) drug therapy    Orders:  •  [facility] All Prescribed Meds and Special Instructions  •  Amphetamines, Methamphetamines  •  Butalbital  •  Phenobarbital  •  Secobarbital  •  Alprazolam  •  Clonazepam  •  Diazepam  •  Lorazepam  •  Gabapentin  •  Pregabalin  •  Cocaine  •  Heroin  •  Buprenorphine  •  Levorphanol  •  Meperidine  •  Naltrexone  •  Fentanyl  •  Methadone  •  Oxycodone  •  Tapentadol  •  THC  •  Tramadol  •  Codeine, Hydrocodone, Hydropmorphone, Morphine  •  Bath Salts  •  Ethyl Glucuronide/Ethyl Sulfate  •  Kratom  •  Spice  •  Methylphenidate  •  Phentermine  •  Validity Oxidant  •  Validity Creatinine  •  Validity pH  •  Validity Specific  •  Xylazine Definitive Test    Screening for prostate cancer    Orders:  •  PSA, Total Screen; Future        Preventive Screenings:  - Diabetes Screening: screening up-to-date and orders placed  - Cholesterol Screening: orders placed   - Hepatitis C screening: patient declines   - HIV screening: patient declines   - Colon cancer screening: screening up-to-date   - Lung cancer screening: screening not indicated   - Prostate cancer screening: screening up-to-date   - AAA screening: patient declines     Immunizations:  - Immunizations due: Zoster (Shingrix)      Depression Screening and Follow-up Plan: Patient was screened for depression during today's encounter. They screened negative with a PHQ-9 score of 4.          History of Present Illness     Adult Annual Physical:  Patient presents for annual physical. Pt is a 75-year-old male here for his annual physical and complaints of lower back pain. Pt was bending over a week ago to  a gas container and hurt his lower back. Pt now has constant back pain now. No numbness or tingling. Pt feels like he has loss of strength in bilateral lower extremities since injuring his back. No signs of saddle  anesthesia. Pt stated he has full ROM. He is established with the spine center. Spine center ordered him him a steroid pack which he has completed. Pt gets injections in his back. Last injection was on 6/14/25. Pt has seen PT in the past. No antalgic gait. No pain when sitting or laying. Most of the pain is with position changes. .     Diet and Physical Activity:  - Diet/Nutrition: no special diet, portion control and limited junk food.  - Exercise: no formal exercise.    Depression Screening:    - PHQ-9 Score: 4    General Health:  - Sleep: sleeps well and 7-8 hours of sleep on average.  - Hearing:. ears/hearing effected by wax  - Vision: most recent eye exam > 1 year ago and wears glasses.  - Dental: regular dental visits and brushes teeth once daily. use water-pik because of partial and bridges    /GYN Health:  - Follows with GYN: no.   - History of STDs: no     Health:  - History of STDs: no.   - Urinary symptoms: erectile dysfunction.     Advanced Care Planning:  - Has an advanced directive?: no    - Has a durable medical POA?: no      Review of Systems   Constitutional:  Negative for chills and fever.   HENT:  Negative for ear pain and sore throat.    Eyes:  Negative for pain and visual disturbance.   Respiratory:  Negative for cough, shortness of breath, wheezing and stridor.    Cardiovascular:  Negative for chest pain and palpitations.   Gastrointestinal:  Negative for abdominal pain, nausea and vomiting.   Genitourinary:  Negative for dysuria and hematuria.   Musculoskeletal:  Positive for back pain. Negative for arthralgias, gait problem, joint swelling, neck pain and neck stiffness.   Skin:  Negative for color change and rash.   Neurological:  Positive for weakness. Negative for dizziness, seizures, syncope, speech difficulty, light-headedness, numbness and headaches.   Psychiatric/Behavioral:  Negative for confusion. The patient is not nervous/anxious.    All other systems reviewed and are  "negative.        Objective   /84 (BP Location: Left arm, Patient Position: Sitting, Cuff Size: Standard)   Pulse 61   Ht 6' 1\" (1.854 m)   Wt 127 kg (281 lb)   SpO2 97%   BMI 37.07 kg/m²     Physical Exam  Vitals and nursing note reviewed.   Constitutional:       General: He is not in acute distress.     Appearance: He is well-developed.   HENT:      Head: Normocephalic and atraumatic.     Eyes:      Conjunctiva/sclera: Conjunctivae normal.       Cardiovascular:      Rate and Rhythm: Normal rate and regular rhythm.      Heart sounds: No murmur heard.  Pulmonary:      Effort: Pulmonary effort is normal. No respiratory distress.      Breath sounds: Normal breath sounds.   Abdominal:      Palpations: Abdomen is soft.      Tenderness: There is no abdominal tenderness.     Musculoskeletal:         General: No swelling, tenderness or deformity.      Cervical back: Neck supple.     Skin:     General: Skin is warm and dry.      Capillary Refill: Capillary refill takes less than 2 seconds.     Neurological:      General: No focal deficit present.      Mental Status: He is alert and oriented to person, place, and time. Mental status is at baseline.     Psychiatric:         Mood and Affect: Mood normal.         Behavior: Behavior normal.         Thought Content: Thought content normal.         Judgment: Judgment normal.         "

## 2025-07-01 NOTE — ASSESSMENT & PLAN NOTE
Continue Levothyroxine 125 mcg daily.  Orders:  •  TSH, 3rd generation with Free T4 reflex; Future

## 2025-07-01 NOTE — ASSESSMENT & PLAN NOTE
Metformin 1000 mg daily.  Continue Metformin.  Recheck HgbA1C. Last result from 1/30/25 was 6.0.   Continue healthy lifestyle changes.   Orders:  •  Comprehensive metabolic panel; Future  •  Hemoglobin A1C With EAG; Future

## 2025-07-01 NOTE — ASSESSMENT & PLAN NOTE
Follow up with spine center.   Orders:  •  cyclobenzaprine (FLEXERIL) 10 mg tablet; Take 1 tablet (10 mg total) by mouth 3 (three) times a day as needed for muscle spasms

## 2025-07-01 NOTE — PATIENT INSTRUCTIONS
"Patient Education     Routine physical for adults   The Basics   Written by the doctors and editors at [facility]   What is a physical? -- A physical is a routine visit, or \"check-up,\" with your doctor. You might also hear it called a \"wellness visit\" or \"preventive visit.\"  During each visit, the doctor will:   Ask about your physical and mental health   Ask about your habits, behaviors, and lifestyle   Do an exam   Give you vaccines if needed   Talk to you about any medicines you take   Give advice about your health   Answer your questions  Getting regular check-ups is an important part of taking care of your health. It can help your doctor find and treat any problems you have. But it's also important for preventing health problems.  A routine physical is different from a \"sick visit.\" A sick visit is when you see a doctor because of a health concern or problem. Since physicals are scheduled ahead of time, you can think about what you want to ask the doctor.  How often should I get a physical? -- It depends on your age and health. In general, for people age 21 years and older:   If you are younger than 50 years, you might be able to get a physical every 3 years.   If you are 50 years or older, your doctor might recommend a physical every year.  If you have an ongoing health condition, like diabetes or high blood pressure, your doctor will probably want to see you more often.  What happens during a physical? -- In general, each visit will include:   Physical exam - The doctor or nurse will check your height, weight, heart rate, and blood pressure. They will also look at your eyes and ears. They will ask about how you are feeling and whether you have any symptoms that bother you.   Medicines - It's a good idea to bring a list of all the medicines you take to each doctor visit. Your doctor will talk to you about your medicines and answer any questions. Tell them if you are having any side effects that bother you. You " "should also tell them if you are having trouble paying for any of your medicines.   Habits and behaviors - This includes:   Your diet   Your exercise habits   Whether you smoke, drink alcohol, or use drugs   Whether you are sexually active   Whether you feel safe at home  Your doctor will talk to you about things you can do to improve your health and lower your risk of health problems. They will also offer help and support. For example, if you want to quit smoking, they can give you advice and might prescribe medicines. If you want to improve your diet or get more physical activity, they can help you with this, too.   Lab tests, if needed - The tests you get will depend on your age and situation. For example, your doctor might want to check your:   Cholesterol   Blood sugar   Iron level   Vaccines - The recommended vaccines will depend on your age, health, and what vaccines you already had. Vaccines are very important because they can prevent certain serious or deadly infections.   Discussion of screening - \"Screening\" means checking for diseases or other health problems before they cause symptoms. Your doctor can recommend screening based on your age, risk, and preferences. This might include tests to check for:   Cancer, such as breast, prostate, cervical, ovarian, colorectal, prostate, lung, or skin cancer   Sexually transmitted infections, such as chlamydia and gonorrhea   Mental health conditions like depression and anxiety  Your doctor will talk to you about the different types of screening tests. They can help you decide which screenings to have. They can also explain what the results might mean.   Answering questions - The physical is a good time to ask the doctor or nurse questions about your health. If needed, they can refer you to other doctors or specialists, too.  Adults older than 65 years often need other care, too. As you get older, your doctor will talk to you about:   How to prevent falling at " home   Hearing or vision tests   Memory testing   How to take your medicines safely   Making sure that you have the help and support you need at home  All topics are updated as new evidence becomes available and our peer review process is complete.  This topic retrieved from [employer] on: May 02, 2024.  Topic 194969 Version 1.0  Release: 32.4.3 - C32.122  © 2024 UpToDate, Inc[employer] and/or its affiliates. All rights reserved.  Consumer Information Use and Disclaimer   Disclaimer: This generalized information is a limited summary of diagnosis, treatment, and/or medication information. It is not meant to be comprehensive and should be used as a tool to help the user understand and/or assess potential diagnostic and treatment options. It does NOT include all information about conditions, treatments, medications, side effects, or risks that may apply to a specific patient. It is not intended to be medical advice or a substitute for the medical advice, diagnosis, or treatment of a health care provider based on the health care provider's examination and assessment of a patient's specific and unique circumstances. Patients must speak with a health care provider for complete information about their health, medical questions, and treatment options, including any risks or benefits regarding use of medications. This information does not endorse any treatments or medications as safe, effective, or approved for treating a specific patient. Up[employer], Inc[employer] and its affiliates disclaim any warranty or liability relating to this information or the use thereof.The use of this information is governed by the Terms of Use, available at https://www.[employer].com/en/know/clinical-effectiveness-terms. 2024© Up[employer]Date, Inc. and its affiliates and/or licensors. All rights reserved.  Copyright   © 2024 [employer], [employer] and/or its affiliates. All rights reserved.

## 2025-07-05 LAB
4OH-XYLAZINE UR QL CFM: NEGATIVE NG/ML
6MAM UR QL CFM: NEGATIVE NG/ML
7AMINOCLONAZEPAM UR QL CFM: NEGATIVE NG/ML
A-OH ALPRAZ UR QL CFM: NORMAL NG/ML
ACCEPTABLE CREAT UR QL: NORMAL MG/DL
ACCEPTIBLE SP GR UR QL: NORMAL
AMPHET UR QL CFM: NEGATIVE NG/ML
BUPRENORPHINE UR QL CFM: NEGATIVE NG/ML
BUTALBITAL UR QL CFM: NEGATIVE NG/ML
BZE UR QL CFM: NEGATIVE NG/ML
CODEINE UR QL CFM: NEGATIVE NG/ML
EDDP UR QL CFM: NEGATIVE NG/ML
ETHYL GLUCURONIDE UR QL CFM: NEGATIVE NG/ML
ETHYL SULFATE UR QL SCN: NEGATIVE NG/ML
EUTYLONE UR QL: NEGATIVE NG/ML
FENTANYL UR QL CFM: NEGATIVE NG/ML
GLIADIN IGG SER IA-ACNC: NEGATIVE NG/ML
HYDROCODONE UR QL CFM: NEGATIVE NG/ML
HYDROMORPHONE UR QL CFM: NEGATIVE NG/ML
LORAZEPAM UR QL CFM: NEGATIVE NG/ML
ME-PHENIDATE UR QL CFM: NEGATIVE NG/ML
MEPERIDINE UR QL CFM: NEGATIVE NG/ML
METHADONE UR QL CFM: NEGATIVE NG/ML
METHAMPHET UR QL CFM: NEGATIVE NG/ML
MORPHINE UR QL CFM: NEGATIVE NG/ML
NALTREXONE UR QL CFM: NEGATIVE NG/ML
NITRITE UR QL: NORMAL UG/ML
NORBUPRENORPHINE UR QL CFM: NEGATIVE NG/ML
NORDIAZEPAM UR QL CFM: NEGATIVE NG/ML
NORFENTANYL UR QL CFM: NEGATIVE NG/ML
NORHYDROCODONE UR QL CFM: NEGATIVE NG/ML
NORMEPERIDINE UR QL CFM: NEGATIVE NG/ML
NOROXYCODONE UR QL CFM: NEGATIVE NG/ML
OXAZEPAM UR QL CFM: NEGATIVE NG/ML
OXYCODONE UR QL CFM: NEGATIVE NG/ML
OXYMORPHONE UR QL CFM: NEGATIVE NG/ML
PARA-FLUOROFENTANYL QUANTIFICATION: NORMAL NG/ML
PHENOBARB UR QL CFM: NEGATIVE NG/ML
RESULT ALL_PRESCRIBED MEDS AND SPECIAL INSTRUCTIONS: NORMAL
SECOBARBITAL UR QL CFM: NEGATIVE NG/ML
SL AMB 5F-ADB-M7 METABOLITE QUANTIFICATION: NEGATIVE NG/ML
SL AMB 7-OH-MITRAGYNINE (KRATOM ALKALOID) QUANTIFICATION: NEGATIVE NG/ML
SL AMB AB-FUBINACA-M3 METABOLITE QUANTIFICATION: NEGATIVE NG/ML
SL AMB ACETYL FENTANYL QUANTIFICATION: NORMAL NG/ML
SL AMB ACETYL NORFENTANYL QUANTIFICATION: NORMAL NG/ML
SL AMB ACRYL FENTANYL QUANTIFICATION: NORMAL NG/ML
SL AMB CARFENTANIL QUANTIFICATION: NORMAL NG/ML
SL AMB CTHC (MARIJUANA METABOLITE) QUANTIFICATION: ABNORMAL NG/ML
SL AMB DEXTRORPHAN (DEXTROMETHORPHAN METABOLITE) QUANT: NEGATIVE NG/ML
SL AMB GABAPENTIN QUANTIFICATION: NORMAL NG/ML
SL AMB JWH018 METABOLITE QUANTIFICATION: NEGATIVE NG/ML
SL AMB JWH073 METABOLITE QUANTIFICATION: NEGATIVE NG/ML
SL AMB MDMB-FUBINACA-M1 METABOLITE QUANTIFICATION: NEGATIVE NG/ML
SL AMB METHYLONE QUANTIFICATION: NEGATIVE NG/ML
SL AMB N-DESMETHYL-TRAMADOL QUANTIFICATION: NEGATIVE NG/ML
SL AMB PHENTERMINE QUANTIFICATION: NEGATIVE NG/ML
SL AMB PREGABALIN QUANTIFICATION: NEGATIVE NG/ML
SL AMB RCS4 METABOLITE QUANTIFICATION: NEGATIVE NG/ML
SL AMB RITALINIC ACID QUANTIFICATION: NEGATIVE NG/ML
SMOOTH MUSCLE AB TITR SER IF: NEGATIVE NG/ML
SPECIMEN DRAWN SERPL: NEGATIVE NG/ML
SPECIMEN PH ACCEPTABLE UR: NORMAL
TAPENTADOL UR QL CFM: NEGATIVE NG/ML
TEMAZEPAM UR QL CFM: NEGATIVE NG/ML
TRAMADOL UR QL CFM: NEGATIVE NG/ML
URATE/CREAT 24H UR: NEGATIVE NG/ML
XYLAZINE UR QL CFM: NEGATIVE NG/ML

## 2025-07-10 NOTE — ASSESSMENT & PLAN NOTE
Orders:  •  cyclobenzaprine (FLEXERIL) 10 mg tablet; Take 1 tablet (10 mg total) by mouth 3 (three) times a day as needed for muscle spasms